# Patient Record
Sex: FEMALE | Race: WHITE | NOT HISPANIC OR LATINO | Employment: FULL TIME | URBAN - METROPOLITAN AREA
[De-identification: names, ages, dates, MRNs, and addresses within clinical notes are randomized per-mention and may not be internally consistent; named-entity substitution may affect disease eponyms.]

---

## 2023-09-03 PROBLEM — D22.39 MELANOCYTIC NEVI OF OTHER PARTS OF FACE: Status: ACTIVE | Noted: 2023-03-08

## 2023-09-03 PROBLEM — M25.511 CHRONIC RIGHT SHOULDER PAIN: Status: ACTIVE | Noted: 2023-09-03

## 2023-09-03 PROBLEM — S63.502A SPRAIN OF LEFT WRIST: Status: ACTIVE | Noted: 2023-09-03

## 2023-09-03 PROBLEM — D22.4 MELANOCYTIC NEVI OF SCALP AND NECK: Status: ACTIVE | Noted: 2023-03-08

## 2023-09-03 PROBLEM — R92.30 DENSE BREAST TISSUE ON MAMMOGRAM: Status: ACTIVE | Noted: 2023-09-03

## 2023-09-03 PROBLEM — M19.012 BILATERAL SHOULDER REGION ARTHRITIS: Status: ACTIVE | Noted: 2023-09-03

## 2023-09-03 PROBLEM — M18.12 LOCALIZED PRIMARY OSTEOARTHRITIS OF CARPOMETACARPAL JOINT OF LEFT THUMB: Status: ACTIVE | Noted: 2023-09-03

## 2023-09-03 PROBLEM — D22.5 MELANOCYTIC NEVI OF TRUNK: Status: ACTIVE | Noted: 2023-03-08

## 2023-09-03 PROBLEM — L81.4 OTHER MELANIN HYPERPIGMENTATION: Status: ACTIVE | Noted: 2023-03-08

## 2023-09-03 PROBLEM — M25.561 BILATERAL KNEE PAIN: Status: ACTIVE | Noted: 2023-09-03

## 2023-09-03 PROBLEM — L23.9 ALLERGIC CONTACT DERMATITIS, UNSPECIFIED CAUSE: Status: ACTIVE | Noted: 2023-03-08

## 2023-09-03 PROBLEM — L57.0 ACTINIC KERATOSIS: Status: ACTIVE | Noted: 2023-03-08

## 2023-09-03 PROBLEM — D22.70 MELANOCYTIC NEVI OF UNSPECIFIED LOWER LIMB, INCLUDING HIP: Status: ACTIVE | Noted: 2023-03-08

## 2023-09-03 PROBLEM — S60.512A ABRASION OF LEFT HAND: Status: ACTIVE | Noted: 2023-09-03

## 2023-09-03 PROBLEM — M75.100 ROTATOR CUFF TEAR: Status: ACTIVE | Noted: 2023-09-03

## 2023-09-03 PROBLEM — M17.10 PATELLOFEMORAL ARTHRITIS: Status: ACTIVE | Noted: 2023-09-03

## 2023-09-03 PROBLEM — M75.92 LEFT SUPRASPINATUS TENDINITIS: Status: ACTIVE | Noted: 2023-09-03

## 2023-09-03 PROBLEM — S50.02XA CONTUSION OF LEFT ELBOW: Status: ACTIVE | Noted: 2023-09-03

## 2023-09-03 PROBLEM — N83.202 CYST OF OVARY, LEFT: Status: ACTIVE | Noted: 2023-09-03

## 2023-09-03 PROBLEM — R92.8 ABNORMAL FINDING ON BREAST IMAGING: Status: ACTIVE | Noted: 2023-09-03

## 2023-09-03 PROBLEM — L30.9 DERMATITIS, UNSPECIFIED: Status: ACTIVE | Noted: 2023-03-08

## 2023-09-03 PROBLEM — N60.91 UNSPECIFIED BENIGN MAMMARY DYSPLASIA OF RIGHT BREAST: Status: ACTIVE | Noted: 2023-09-03

## 2023-09-03 PROBLEM — G89.29 CHRONIC RIGHT SHOULDER PAIN: Status: ACTIVE | Noted: 2023-09-03

## 2023-09-03 PROBLEM — L90.5 SCAR CONDITION AND FIBROSIS OF SKIN: Status: ACTIVE | Noted: 2023-03-08

## 2023-09-03 PROBLEM — D18.01 HEMANGIOMA OF SKIN AND SUBCUTANEOUS TISSUE: Status: ACTIVE | Noted: 2023-03-08

## 2023-09-03 PROBLEM — M25.562 BILATERAL KNEE PAIN: Status: ACTIVE | Noted: 2023-09-03

## 2023-09-03 PROBLEM — E78.01 FAMILIAL HYPERCHOLESTEROLEMIA: Status: ACTIVE | Noted: 2023-09-03

## 2023-09-03 PROBLEM — M75.22 BICIPITAL TENDINITIS, LEFT: Status: ACTIVE | Noted: 2023-09-03

## 2023-09-03 PROBLEM — M17.0 ARTHRITIS OF BOTH KNEES: Status: ACTIVE | Noted: 2023-09-03
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
## 2023-09-03 PROBLEM — M19.011 BILATERAL SHOULDER REGION ARTHRITIS: Status: ACTIVE | Noted: 2023-09-03

## 2023-09-03 PROBLEM — E03.9 HYPOTHYROIDISM: Status: ACTIVE | Noted: 2023-09-03

## 2023-09-03 PROBLEM — D48.5 NEOPLASM OF UNCERTAIN BEHAVIOR OF SKIN: Status: ACTIVE | Noted: 2023-03-08

## 2023-09-03 PROBLEM — S46.211A STRAIN OF RIGHT BICEPS: Status: ACTIVE | Noted: 2023-09-03

## 2023-09-03 PROBLEM — C44.629 SQUAMOUS CELL CARCINOMA OF SKIN OF LEFT UPPER LIMB, INCLUDING SHOULDER: Status: ACTIVE | Noted: 2023-03-08

## 2023-09-03 PROBLEM — N94.9 ADNEXAL FULLNESS: Status: ACTIVE | Noted: 2023-09-03

## 2023-09-03 PROBLEM — L30.1 DYSHIDROSIS (POMPHOLYX): Status: ACTIVE | Noted: 2023-03-08

## 2023-09-03 PROBLEM — N60.91 ATYPICAL LOBULAR HYPERPLASIA (ALH) OF RIGHT BREAST: Status: ACTIVE | Noted: 2023-09-03

## 2023-09-03 PROBLEM — K52.9 CHRONIC DIARRHEA: Status: ACTIVE | Noted: 2023-09-03

## 2023-09-03 PROBLEM — R29.898 WEAKNESS OF SHOULDER: Status: ACTIVE | Noted: 2023-09-03

## 2023-09-03 PROBLEM — E78.5 HYPERLIPIDEMIA: Status: ACTIVE | Noted: 2023-09-03

## 2023-09-03 PROBLEM — D22.60 MELANOCYTIC NEVI OF UNSPECIFIED UPPER LIMB, INCLUDING SHOULDER: Status: ACTIVE | Noted: 2023-03-08

## 2023-09-03 PROBLEM — Z85.828 PERSONAL HISTORY OF OTHER MALIGNANT NEOPLASM OF SKIN: Status: ACTIVE | Noted: 2023-03-08

## 2023-09-03 PROBLEM — M25.512 SHOULDER PAIN, LEFT: Status: ACTIVE | Noted: 2023-09-03

## 2023-09-03 PROBLEM — M53.3 COCCYDYNIA: Status: ACTIVE | Noted: 2023-09-03

## 2023-09-03 RX ORDER — PSEUDOEPHEDRINE HCL 120 MG/1
120 TABLET, FILM COATED, EXTENDED RELEASE ORAL EVERY 12 HOURS
COMMUNITY
End: 2023-09-12 | Stop reason: ALTCHOICE

## 2023-09-03 RX ORDER — DOXYCYCLINE 100 MG/1
100 CAPSULE ORAL
COMMUNITY
Start: 2021-02-17 | End: 2023-09-12 | Stop reason: ALTCHOICE

## 2023-09-03 RX ORDER — FLUOCINONIDE 0.5 MG/G
1 CREAM TOPICAL
COMMUNITY
Start: 2016-05-17 | End: 2024-04-11 | Stop reason: WASHOUT

## 2023-09-03 RX ORDER — MULTIVITAMIN
TABLET ORAL
COMMUNITY
Start: 2022-11-17

## 2023-09-03 RX ORDER — DICLOFENAC SODIUM 10 MG/G
1 GEL TOPICAL DAILY
COMMUNITY
Start: 2023-08-01 | End: 2023-10-25 | Stop reason: SDUPTHER

## 2023-09-03 RX ORDER — MUPIROCIN 20 MG/G
OINTMENT TOPICAL
COMMUNITY
Start: 2021-02-17 | End: 2023-09-12 | Stop reason: ALTCHOICE

## 2023-09-03 RX ORDER — CYCLOBENZAPRINE HCL 5 MG
1 TABLET ORAL NIGHTLY PRN
COMMUNITY
Start: 2022-09-09 | End: 2023-09-12

## 2023-09-03 RX ORDER — CLINDAMYCIN PHOSPHATE 10 UG/ML
1 LOTION TOPICAL
COMMUNITY
Start: 2019-03-06

## 2023-09-03 RX ORDER — ACETAMINOPHEN 500 MG
2 TABLET ORAL EVERY 6 HOURS PRN
COMMUNITY

## 2023-09-03 RX ORDER — MULTIVIT-MIN/IRON/FOLIC ACID/K 18-600-40
500 CAPSULE ORAL
COMMUNITY
Start: 2022-11-17

## 2023-09-03 RX ORDER — IBUPROFEN 200 MG
200 TABLET ORAL
COMMUNITY

## 2023-09-03 RX ORDER — LIDOCAINE 50 MG/G
PATCH TOPICAL
COMMUNITY
Start: 2022-02-03 | End: 2023-10-09

## 2023-09-03 RX ORDER — GUAIFENESIN 600 MG/1
600 TABLET, EXTENDED RELEASE ORAL EVERY 12 HOURS PRN
COMMUNITY
End: 2023-09-12 | Stop reason: ALTCHOICE

## 2023-09-03 RX ORDER — TRIAMCINOLONE ACETONIDE 1 MG/G
1 CREAM TOPICAL
COMMUNITY
Start: 2020-09-29 | End: 2024-04-11 | Stop reason: WASHOUT

## 2023-09-03 RX ORDER — CELECOXIB 200 MG/1
1 CAPSULE ORAL
COMMUNITY
Start: 2022-06-14 | End: 2023-09-12

## 2023-09-03 RX ORDER — LETROZOLE 2.5 MG/1
2.5 TABLET, FILM COATED ORAL DAILY
COMMUNITY
Start: 2023-04-13 | End: 2023-10-10 | Stop reason: ENTERED-IN-ERROR

## 2023-09-03 RX ORDER — ONDANSETRON 4 MG/1
1 TABLET, ORALLY DISINTEGRATING ORAL EVERY 6 HOURS PRN
COMMUNITY
Start: 2022-12-16

## 2023-09-12 ENCOUNTER — OFFICE VISIT (OUTPATIENT)
Dept: PRIMARY CARE | Facility: CLINIC | Age: 53
End: 2023-09-12
Payer: COMMERCIAL

## 2023-09-12 VITALS
BODY MASS INDEX: 21.79 KG/M2 | SYSTOLIC BLOOD PRESSURE: 126 MMHG | DIASTOLIC BLOOD PRESSURE: 80 MMHG | HEART RATE: 70 BPM | WEIGHT: 135 LBS

## 2023-09-12 DIAGNOSIS — C50.412 MALIGNANT NEOPLASM OF UPPER-OUTER QUADRANT OF LEFT BREAST IN FEMALE, ESTROGEN RECEPTOR POSITIVE (MULTI): ICD-10-CM

## 2023-09-12 DIAGNOSIS — M62.838 CERVICAL PARASPINOUS MUSCLE SPASM: ICD-10-CM

## 2023-09-12 DIAGNOSIS — S40.022A CONTUSION OF LEFT UPPER ARM, INITIAL ENCOUNTER: ICD-10-CM

## 2023-09-12 DIAGNOSIS — Z17.0 MALIGNANT NEOPLASM OF UPPER-OUTER QUADRANT OF LEFT BREAST IN FEMALE, ESTROGEN RECEPTOR POSITIVE (MULTI): ICD-10-CM

## 2023-09-12 DIAGNOSIS — F51.04 PSYCHOPHYSIOLOGICAL INSOMNIA: ICD-10-CM

## 2023-09-12 DIAGNOSIS — F43.10 PTSD (POST-TRAUMATIC STRESS DISORDER): Primary | ICD-10-CM

## 2023-09-12 DIAGNOSIS — S80.812A ABRASION OF ANTERIOR LEFT LOWER LEG, INITIAL ENCOUNTER: ICD-10-CM

## 2023-09-12 DIAGNOSIS — F51.5 NIGHTMARES: ICD-10-CM

## 2023-09-12 DIAGNOSIS — M54.81 BILATERAL OCCIPITAL NEURALGIA: ICD-10-CM

## 2023-09-12 PROBLEM — M77.8 RIGHT SHOULDER TENDONITIS: Status: ACTIVE | Noted: 2018-11-08

## 2023-09-12 PROBLEM — R92.30 DENSE BREAST TISSUE ON MAMMOGRAM: Status: RESOLVED | Noted: 2023-09-03 | Resolved: 2023-09-12

## 2023-09-12 PROBLEM — M75.111 NONTRAUMATIC PARTIAL BILATERAL ROTATOR CUFF TEAR: Status: ACTIVE | Noted: 2019-01-15

## 2023-09-12 PROBLEM — D48.5 NEOPLASM OF UNCERTAIN BEHAVIOR OF SKIN: Status: RESOLVED | Noted: 2023-03-08 | Resolved: 2023-09-12

## 2023-09-12 PROBLEM — R92.8 ABNORMAL FINDING ON BREAST IMAGING: Status: RESOLVED | Noted: 2023-09-03 | Resolved: 2023-09-12

## 2023-09-12 PROBLEM — K52.9 CHRONIC DIARRHEA: Status: RESOLVED | Noted: 2023-09-03 | Resolved: 2023-09-12

## 2023-09-12 PROBLEM — S43.431A LABRAL TEAR OF SHOULDER, RIGHT, INITIAL ENCOUNTER: Status: ACTIVE | Noted: 2019-01-15

## 2023-09-12 PROBLEM — M75.112 NONTRAUMATIC PARTIAL BILATERAL ROTATOR CUFF TEAR: Status: ACTIVE | Noted: 2019-01-15

## 2023-09-12 PROBLEM — N60.91 UNSPECIFIED BENIGN MAMMARY DYSPLASIA OF RIGHT BREAST: Status: RESOLVED | Noted: 2023-09-03 | Resolved: 2023-09-12

## 2023-09-12 PROBLEM — Z85.828 PERSONAL HISTORY OF OTHER MALIGNANT NEOPLASM OF SKIN: Status: RESOLVED | Noted: 2023-03-08 | Resolved: 2023-09-12

## 2023-09-12 PROBLEM — E78.01 FAMILIAL HYPERCHOLESTEROLEMIA: Status: RESOLVED | Noted: 2023-09-03 | Resolved: 2023-09-12

## 2023-09-12 PROBLEM — S50.02XA CONTUSION OF LEFT ELBOW: Status: RESOLVED | Noted: 2023-09-03 | Resolved: 2023-09-12

## 2023-09-12 PROCEDURE — 99214 OFFICE O/P EST MOD 30 MIN: CPT | Performed by: FAMILY MEDICINE

## 2023-09-12 PROCEDURE — 1036F TOBACCO NON-USER: CPT | Performed by: FAMILY MEDICINE

## 2023-09-12 RX ORDER — METHYLPREDNISOLONE 4 MG/1
TABLET ORAL
Qty: 21 TABLET | Refills: 0 | Status: SHIPPED | OUTPATIENT
Start: 2023-09-12 | End: 2023-10-31 | Stop reason: ALTCHOICE

## 2023-09-12 RX ORDER — LORAZEPAM 0.5 MG/1
0.5 TABLET ORAL EVERY 8 HOURS PRN
Qty: 21 TABLET | Refills: 0 | Status: SHIPPED | OUTPATIENT
Start: 2023-09-12 | End: 2024-04-11 | Stop reason: WASHOUT

## 2023-09-12 ASSESSMENT — PATIENT HEALTH QUESTIONNAIRE - PHQ9
2. FEELING DOWN, DEPRESSED OR HOPELESS: NOT AT ALL
SUM OF ALL RESPONSES TO PHQ9 QUESTIONS 1 AND 2: 0
1. LITTLE INTEREST OR PLEASURE IN DOING THINGS: NOT AT ALL

## 2023-09-12 NOTE — ASSESSMENT & PLAN NOTE
I suspect that the Medrol Dosepak should help with this area, but if it becomes resistant to therapy, cortisone injections can be provided in the area of concern

## 2023-09-12 NOTE — ASSESSMENT & PLAN NOTE
Since you have failed OTC remedies that were noted in the HPI, we discussed the use of Ativan on a short-term an interim basis to help with your sleep cycle, hopefully decrease your racing thoughts, and allow you to sleep for a few nights

## 2023-09-12 NOTE — ASSESSMENT & PLAN NOTE
I suggested a comprehensive team approach that includes physical therapy to further evaluate and treat  Rx Medrol Dosepak was provided to help with inflammation and subsequent pain  A muscle relaxant medication was provided via the ED, which may continue to be used if necessary  Appropriate office visits in follow-up as necessary  Return to the clinic either during or after physical therapy if the pain does not improve  We will have to consider further evaluation that may include MRI imaging    I would like to see you at the end of your physical therapy sessions if your pain has resolved  We can then ensure proper medical clearance and provide resolution to your case
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
## 2023-09-12 NOTE — ASSESSMENT & PLAN NOTE
Thoughts and prayers are with you as you continue to fight the odonnell of beating cancer  If there is anything that I can do, please do not hesitate to communicate with me  Please continue to follow with surgical oncology team as necessary

## 2023-09-22 DIAGNOSIS — M62.838 CERVICAL PARASPINOUS MUSCLE SPASM: Primary | ICD-10-CM

## 2023-09-22 RX ORDER — CYCLOBENZAPRINE HCL 10 MG
10 TABLET ORAL 2 TIMES DAILY PRN
Qty: 60 TABLET | Refills: 0 | Status: SHIPPED | OUTPATIENT
Start: 2023-09-22 | End: 2024-04-11 | Stop reason: WASHOUT

## 2023-09-26 ENCOUNTER — OFFICE VISIT (OUTPATIENT)
Dept: PRIMARY CARE | Facility: CLINIC | Age: 53
End: 2023-09-26
Payer: COMMERCIAL

## 2023-09-26 ENCOUNTER — LAB (OUTPATIENT)
Dept: LAB | Facility: LAB | Age: 53
End: 2023-09-26
Payer: COMMERCIAL

## 2023-09-26 VITALS
DIASTOLIC BLOOD PRESSURE: 72 MMHG | WEIGHT: 136 LBS | SYSTOLIC BLOOD PRESSURE: 120 MMHG | BODY MASS INDEX: 21.95 KG/M2 | HEART RATE: 70 BPM

## 2023-09-26 DIAGNOSIS — T45.1X5A HOT FLASHES RELATED TO AROMATASE INHIBITOR THERAPY: ICD-10-CM

## 2023-09-26 DIAGNOSIS — M62.838 MUSCLE SPASMS OF NECK: ICD-10-CM

## 2023-09-26 DIAGNOSIS — Z00.00 ANNUAL PHYSICAL EXAM: Primary | ICD-10-CM

## 2023-09-26 DIAGNOSIS — M54.81 BILATERAL OCCIPITAL NEURALGIA: ICD-10-CM

## 2023-09-26 DIAGNOSIS — Z00.00 ANNUAL PHYSICAL EXAM: ICD-10-CM

## 2023-09-26 DIAGNOSIS — M99.01 SOMATIC DYSFUNCTION OF SPINE, CERVICAL: ICD-10-CM

## 2023-09-26 DIAGNOSIS — R23.2 HOT FLASHES RELATED TO AROMATASE INHIBITOR THERAPY: ICD-10-CM

## 2023-09-26 PROBLEM — R51.9 HEADACHE: Status: ACTIVE | Noted: 2023-09-26

## 2023-09-26 LAB
ALANINE AMINOTRANSFERASE (SGPT) (U/L) IN SER/PLAS: 10 U/L (ref 7–45)
ALBUMIN (G/DL) IN SER/PLAS: 4.1 G/DL (ref 3.4–5)
ALKALINE PHOSPHATASE (U/L) IN SER/PLAS: 55 U/L (ref 33–110)
ANION GAP IN SER/PLAS: 11 MMOL/L (ref 10–20)
ASPARTATE AMINOTRANSFERASE (SGOT) (U/L) IN SER/PLAS: 19 U/L (ref 9–39)
BASOPHILS (10*3/UL) IN BLOOD BY AUTOMATED COUNT: 0.02 X10E9/L (ref 0–0.1)
BASOPHILS/100 LEUKOCYTES IN BLOOD BY AUTOMATED COUNT: 0.5 % (ref 0–2)
BILIRUBIN TOTAL (MG/DL) IN SER/PLAS: 0.5 MG/DL (ref 0–1.2)
CALCIUM (MG/DL) IN SER/PLAS: 9 MG/DL (ref 8.6–10.3)
CARBON DIOXIDE, TOTAL (MMOL/L) IN SER/PLAS: 26 MMOL/L (ref 21–32)
CHLORIDE (MMOL/L) IN SER/PLAS: 107 MMOL/L (ref 98–107)
CHOLESTEROL (MG/DL) IN SER/PLAS: 325 MG/DL (ref 0–199)
CHOLESTEROL IN HDL (MG/DL) IN SER/PLAS: 73.6 MG/DL
CHOLESTEROL/HDL RATIO: 4.4
CREATININE (MG/DL) IN SER/PLAS: 0.81 MG/DL (ref 0.5–1.05)
EOSINOPHILS (10*3/UL) IN BLOOD BY AUTOMATED COUNT: 0.03 X10E9/L (ref 0–0.7)
EOSINOPHILS/100 LEUKOCYTES IN BLOOD BY AUTOMATED COUNT: 0.8 % (ref 0–6)
ERYTHROCYTE DISTRIBUTION WIDTH (RATIO) BY AUTOMATED COUNT: 13.2 % (ref 11.5–14.5)
ERYTHROCYTE MEAN CORPUSCULAR HEMOGLOBIN CONCENTRATION (G/DL) BY AUTOMATED: 32.3 G/DL (ref 32–36)
ERYTHROCYTE MEAN CORPUSCULAR VOLUME (FL) BY AUTOMATED COUNT: 91 FL (ref 80–100)
ERYTHROCYTES (10*6/UL) IN BLOOD BY AUTOMATED COUNT: 4.18 X10E12/L (ref 4–5.2)
GFR FEMALE: 85 ML/MIN/1.73M2
GLUCOSE (MG/DL) IN SER/PLAS: 82 MG/DL (ref 74–99)
HEMATOCRIT (%) IN BLOOD BY AUTOMATED COUNT: 38.1 % (ref 36–46)
HEMOGLOBIN (G/DL) IN BLOOD: 12.3 G/DL (ref 12–16)
IMMATURE GRANULOCYTES/100 LEUKOCYTES IN BLOOD BY AUTOMATED COUNT: 0.5 % (ref 0–0.9)
LDL: 227 MG/DL (ref 0–99)
LEUKOCYTES (10*3/UL) IN BLOOD BY AUTOMATED COUNT: 3.9 X10E9/L (ref 4.4–11.3)
LYMPHOCYTES (10*3/UL) IN BLOOD BY AUTOMATED COUNT: 1.01 X10E9/L (ref 1.2–4.8)
LYMPHOCYTES/100 LEUKOCYTES IN BLOOD BY AUTOMATED COUNT: 25.8 % (ref 13–44)
MONOCYTES (10*3/UL) IN BLOOD BY AUTOMATED COUNT: 0.4 X10E9/L (ref 0.1–1)
MONOCYTES/100 LEUKOCYTES IN BLOOD BY AUTOMATED COUNT: 10.2 % (ref 2–10)
NEUTROPHILS (10*3/UL) IN BLOOD BY AUTOMATED COUNT: 2.44 X10E9/L (ref 1.2–7.7)
NEUTROPHILS/100 LEUKOCYTES IN BLOOD BY AUTOMATED COUNT: 62.2 % (ref 40–80)
PLATELETS (10*3/UL) IN BLOOD AUTOMATED COUNT: 204 X10E9/L (ref 150–450)
POC APPEARANCE, URINE: CLEAR
POC BILIRUBIN, URINE: NEGATIVE
POC BLOOD, URINE: NEGATIVE
POC COLOR, URINE: YELLOW
POC GLUCOSE, URINE: NEGATIVE MG/DL
POC KETONES, URINE: NEGATIVE MG/DL
POC LEUKOCYTES, URINE: NEGATIVE
POC NITRITE,URINE: NEGATIVE
POC PH, URINE: 6 PH
POC PROTEIN, URINE: NEGATIVE MG/DL
POC SPECIFIC GRAVITY, URINE: 1.01
POC UROBILINOGEN, URINE: 0.2 EU/DL
POTASSIUM (MMOL/L) IN SER/PLAS: 4.7 MMOL/L (ref 3.5–5.3)
PROTEIN TOTAL: 6.3 G/DL (ref 6.4–8.2)
SODIUM (MMOL/L) IN SER/PLAS: 139 MMOL/L (ref 136–145)
THYROTROPIN (MIU/L) IN SER/PLAS BY DETECTION LIMIT <= 0.05 MIU/L: 4.34 MIU/L (ref 0.44–3.98)
THYROXINE (T4) FREE (NG/DL) IN SER/PLAS: 0.76 NG/DL (ref 0.61–1.12)
TRIGLYCERIDE (MG/DL) IN SER/PLAS: 123 MG/DL (ref 0–149)
UREA NITROGEN (MG/DL) IN SER/PLAS: 18 MG/DL (ref 6–23)
VLDL: 25 MG/DL (ref 0–40)

## 2023-09-26 PROCEDURE — 84443 ASSAY THYROID STIM HORMONE: CPT

## 2023-09-26 PROCEDURE — 99213 OFFICE O/P EST LOW 20 MIN: CPT | Performed by: FAMILY MEDICINE

## 2023-09-26 PROCEDURE — 84439 ASSAY OF FREE THYROXINE: CPT

## 2023-09-26 PROCEDURE — 85025 COMPLETE CBC W/AUTO DIFF WBC: CPT

## 2023-09-26 PROCEDURE — 93000 ELECTROCARDIOGRAM COMPLETE: CPT | Performed by: FAMILY MEDICINE

## 2023-09-26 PROCEDURE — 98925 OSTEOPATH MANJ 1-2 REGIONS: CPT | Performed by: FAMILY MEDICINE

## 2023-09-26 PROCEDURE — 36415 COLL VENOUS BLD VENIPUNCTURE: CPT

## 2023-09-26 PROCEDURE — 99396 PREV VISIT EST AGE 40-64: CPT | Performed by: FAMILY MEDICINE

## 2023-09-26 PROCEDURE — 80053 COMPREHEN METABOLIC PANEL: CPT

## 2023-09-26 PROCEDURE — 80061 LIPID PANEL: CPT

## 2023-09-26 PROCEDURE — 81002 URINALYSIS NONAUTO W/O SCOPE: CPT | Performed by: FAMILY MEDICINE

## 2023-09-26 PROCEDURE — 1036F TOBACCO NON-USER: CPT | Performed by: FAMILY MEDICINE

## 2023-09-26 ASSESSMENT — PATIENT HEALTH QUESTIONNAIRE - PHQ9
1. LITTLE INTEREST OR PLEASURE IN DOING THINGS: NOT AT ALL
SUM OF ALL RESPONSES TO PHQ9 QUESTIONS 1 AND 2: 0
2. FEELING DOWN, DEPRESSED OR HOPELESS: NOT AT ALL

## 2023-09-26 NOTE — ASSESSMENT & PLAN NOTE
Follows with oncology and GYN, Next oncology appointment scheduled for October, Follow up mammogram scheduled for November 3rd, on oral letrozole

## 2023-09-26 NOTE — ASSESSMENT & PLAN NOTE
Osteopathic manipulation therapy was performed today using soft tissue and high velocity, low amplitude techniques to the affected region as noted on physical examination. The procedure was tolerated well, successful, and without complications.    Osteopathic manipulation therapy was performed today in order to improve blood flow, relax muscles, and free your joints that are not moving properly in an attempt to restore normal functioning.    This approach is not about getting rid of pain, but instead trying to reduce pain and discomfort while improving muscular endurance.     Osteopathic manipulation therapy helps us to identify where there are muscular joint and soft tissue imbalances and using osteopathic manipulative treatments can aid in helping to put those areas back into better movement or alignment. The consequence of these dysfunctional movements of the body results in some group of muscles being overworked leading to fatigue and discomfort and pain while other muscles are being underworked.     This will also impact joints and joint tissues that have nerve receptors which can also contribute to the pain and discomfort. Since the body wants to be moving at its most efficient manner, osteopathic manipulation, by restoring these dysfunctional movements of the musculoskeletal system into better alignment, can finally allow the body to actually utilize the underworked muscles more and decrease overworking of the overused muscles which results in less fatigue of the muscles and less stress at the joint level.

## 2023-09-26 NOTE — ASSESSMENT & PLAN NOTE
Bilateral shoulder pain, does wake from sleep due to this, scheduling bilateral shoulder injections soon; if not she will take Medrol dose pack prescribed

## 2023-09-26 NOTE — ASSESSMENT & PLAN NOTE
Unfortunately, this is a very common side effect of the medication  There may be some medication that we could be able to use for you if this becomes problematic such as an SSRI if you choose to go that route
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
## 2023-09-26 NOTE — ASSESSMENT & PLAN NOTE
Fasting bloodwork ordered for today, not currently on pharmacotherapy for this, normal physical exam

## 2023-09-26 NOTE — ASSESSMENT & PLAN NOTE
Currently stable, sleep is managed with Ativan at night, patient is no longer waking up with nightmares, continue to monitor

## 2023-09-26 NOTE — ASSESSMENT & PLAN NOTE
Osteopathic manipulation therapy was performed today using soft tissue and high velocity, low amplitude techniques to the affected region as noted on physical examination. The procedure was tolerated well, successful, and without complications.

## 2023-09-26 NOTE — ASSESSMENT & PLAN NOTE
Complete history and physical examination was performed  EKG reveals sinus rhythm without acute changes  We will notify of test results once available

## 2023-09-27 NOTE — RESULT ENCOUNTER NOTE
Your cholesterol levels are as follows: 325 total, 73 HDL, 227 LDL, and 123 triglycerides. Previously, they were 297 total, 76 HDL, 201 LDL, and 101 triglycerides. This represents one of the higher LDL levels I've encountered in my practice. However, it's worth noting that your CT cardiac score was 0 back in 2013. If you haven't already, it might be a good idea to have a conversation with a cardiologist. Generally, when LDL levels exceed 180, statin therapy is recommended, regardless of a CT cardiac score of 0.    Your sugar levels, kidney function, liver function, and electrolytes are all within the normal range.    Your TSH (Thyroid-Stimulating Hormone) is slightly elevated at 4.34, compared to 4.46 previously, but your free T4 level is normal. I don't believe this indicates true hypothyroidism at this point, but it could potentially progress to that stage, requiring lifelong medication. Since your T4 is normal, there's no need for treatment at this time.    Your white blood cell count (WBC) is a bit low at 3.9, compared to 3.1 previously, and it has been as low as 3.0 in the past. This appears to be consistent with your normal baseline.    If you have any further questions or concerns, please don't hesitate to reach out. [phone]

## 2023-09-28 DIAGNOSIS — E78.2 MIXED HYPERLIPIDEMIA: Primary | ICD-10-CM

## 2023-10-06 ENCOUNTER — CLINICAL SUPPORT (OUTPATIENT)
Dept: ORTHOPEDIC SURGERY | Facility: HOSPITAL | Age: 53
End: 2023-10-06
Payer: COMMERCIAL

## 2023-10-06 DIAGNOSIS — M77.8 RIGHT SHOULDER TENDONITIS: ICD-10-CM

## 2023-10-06 DIAGNOSIS — G89.29 CHRONIC LEFT SHOULDER PAIN: Primary | ICD-10-CM

## 2023-10-06 DIAGNOSIS — M25.512 LEFT SHOULDER PAIN, UNSPECIFIED CHRONICITY: Primary | ICD-10-CM

## 2023-10-06 DIAGNOSIS — M25.512 CHRONIC LEFT SHOULDER PAIN: Primary | ICD-10-CM

## 2023-10-06 PROCEDURE — 20610 DRAIN/INJ JOINT/BURSA W/O US: CPT | Mod: RT | Performed by: NURSE PRACTITIONER

## 2023-10-06 PROCEDURE — 99213 OFFICE O/P EST LOW 20 MIN: CPT | Mod: 25 | Performed by: NURSE PRACTITIONER

## 2023-10-06 PROCEDURE — 2500000004 HC RX 250 GENERAL PHARMACY W/ HCPCS (ALT 636 FOR OP/ED): Performed by: NURSE PRACTITIONER

## 2023-10-06 PROCEDURE — 2500000005 HC RX 250 GENERAL PHARMACY W/O HCPCS: Performed by: NURSE PRACTITIONER

## 2023-10-06 PROCEDURE — 99213 OFFICE O/P EST LOW 20 MIN: CPT | Performed by: NURSE PRACTITIONER

## 2023-10-06 RX ADMIN — TRIAMCINOLONE ACETONIDE 1 MG: 200 INJECTION, SUSPENSION INTRA-ARTICULAR; INTRAMUSCULAR at 23:31

## 2023-10-06 RX ADMIN — LIDOCAINE HYDROCHLORIDE 4 ML: 10 INJECTION, SOLUTION INFILTRATION; PERINEURAL at 23:31
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
## 2023-10-09 RX ORDER — LIDOCAINE 50 MG/G
PATCH TOPICAL
Qty: 30 PATCH | Refills: 0 | Status: SHIPPED | OUTPATIENT
Start: 2023-10-09 | End: 2023-11-08

## 2023-10-10 ENCOUNTER — TELEPHONE VISIT (OUTPATIENT)
Dept: HEMATOLOGY/ONCOLOGY | Facility: HOSPITAL | Age: 53
End: 2023-10-10
Payer: COMMERCIAL

## 2023-10-10 DIAGNOSIS — Z17.0 MALIGNANT NEOPLASM OF BREAST IN FEMALE, ESTROGEN RECEPTOR POSITIVE, UNSPECIFIED LATERALITY, UNSPECIFIED SITE OF BREAST (MULTI): Primary | ICD-10-CM

## 2023-10-10 DIAGNOSIS — Z17.0 MALIGNANT NEOPLASM OF UPPER-OUTER QUADRANT OF LEFT BREAST IN FEMALE, ESTROGEN RECEPTOR POSITIVE (MULTI): Primary | ICD-10-CM

## 2023-10-10 DIAGNOSIS — C50.919 MALIGNANT NEOPLASM OF BREAST IN FEMALE, ESTROGEN RECEPTOR POSITIVE, UNSPECIFIED LATERALITY, UNSPECIFIED SITE OF BREAST (MULTI): Primary | ICD-10-CM

## 2023-10-10 DIAGNOSIS — C50.412 MALIGNANT NEOPLASM OF UPPER-OUTER QUADRANT OF LEFT BREAST IN FEMALE, ESTROGEN RECEPTOR POSITIVE (MULTI): Primary | ICD-10-CM

## 2023-10-10 PROCEDURE — 99213 OFFICE O/P EST LOW 20 MIN: CPT | Performed by: STUDENT IN AN ORGANIZED HEALTH CARE EDUCATION/TRAINING PROGRAM

## 2023-10-10 RX ORDER — LETROZOLE 2.5 MG/1
2.5 TABLET, FILM COATED ORAL DAILY
Qty: 30 TABLET | Refills: 0 | OUTPATIENT
Start: 2023-10-10 | End: 2023-11-09

## 2023-10-10 NOTE — TELEPHONE ENCOUNTER
Patient requesting refill for Letrozole PO 2.5mg PO every day, according to the chart in the EMR RX sent already to Dr. Olivera as a request to refill.
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
## 2023-10-16 ENCOUNTER — LAB (OUTPATIENT)
Dept: LAB | Facility: LAB | Age: 53
End: 2023-10-16
Payer: COMMERCIAL

## 2023-10-16 DIAGNOSIS — Z17.0 MALIGNANT NEOPLASM OF UPPER-OUTER QUADRANT OF LEFT BREAST IN FEMALE, ESTROGEN RECEPTOR POSITIVE (MULTI): ICD-10-CM

## 2023-10-16 DIAGNOSIS — C50.412 MALIGNANT NEOPLASM OF UPPER-OUTER QUADRANT OF LEFT BREAST IN FEMALE, ESTROGEN RECEPTOR POSITIVE (MULTI): ICD-10-CM

## 2023-10-16 LAB
ESTRADIOL SERPL-MCNC: <19 PG/ML
FSH SERPL-ACNC: 52.8 IU/L
LH SERPL-ACNC: 29.5 IU/L

## 2023-10-16 PROCEDURE — 83002 ASSAY OF GONADOTROPIN (LH): CPT

## 2023-10-16 PROCEDURE — 83001 ASSAY OF GONADOTROPIN (FSH): CPT

## 2023-10-16 PROCEDURE — 82670 ASSAY OF TOTAL ESTRADIOL: CPT

## 2023-10-17 RX ORDER — TRIAMCINOLONE ACETONIDE 40 MG/ML
1 INJECTION, SUSPENSION INTRA-ARTICULAR; INTRAMUSCULAR
Status: COMPLETED | OUTPATIENT
Start: 2023-10-06 | End: 2023-10-06

## 2023-10-17 RX ORDER — LIDOCAINE HYDROCHLORIDE 10 MG/ML
4 INJECTION INFILTRATION; PERINEURAL
Status: COMPLETED | OUTPATIENT
Start: 2023-10-06 | End: 2023-10-06

## 2023-10-20 ENCOUNTER — HOSPITAL ENCOUNTER (OUTPATIENT)
Dept: RADIATION ONCOLOGY | Facility: CLINIC | Age: 53
Setting detail: RADIATION/ONCOLOGY SERIES
Discharge: STILL ADMITTED OR ADMINISTRATIVE | End: 2023-10-20
Payer: COMMERCIAL

## 2023-10-20 VITALS
WEIGHT: 137.5 LBS | HEART RATE: 58 BPM | TEMPERATURE: 97.3 F | DIASTOLIC BLOOD PRESSURE: 77 MMHG | OXYGEN SATURATION: 99 % | BODY MASS INDEX: 22.19 KG/M2 | SYSTOLIC BLOOD PRESSURE: 130 MMHG

## 2023-10-20 DIAGNOSIS — C50.412 MALIGNANT NEOPLASM OF UPPER-OUTER QUADRANT OF LEFT BREAST IN FEMALE, ESTROGEN RECEPTOR POSITIVE (MULTI): Primary | ICD-10-CM

## 2023-10-20 DIAGNOSIS — Z17.0 MALIGNANT NEOPLASM OF UPPER-OUTER QUADRANT OF LEFT BREAST IN FEMALE, ESTROGEN RECEPTOR POSITIVE (MULTI): Primary | ICD-10-CM

## 2023-10-20 PROCEDURE — 99214 OFFICE O/P EST MOD 30 MIN: CPT | Performed by: NURSE PRACTITIONER

## 2023-10-20 ASSESSMENT — PAIN SCALES - GENERAL: PAINLEVEL: 0-NO PAIN

## 2023-10-20 NOTE — PROGRESS NOTES
Radiation Oncology Follow-Up    Patient Name:  Ruba Woodard  MRN:  [id number]  :  1970    Referring Provider: No ref. provider found  Primary Care Provider: Gregg Gonzalez DO  Care Team: Patient Care Team:  Gregg Gonzalez DO as PCP - General (Family Medicine)  Gomez Olivera MD as Consulting Physician (Hematology and Oncology)    Date of Service: 10/20/2023          AJCC Edition: 8th (AJCC), Diagnosis Date: Dec 2022, IA, pT1b pN0 cM0 G1     Treatment Synopsis:    53 year female with left breast invasive carcinoma  IDC grade 2, ER 95%, [place] 70% her2 2+ but negative by dual RE  left partial mastectomy with SLNB  (0/2) c/w grade I IDC, with DCIS grade2 , low risk mammaprint low risk luminal A +0.505, stage as [id number].     Medical oncology: Dr. Gomez Olivera  Radiation oncology: Dr. Gaye Egale  Surgical oncology:  Dr. Nely Chavez     Treatment Summary:      On 10/13/2022: had a digital mammogram, screening that showed heterogenously dense breast tissue which may obscure small masses, left breat asymetry with right breast calcifications.  BIRADS category 0     On 10/28/2022: she had diagnostic mammogram that showed heterogenously dense breast, previously noted calcifications are grouped round, punctuate and indistinct calcifications in the inferolateral right breast  at anterior depth. within the left breast,  the previously noted asymmetry persists as an irregular focal asymmetry in the superolateral aspect of the posterior depth . On ultrasound at 1o'clock, 8 CFN  0.7x0.8x0.5cm mass irregular , left axilla US showed 3 unremarkable LN      On 2022: right breast calcification core needle biopsy  showed columnar cell change and hyperplasia with associated focal microcalcifications, left breast core needle biopsy at 1oclock, 10 CFN showed IDC grade 2, ER 95%, [place] 70% her2 2+ but negative  by dual RE .with associated microcalcifcation with DICS, low nuclear grade, cribriform pattern with necrosis  and  calcifications. biopsy again of her right breast calcifications that was negative     On 12/14/2022 underwent left partial mastectomy with SLNB  (0/2) c/w grade I IDC, with DCIS grade2 , low risk mammaprint low risk luminal A +0.505, stage as [id number]     2/8/23 - 2/28/23: Radiation to the left breast consisting of a dose of 40.05 Gy given in 15 fractions of 2.67 Gy per fraction. ABC was utilized for cardiac sparing.      Adjuvant endocrine therapy with aromatase inhibitor       SUBJECTIVE  History of Present Illness:   Ruba Woodard is here today for routine radiation follow up/surveillance visit.  She is doing very well and reports left breast well healed post treatment. She had minimal skin changes and skin is intact. No swelling of left arm or difficulty with ROM.  She continues letrozole and says the only adverse effect is elevation in her lipids. Denies headaches, fever, chills, cough, SOB, chest  pain, GI or  complaints, or bony pain. She does have arthritis in her knees and shoulders.      Review of Systems:    Review of Systems   All other systems reviewed and are negative.      Performance Status:   The Karnofsky performance scale today is 100, Fully active, able to carry on all pre-disease performed without restriction (ECOG equivalent 0).        OBJECTIVE  Vital Signs:  There were no vitals taken for this visit.     Current Outpatient Medications:     acetaminophen (Tylenol) 500 mg tablet, Take 2 tablets (1,000 mg) by mouth every 6 hours if needed., Disp: , Rfl:     ascorbic acid, vitamin C, 500 mg capsule, Take 500 mg by mouth., Disp: , Rfl:     clindamycin (Cleocin T) 1 % lotion, Apply 1 Application topically., Disp: , Rfl:     cyclobenzaprine (Flexeril) 10 mg tablet, Take 1 tablet (10 mg) by mouth 2 times a day as needed for muscle spasms., Disp: 60 tablet, Rfl: 0    diclofenac sodium (Voltaren) 1 % gel gel, Apply 1 Application topically once daily. Apply sparingly to affected areas once daily, Disp:  , Rfl:     fluocinonide 0.05 % cream, Apply 1 Application topically., Disp: , Rfl:     ibuprofen 200 mg tablet, Take 1 tablet (200 mg) by mouth., Disp: , Rfl:     letrozole (Femara) 2.5 mg tablet, Take 1 tablet (2.5 mg total) by mouth once daily.  Take with or without food., Disp: 360 tablet, Rfl: 0    lidocaine (Lidoderm) 5 % patch, USE AS DIRECTED ON  PACKAGE, Disp: 30 patch, Rfl: 0    LORazepam (Ativan) 0.5 mg tablet, Take 1 tablet (0.5 mg) by mouth every 8 hours if needed for anxiety for up to 7 days., Disp: 21 tablet, Rfl: 0    methylPREDNISolone (Medrol, Barry,) 4 mg tablets, Follow schedule on package instructions, Disp: 21 tablet, Rfl: 0    multivitamin (Multiple Vitamins) tablet, Take by mouth., Disp: , Rfl:     ondansetron ODT (Zofran-ODT) 4 mg disintegrating tablet, Take 1 tablet (4 mg) by mouth every 6 hours if needed for nausea or vomiting., Disp: , Rfl:     triamcinolone (Kenalog) 0.1 % cream, Apply 1 Application topically., Disp: , Rfl:    Physical Exam:  Physical Exam  Constitutional:       Appearance: Normal appearance.   HENT:      Head: Normocephalic and atraumatic.      Nose: Nose normal.      Mouth/Throat:      Pharynx: Oropharynx is clear.   Eyes:      Extraocular Movements: Extraocular movements intact.      Pupils: Pupils are equal, round, and reactive to light.   Cardiovascular:      Pulses: Normal pulses.      Heart sounds: Normal heart sounds. No murmur heard.     No gallop.   Pulmonary:      Effort: Pulmonary effort is normal.      Breath sounds: Normal breath sounds.   Chest:   Breasts:     Right: Normal. No swelling, inverted nipple, mass or nipple discharge.      Left: Normal. No swelling, inverted nipple, mass or nipple discharge.      Comments: Left breast with well healed surgical incision.   Abdominal:      Palpations: Abdomen is soft.   Musculoskeletal:         General: No swelling. Normal range of motion.      Cervical back: Normal range of motion and neck supple.    Lymphadenopathy:      Cervical: No cervical adenopathy.   Skin:     General: Skin is warm and dry.   Neurological:      General: No focal deficit present.      Mental Status: She is alert and oriented to person, place, and time.   Psychiatric:         Mood and Affect: Mood normal.         Behavior: Behavior normal.           RESULTS:  Narrative & Impression   Interpreted By:  PEDRO BALDWIN MD and COLT PHILLIPS MD  MRN: [id number]  Patient Name: MAGNO HI     STUDY:  DIGITAL DIAG MAMM LEFT W/CHINEDU UNILAT;  6/16/2023 8:54 am     ACCESSION NUMBER(S):  [id number]     ORDERING CLINICIAN:  ANABELLA TOSCANO     INDICATION:  Left breast cancer status post lumpectomy and axillary lymph node  dissection with radiation therapy in December 2022.     COMPARISON:  11/21/2022, 10/13/2022, 10/12/2021, and 10/05/2020.     FINDINGS:  2D and tomosynthesis images were reviewed at 1 mm slice thickness.     The breast tissue is heterogeneously dense, which may obscure small  masses.  Interval lumpectomy changes consisting of architectural  distortion, surgical clips and overlying skin retraction, of the  superolateral left breast at posterior depth. There are also  partially visualized postsurgical changes of the axilla. Mild skin  thickening and trabeculation is noted, consistent with post radiation  changes. No suspicious masses or calcifications are identified in the  left breast.     IMPRESSION:  No mammographic evidence of malignancy in the left breast. Post  treatment changes of the left breast and axilla. The patient is due  for bilateral annual screening mammography in November 2023.     BI-RADS CATEGORY:     Category: 2 - Benign.  Recommendation: Continued annual screening mammography, next due  November 2023.     ASSESSMENT/PLAN:  53 y.o. female with early stage left breast cancer s/p breast conserving surgery followed by radiation.  Doing well, and cosmesis is excellent.   She will follow up routinely with  med onc and will  schedule her mammogram with Dr. Chavez.  Radiation follow up in 6 mo.  Call  us with any questions or concerns.     Nandini Weinstein CNP  [phone]

## 2023-10-25 DIAGNOSIS — M25.511 CHRONIC PAIN OF BOTH SHOULDERS: Primary | ICD-10-CM

## 2023-10-25 DIAGNOSIS — M25.512 CHRONIC PAIN OF BOTH SHOULDERS: Primary | ICD-10-CM

## 2023-10-25 DIAGNOSIS — G89.29 CHRONIC PAIN OF BOTH SHOULDERS: Primary | ICD-10-CM

## 2023-10-25 RX ORDER — DICLOFENAC SODIUM 10 MG/G
GEL TOPICAL
Qty: 100 G | Refills: 2 | Status: SHIPPED | OUTPATIENT
Start: 2023-10-25 | End: 2023-12-05 | Stop reason: SDUPTHER

## 2023-10-25 NOTE — PROGRESS NOTES
BREAST SURGICAL ONCOLOGY FOLLOW UP     Subjective   Ruba Woodard is a 53 y.o. female presents today for follow up carcinoma of the left breast.   She denies breast pain, breast lumps or nipple discharge. She denies any change in the skin of the breast or the contour of the breast.  No complaints or concerns.    Treatment history:  Stage IA left breast cancer diagnosed in November 2022. Invasive ductal carcinoma, grade 2, ductal carcinoma in situ, ER + >95%, SD + 70%, HER2 equivocal, not amplified per dual shaq, mammaprint Low risk, luminal type A, +0.505.     She had a left lumpectomy and sentinel lymph node biopsy in December 2022.  Final pathology yielded invasive ductal carcinoma, ductal carcinoma in situ, 5 mm, 0/2 lymph nodes involved, negative margins, [id number], Stage IA.   She had radiation therapy in February 2023. (Dr. Eagle)  She began letrozole in March 2023 and noticed a lot of joint pain. She was switched to anastrozole but then began having weight gain, breast tenderness and joint pain. She is on letrozole. (Dr. Olivera)     Surgery: Left lumpectomy and sentinel lymph node biopsy December 2022  Radiation: February 2023  Systemic therapy: Aromatase inhibitor started in March 2023    Mammogram: She had a bilateral mammogram on November 1, 2023 which showed posttreatment changes in the left breast.  No evidence of malignancy.  Category 2.    Radiology review: All images and reports were personally reviewed and the findings discussed with the patient.     Review of Systems   Constitutional symptoms: Denies generalized fatigue.  Denies weight change, fevers/chills, difficulty sleeping   Eyes: Denies double vision, glaucoma, cataracts.  Ear/nose/throat/mouth: Denies hearing changes, sore throat, sinus problems.  Cardiovascular: No chest pain.  Denies irregular heartbeat.  Denies ankle swelling.  Respiratory: No wheezing, cough, or shortness of breath.  Gastrointestinal: No abdominal pain,  No  nausea/vomiting.  No indigestion/heartburn.  No change in bowel habits.  No constipation or diarrhea.   Genitourinary: No urinary incontinence.  No urinary frequency.  No painful urination.  Musculoskeletal: No bone pain, no muscle pain, no joint pain.   Integumentary: No rash. No masses.  No changes in moles.  No easy bruising.  Neurological: No headaches.  No tremors. No numbness/tingling.  Psychiatric: No anxiety. No depression.  Endocrine: No excessive thirst.  Not too hot or too cold.  Not tired or fatigued.    Hematological/lymphatic: No swollen glands or blood clotting problems.  No bruising.    PHYSICAL EXAM:    General: Alert and oriented x 3.  Mood and affect are appropriate.  Neck: supple, no masses, no cervical adenopathy.  Cardiovascular: no lower extremity edema.  Pulmonary: breathing non labored on room air.  GI: Abdomen soft, no masses. No hepatomegaly or splenomegaly.  Lymph nodes: No supraclavicular or axillary adenopathy bilaterally.  Musculoskeletal: Full range of motion in the upper extremities bilaterally.  Neuro: denies dizziness, tremors  Breast exam: Lymph node exam shows no cervical, supraclavicular, or axillary lymphadenopathy.  Breast exam shows symmetric breasts bilaterally with no skin changes, no dominant masses and no nipple discharge in either breast.      Assessment/Plan     Stage IA left breast cancer diagnosed in November 2022.   Invasive ductal carcinoma, grade 2, ductal carcinoma in situ, ER + >95%, IN + 70%, HER2 equivocal, not amplified per dual shaq,   mammaprint Low risk, luminal type A, +0.505.  -[id number]    Clinical breast exam and mammogram today are normal.  There is no evidence of cancer recurrence.    Continue follow up with medical oncology as scheduled.    Will follow up with me in 1 year at the time of mammogram or sooner if there are any breast concerns.     Nely Chavez MD

## 2023-10-26 ENCOUNTER — HOSPITAL ENCOUNTER (OUTPATIENT)
Dept: GASTROENTEROLOGY | Facility: HOSPITAL | Age: 53
Setting detail: OUTPATIENT SURGERY
Discharge: HOME | End: 2023-10-26
Payer: COMMERCIAL

## 2023-10-26 ENCOUNTER — ANESTHESIA (OUTPATIENT)
Dept: GASTROENTEROLOGY | Facility: HOSPITAL | Age: 53
End: 2023-10-26
Payer: COMMERCIAL

## 2023-10-26 ENCOUNTER — ANESTHESIA EVENT (OUTPATIENT)
Dept: GASTROENTEROLOGY | Facility: HOSPITAL | Age: 53
End: 2023-10-26
Payer: COMMERCIAL

## 2023-10-26 VITALS
HEART RATE: 72 BPM | RESPIRATION RATE: 17 BRPM | TEMPERATURE: 97.7 F | DIASTOLIC BLOOD PRESSURE: 77 MMHG | SYSTOLIC BLOOD PRESSURE: 135 MMHG | OXYGEN SATURATION: 99 %

## 2023-10-26 DIAGNOSIS — Z12.11 ENCOUNTER FOR SCREENING FOR MALIGNANT NEOPLASM OF COLON: ICD-10-CM

## 2023-10-26 PROCEDURE — 2500000004 HC RX 250 GENERAL PHARMACY W/ HCPCS (ALT 636 FOR OP/ED): Performed by: ANESTHESIOLOGIST ASSISTANT

## 2023-10-26 PROCEDURE — A45378 PR COLONOSCOPY,DIAGNOSTIC: Performed by: ANESTHESIOLOGIST ASSISTANT

## 2023-10-26 PROCEDURE — G0121 COLON CA SCRN NOT HI RSK IND: HCPCS | Performed by: COLON & RECTAL SURGERY

## 2023-10-26 PROCEDURE — 7100000010 HC PHASE TWO TIME - EACH INCREMENTAL 1 MINUTE

## 2023-10-26 PROCEDURE — 45378 DIAGNOSTIC COLONOSCOPY: CPT | Performed by: COLON & RECTAL SURGERY

## 2023-10-26 PROCEDURE — 7100000009 HC PHASE TWO TIME - INITIAL BASE CHARGE

## 2023-10-26 PROCEDURE — 3700000002 HC GENERAL ANESTHESIA TIME - EACH INCREMENTAL 1 MINUTE

## 2023-10-26 PROCEDURE — 3700000001 HC GENERAL ANESTHESIA TIME - INITIAL BASE CHARGE

## 2023-10-26 PROCEDURE — A45378 PR COLONOSCOPY,DIAGNOSTIC: Performed by: ANESTHESIOLOGY

## 2023-10-26 RX ORDER — PROPOFOL 10 MG/ML
INJECTION, EMULSION INTRAVENOUS CONTINUOUS PRN
Status: DISCONTINUED | OUTPATIENT
Start: 2023-10-26 | End: 2023-10-26

## 2023-10-26 RX ORDER — MIDAZOLAM HYDROCHLORIDE 1 MG/ML
INJECTION, SOLUTION INTRAMUSCULAR; INTRAVENOUS AS NEEDED
Status: DISCONTINUED | OUTPATIENT
Start: 2023-10-26 | End: 2023-10-26

## 2023-10-26 RX ORDER — PROPOFOL 10 MG/ML
INJECTION, EMULSION INTRAVENOUS AS NEEDED
Status: DISCONTINUED | OUTPATIENT
Start: 2023-10-26 | End: 2023-10-26

## 2023-10-26 RX ADMIN — SODIUM CHLORIDE, POTASSIUM CHLORIDE, SODIUM LACTATE AND CALCIUM CHLORIDE: 600; 310; 30; 20 INJECTION, SOLUTION INTRAVENOUS at 10:01

## 2023-10-26 RX ADMIN — MIDAZOLAM HYDROCHLORIDE 2 MG: 1 INJECTION INTRAMUSCULAR; INTRAVENOUS at 09:57

## 2023-10-26 RX ADMIN — PROPOFOL 50 MG: 10 INJECTION, EMULSION INTRAVENOUS at 10:10

## 2023-10-26 RX ADMIN — PROPOFOL 100 MCG/KG/MIN: 10 INJECTION, EMULSION INTRAVENOUS at 10:10

## 2023-10-26 RX ADMIN — PROPOFOL 20 MG: 10 INJECTION, EMULSION INTRAVENOUS at 10:16

## 2023-10-26 ASSESSMENT — COLUMBIA-SUICIDE SEVERITY RATING SCALE - C-SSRS
6. HAVE YOU EVER DONE ANYTHING, STARTED TO DO ANYTHING, OR PREPARED TO DO ANYTHING TO END YOUR LIFE?: NO
1. IN THE PAST MONTH, HAVE YOU WISHED YOU WERE DEAD OR WISHED YOU COULD GO TO SLEEP AND NOT WAKE UP?: NO
2. HAVE YOU ACTUALLY HAD ANY THOUGHTS OF KILLING YOURSELF?: NO

## 2023-10-26 ASSESSMENT — PAIN - FUNCTIONAL ASSESSMENT
PAIN_FUNCTIONAL_ASSESSMENT: 0-10

## 2023-10-26 ASSESSMENT — PAIN SCALES - GENERAL
PAINLEVEL_OUTOF10: 0 - NO PAIN

## 2023-10-26 NOTE — ANESTHESIA PREPROCEDURE EVALUATION
Patient: Ruba Woodard    Procedure Information       Date/Time: 10/26/23 1010    Scheduled providers: Catina Cat MD    Procedure: COLONOSCOPY    Location: [facility] [place]            Relevant Problems   Cardiovascular   (+) Hyperlipidemia      Endocrine   (+) Hypothyroidism      Neuro/Psych   (+) Bilateral occipital neuralgia   (+) Nightmares   (+) PTSD (post-traumatic stress disorder)      Musculoskeletal   (+) Localized primary osteoarthritis of carpometacarpal joint of left thumb      Other   (+) Arthritis of both knees   (+) Patellofemoral arthritis       Clinical information reviewed:   Tobacco  Allergies  Meds   Med Hx  Surg Hx   Fam Hx  Soc Hx        NPO/Void Status  Carbonhydrate Drink Given Prior to Surgery? : N  Date of Last Liquid: 10/26/23  Time of Last Liquid: 0700  Date of Last Solid: 10/25/23  Time of Last Solid: 0800  Last Intake Type: Clear fluids  Time of Last Void: 0700           Past Medical History:   Diagnosis Date    Contact with and (suspected) exposure to potentially hazardous body fluids 12/19/2019    Accidental hypodermic needlestick injury with exposure to body fluid    Parageusia 07/16/2020    Loss of taste    Personal history of malignant neoplasm of breast 12/16/2022    History of malignant neoplasm of left breast    Personal history of other endocrine, nutritional and metabolic disease 10/07/2020    History of thyrotoxicosis      Past Surgical History:   Procedure Laterality Date    ADENOIDECTOMY  11/12/2013    Adenoidectomy    ANKLE ARTHROSCOPY W/ OPEN REPAIR  11/12/2013    Ankle Repair    BI MAMMO GUIDED LOCALIZATION BREAST RIGHT Right 2/20/2020    BI MAMMO GUIDED LOCALIZATION BREAST RIGHT 2/20/2020 [facility]    OTHER SURGICAL HISTORY  04/21/2021    Tonsillectomy    OTHER SURGICAL HISTORY  04/21/2021    Knee surgery    OTHER SURGICAL HISTORY  04/21/2021    Bunionectomy    OTHER SURGICAL HISTORY  04/21/2021    Breast surgery    OTHER SURGICAL HISTORY   "04/21/2021    Endometrial ablation    TONSILLECTOMY  11/12/2013    Tonsillectomy     Social History     Tobacco Use    Smoking status: Never    Smokeless tobacco: Never      Current Outpatient Medications   Medication Instructions    acetaminophen (Tylenol) 500 mg tablet 2 tablets, oral, Every 6 hours PRN    ascorbic acid (vitamin C) 500 mg, oral    clindamycin (Cleocin T) 1 % lotion 1 Application, Topical    cyclobenzaprine (FLEXERIL) 10 mg, oral, 2 times daily PRN    diclofenac sodium (Voltaren) 1 % gel gel Apply sparingly to affected areas once daily    fluocinonide 0.05 % cream 1 Application, Topical    ibuprofen 200 mg, oral    letrozole (FEMARA) 2.5 mg, oral, Daily, Take with or without food.    lidocaine (Lidoderm) 5 % patch USE AS DIRECTED ON  PACKAGE    LORazepam (ATIVAN) 0.5 mg, oral, Every 8 hours PRN    methylPREDNISolone (Medrol, Barry,) 4 mg tablets Follow schedule on package instructions    multivitamin (Multiple Vitamins) tablet oral    ondansetron ODT (Zofran-ODT) 4 mg disintegrating tablet 1 tablet, oral, Every 6 hours PRN    triamcinolone (Kenalog) 0.1 % cream 1 Application, Topical      Allergies   Allergen Reactions    Hydrocodone Unknown    Scopolamine Other     Pain  stiffness    Morphine Other        Chemistry    Lab Results   Component Value Date/Time     09/26/2023 0924    K 4.7 09/26/2023 0924     09/26/2023 0924    CO2 26 09/26/2023 0924    BUN 18 09/26/2023 0924    CREATININE 0.81 09/26/2023 0924    Lab Results   Component Value Date/Time    CALCIUM 9.0 09/26/2023 0924    ALKPHOS 55 09/26/2023 0924    AST 19 09/26/2023 0924    ALT 10 09/26/2023 0924    BILITOT 0.5 09/26/2023 0924          Lab Results   Component Value Date/Time    WBC 3.9 (L) 09/26/2023 0924    HGB 12.3 09/26/2023 0924    HCT 38.1 09/26/2023 0924     09/26/2023 0924     No results found for: \"PROTIME\", \"PTT\", \"INR\"  Encounter Date: 09/26/23   ECG 12 lead (Clinic Performed)    Narrative    Sinus rhythm " without acute changes     No results found for this or any previous visit from the past 1095 days.       Visit Vitals  /75 (BP Location: Right arm, Patient Position: Sitting)   Pulse 77   Temp 36.4 °C (97.5 °F) (Temporal)   Resp 16   SpO2 100%   OB Status Postmenopausal   Smoking Status Never        Anesthesia Evaluation      No history of anesthetic complications   Airway   Mallampati: III  TM distance: >3 FB  Neck ROM: full  Dental - normal exam     Pulmonary     breath sounds clear to auscultation  Cardiovascular     Rhythm: regular  Rate: normal    Neuro/Psych      GI/Hepatic/Renal      Endo/Other    Abdominal  - normal exam                      Physical Exam    Airway  Mallampati: III  TM distance: >3 FB  Neck ROM: full     Cardiovascular   Rhythm: regular  Rate: normal     Dental - normal exam     Pulmonary   Breath sounds clear to auscultation     Abdominal - normal exam              Anesthesia Plan    ASA 2     MAC     intravenous induction   Anesthetic plan and risks discussed with patient.

## 2023-10-26 NOTE — H&P
History Of Present Illness  Ruba Woodard is a 53 y.o. female presenting with Hx of TA's.     Past Medical History  Past Medical History:   Diagnosis Date    Contact with and (suspected) exposure to potentially hazardous body fluids 12/19/2019    Accidental hypodermic needlestick injury with exposure to body fluid    Parageusia 07/16/2020    Loss of taste    Personal history of malignant neoplasm of breast 12/16/2022    History of malignant neoplasm of left breast    Personal history of other endocrine, nutritional and metabolic disease 10/07/2020    History of thyrotoxicosis       Surgical History  Past Surgical History:   Procedure Laterality Date    ADENOIDECTOMY  11/12/2013    Adenoidectomy    ANKLE ARTHROSCOPY W/ OPEN REPAIR  11/12/2013    Ankle Repair    BI MAMMO GUIDED LOCALIZATION BREAST RIGHT Right 2/20/2020    BI MAMMO GUIDED LOCALIZATION BREAST RIGHT 2/20/2020 [facility]    OTHER SURGICAL HISTORY  04/21/2021    Tonsillectomy    OTHER SURGICAL HISTORY  04/21/2021    Knee surgery    OTHER SURGICAL HISTORY  04/21/2021    Bunionectomy    OTHER SURGICAL HISTORY  04/21/2021    Breast surgery    OTHER SURGICAL HISTORY  04/21/2021    Endometrial ablation    TONSILLECTOMY  11/12/2013    Tonsillectomy        Social History  She reports that she has never smoked. She has never used smokeless tobacco. No history on file for alcohol use and drug use.    Family History  Family History   Problem Relation Name Age of Onset    Breast cancer Mother      Multiple sclerosis Mother      Prostate cancer Paternal Grandfather      Colon cancer Other Grandfather     Other (Colon cancer) Other Spouse     Hyperlipidemia Other Family hx         Allergies  Hydrocodone, Scopolamine, and Morphine    Review of Systems     Physical Exam  Gen - well appearing NAD  CV: RRR  Lungs: CTA B/L  Abd -soft      Last Recorded Vitals  Blood pressure 138/75, pulse 77, temperature 36.4 °C (97.5 °F), temperature source Temporal, resp. rate  16, SpO2 100 %.         Assessment/Plan   Active Problems:  There are no active Hospital Problems.    Hx of polyps    colonoscopy       I spent 5 minutes in the professional and overall care of this patient.      Catina Cat MD

## 2023-10-26 NOTE — POST-PROCEDURE NOTE
1033- Patient to Endoscopy bay at this time in stable condition. Bedside monitors applied to patient upon arrival. Report received from GI Team at bedside.    1035- Patient on RA at this time    1040- Patient tolerating sips of apple juice at this time. Denies nausea    1045- Patient tolerating altaf crackers at this time. Denies nausea    1047- Dr. Cat at bedside talking with patient at this time    1054-Discharge instructions went over with patient and patient's sister in law at this time. Patient and patient's sister in law verified understanding verbally    1103- Criteria met for patient to discharge from phase II at this time.     1104- Patient helped to get dressed at this time    1113- Patient transported to main [facility] at this time in stable condition via wheelchair.

## 2023-10-26 NOTE — DISCHARGE INSTRUCTIONS
Patient Instructions after a Colonoscopy      The anesthetics, sedatives or narcotics which were given to you today will be acting in your body for the next 24 hours, so you might feel a little sleepy or groggy.  This feeling should slowly wear off. Carefully read and follow the instructions.     You received sedation today:  - Do not drive or operate any machinery or power tools of any kind.   - No alcoholic beverages today, not even beer or wine.  - Do not make any important decisions or sign any legal documents.  - No over the counter medications that contain alcohol or that may cause drowsiness.  - Do not make any important decisions or sign any legal documents.    While it is common to experience mild to moderate abdominal distention, gas, or belching after your procedure, if any of these symptoms occur following discharge from the GI Lab or within one week of having your procedure, call the [facility] to be advised whether a visit to your nearest Urgent Care or Emergency Department is indicated.  Take this paper with you if you go.     - If you develop an allergic reaction to the medications that were given during your procedure such as difficulty breathing, rash, hives, severe nausea, vomiting or lightheadedness.  - If you experience chest pain, shortness of breath, severe abdominal pain, fevers and chills.  -If you develop signs and symptoms of bleeding such as blood in your spit, if your stools turn black, tarry, or bloody  - If you have not urinated within 8 hours following your procedure.  - If your IV site becomes painful, red, inflamed, or looks infected.    If you received a biopsy/polypectomy/sphincterotomy the following instructions apply below:    __ Do not use Aspirin containing products, non-steroidal medications or anti-coagulants for one week following your procedure. (Examples of these types of medications are: Advil, Arthrotec, Aleve, Coumadin, Ecotrin, Heparin, Ibuprofen,  Indocin, Motrin, Naprosyn, Nuprin, Plavix, Vioxx, and Voltarin, or their generic forms.  This list is not all-inclusive.  Check with your physician or pharmacist before resuming medications.)   __ Eat a soft diet today.  Avoid foods that are poorly digested for the next 24 hours.  These foods would include: nuts, beans, lettuce, red meats, and fried foods. Start with liquids and advance your diet as tolerated, gradually work up to eating solids.   __ Do not have a Barium Study or Enema for one week.    Your physician recommends the additional following instructions:    -You have a contact number available for emergencies. The signs and symptoms of potential delayed complications were discussed with you. You may return to normal activities tomorrow.  -Resume your previous diet.  -Continue your present medications.   -We are waiting for your pathology results.  -Your physician has recommended a repeat colonoscopy (date to be determined after pending pathology results are reviewed) for surveillance based on pathology results.  -The findings and recommendations have been discussed with you.  -The findings and recommendations were discussed with your family.  - Please see Medication Reconciliation Form for new medication/medications prescribed.       If you experience any problems or have any questions following discharge from the GI Lab, please call:        Nurse Signature                                                                        Date___________________                                                                            Patient/Responsible Party Signature                                        Date___________________

## 2023-10-26 NOTE — ANESTHESIA POSTPROCEDURE EVALUATION
Patient: Ruba Woodard    Procedure Summary       Date: 10/26/23 Room / Location: [facility] [place]    Anesthesia Start: 0951 Anesthesia Stop: 1039    Procedure: COLONOSCOPY Diagnosis: Encounter for screening for malignant neoplasm of colon    Scheduled Providers: Catina Cat MD Responsible Provider: Franki Oquendo MD    Anesthesia Type: MAC ASA Status: 2            Anesthesia Type: MAC    Vitals Value Taken Time   /77 10/26/23 1103   Temp 36.5 °C (97.7 °F) 10/26/23 1033   Pulse 72 10/26/23 1103   Resp 17 10/26/23 1103   SpO2 99 % 10/26/23 1103       Anesthesia Post Evaluation    Patient location during evaluation: bedside  Patient participation: complete - patient participated  Level of consciousness: awake and alert  Pain management: satisfactory to patient  Airway patency: patent  Cardiovascular status: acceptable  Respiratory status: acceptable  Hydration status: acceptable        No notable events documented.

## 2023-10-26 NOTE — LETTER
Catina Cat MD   [facility]   [address] [address]   [phone]      Dear Ms. Woodard,       It was my pleasure to see you at your recent colonoscopy.  At that time,  your examination was completely normal.  The current recommendation is to repeat the colonoscopy in 5 years.      Thank you very much for allowing me to take part in your care, please feel free to contact me with any questions or concerns at [phone].          Sincerely,       Catina Cat M.D. FACS, FASCRS    CC:  Primary Care:

## 2023-10-31 DIAGNOSIS — F51.04 PSYCHOPHYSIOLOGICAL INSOMNIA: ICD-10-CM

## 2023-10-31 DIAGNOSIS — F51.5 NIGHTMARES: ICD-10-CM

## 2023-10-31 RX ORDER — LORAZEPAM 0.5 MG/1
0.5 TABLET ORAL EVERY 8 HOURS PRN
Qty: 21 TABLET | Refills: 0 | OUTPATIENT
Start: 2023-10-31 | End: 2023-11-07

## 2023-10-31 NOTE — TELEPHONE ENCOUNTER
Pt called and states she needs refill of ativan   Was last sent for 21 tabs back in September     Please advise

## 2023-11-01 ENCOUNTER — ANCILLARY PROCEDURE (OUTPATIENT)
Dept: RADIOLOGY | Facility: CLINIC | Age: 53
End: 2023-11-01
Payer: COMMERCIAL

## 2023-11-01 DIAGNOSIS — C50.412 MALIGNANT NEOPLASM OF UPPER-OUTER QUADRANT OF LEFT FEMALE BREAST (MULTI): ICD-10-CM

## 2023-11-01 DIAGNOSIS — Z17.0 ESTROGEN RECEPTOR POSITIVE STATUS (ER+): ICD-10-CM

## 2023-11-01 PROCEDURE — 77066 DX MAMMO INCL CAD BI: CPT | Performed by: RADIOLOGY

## 2023-11-01 PROCEDURE — 77062 BREAST TOMOSYNTHESIS BI: CPT | Performed by: RADIOLOGY

## 2023-11-01 PROCEDURE — 77066 DX MAMMO INCL CAD BI: CPT

## 2023-11-03 ENCOUNTER — OFFICE VISIT (OUTPATIENT)
Dept: SURGICAL ONCOLOGY | Facility: CLINIC | Age: 53
End: 2023-11-03
Payer: COMMERCIAL

## 2023-11-03 VITALS
BODY MASS INDEX: 21.38 KG/M2 | DIASTOLIC BLOOD PRESSURE: 64 MMHG | SYSTOLIC BLOOD PRESSURE: 114 MMHG | WEIGHT: 133 LBS | HEIGHT: 66 IN | HEART RATE: 88 BPM

## 2023-11-03 DIAGNOSIS — Z17.0 MALIGNANT NEOPLASM OF UPPER-OUTER QUADRANT OF LEFT BREAST IN FEMALE, ESTROGEN RECEPTOR POSITIVE (MULTI): ICD-10-CM

## 2023-11-03 DIAGNOSIS — C50.412 MALIGNANT NEOPLASM OF UPPER-OUTER QUADRANT OF LEFT BREAST IN FEMALE, ESTROGEN RECEPTOR POSITIVE (MULTI): ICD-10-CM

## 2023-11-03 PROCEDURE — 99213 OFFICE O/P EST LOW 20 MIN: CPT | Performed by: SURGERY

## 2023-11-03 PROCEDURE — 1036F TOBACCO NON-USER: CPT | Performed by: SURGERY

## 2023-11-03 ASSESSMENT — PAIN SCALES - GENERAL: PAINLEVEL: 0-NO PAIN

## 2023-11-08 DIAGNOSIS — M25.512 CHRONIC LEFT SHOULDER PAIN: ICD-10-CM

## 2023-11-08 DIAGNOSIS — G89.29 CHRONIC LEFT SHOULDER PAIN: ICD-10-CM

## 2023-11-08 RX ORDER — LIDOCAINE 50 MG/G
PATCH TOPICAL
Qty: 30 PATCH | Refills: 0 | Status: SHIPPED | OUTPATIENT
Start: 2023-11-08 | End: 2023-12-06 | Stop reason: SDUPTHER

## 2023-11-14 ENCOUNTER — OFFICE VISIT (OUTPATIENT)
Dept: CARDIOLOGY | Facility: CLINIC | Age: 53
End: 2023-11-14
Payer: COMMERCIAL

## 2023-11-14 VITALS
HEIGHT: 66 IN | WEIGHT: 136.5 LBS | BODY MASS INDEX: 21.94 KG/M2 | HEART RATE: 88 BPM | SYSTOLIC BLOOD PRESSURE: 111 MMHG | DIASTOLIC BLOOD PRESSURE: 65 MMHG

## 2023-11-14 DIAGNOSIS — E78.2 MIXED HYPERLIPIDEMIA: ICD-10-CM

## 2023-11-14 DIAGNOSIS — E78.01 FAMILIAL HYPERCHOLESTEROLEMIA: Primary | ICD-10-CM

## 2023-11-14 PROCEDURE — 1036F TOBACCO NON-USER: CPT | Performed by: INTERNAL MEDICINE

## 2023-11-14 PROCEDURE — 99213 OFFICE O/P EST LOW 20 MIN: CPT | Performed by: INTERNAL MEDICINE

## 2023-11-14 PROCEDURE — 99203 OFFICE O/P NEW LOW 30 MIN: CPT | Performed by: INTERNAL MEDICINE

## 2023-11-14 NOTE — PROGRESS NOTES
"Chief Complaint:   Hyperlipidemia     History Of Present Illness:    Ruba Woodard is a 53 y.o. female presenting with FH.  She is not taking statins.  She has no Hx CV disease.  Prior CAC=0 and Carotid u/s negative. Exercises regularly.  No CP or LEE.  No Hx TIA/CVA/PAD. FH in father MI 60s.     Last Recorded Vitals:  Visit Vitals  /65 (BP Location: Right arm, Patient Position: Sitting, BP Cuff Size: Adult)   Pulse 88   Ht 1.676 m (5' 6\")   Wt 61.9 kg (136 lb 8 oz)   LMP  (LMP Unknown)   BMI 22.03 kg/m²   OB Status Postmenopausal   Smoking Status Never   BSA 1.7 m²        Past Medical History:  She has a past medical history of Contact with and (suspected) exposure to potentially hazardous body fluids (12/19/2019), Parageusia (07/16/2020), Personal history of malignant neoplasm of breast (12/16/2022), and Personal history of other endocrine, nutritional and metabolic disease (10/07/2020).    Past Surgical History:  She has a past surgical history that includes Tonsillectomy (11/12/2013); Adenoidectomy (11/12/2013); Ankle arthroscopy w/ open repair (11/12/2013); Other surgical history (04/21/2021); Other surgical history (04/21/2021); Other surgical history (04/21/2021); Other surgical history (04/21/2021); Other surgical history (04/21/2021); BI mammo guided breast right localization (Right, 02/20/2020); Breast lumpectomy (Left, 12/14/2022); and Breast biopsy (Right, 11/21/2022).      Social History:  She reports that she has never smoked. She has never used smokeless tobacco. No history on file for alcohol use and drug use.    Family History:  Family History   Problem Relation Name Age of Onset    Breast cancer Mother      Multiple sclerosis Mother      Prostate cancer Paternal Grandfather      Colon cancer Other Grandfather     Other (Colon cancer) Other Spouse     Hyperlipidemia Other Family hx         Allergies:  Hydrocodone, Scopolamine, and Morphine    Outpatient Medications:  Current Outpatient " "Medications   Medication Instructions    acetaminophen (Tylenol) 500 mg tablet 2 tablets, oral, Every 6 hours PRN    ascorbic acid (vitamin C) 500 mg, oral    clindamycin (Cleocin T) 1 % lotion 1 Application, Topical    cyclobenzaprine (FLEXERIL) 10 mg, oral, 2 times daily PRN    diclofenac sodium (Voltaren) 1 % gel gel Apply sparingly to affected areas once daily    fluocinonide 0.05 % cream 1 Application, Topical    ibuprofen 200 mg, oral    letrozole (FEMARA) 2.5 mg, oral, Daily, Take with or without food.    lidocaine (Lidoderm) 5 % patch USE AS DIRECTED ON PACKAGE    LORazepam (ATIVAN) 0.5 mg, oral, Every 8 hours PRN    multivitamin (Multiple Vitamins) tablet oral    ondansetron ODT (Zofran-ODT) 4 mg disintegrating tablet 1 tablet, oral, Every 6 hours PRN    triamcinolone (Kenalog) 0.1 % cream 1 Application, Topical       Physical Exam:       Last Labs:  CBC -  Lab Results   Component Value Date    WBC 3.9 (L) 09/26/2023    HGB 12.3 09/26/2023    HCT 38.1 09/26/2023    MCV 91 09/26/2023     09/26/2023       CMP -  Lab Results   Component Value Date    CALCIUM 9.0 09/26/2023    PROT 6.3 (L) 09/26/2023    ALBUMIN 4.1 09/26/2023    ALBUMIN 4.6 10/09/2020    AST 19 09/26/2023    ALT 10 09/26/2023    ALKPHOS 55 09/26/2023    BILITOT 0.5 09/26/2023       LIPID PANEL -   Lab Results   Component Value Date    CHOL 325 (H) 09/26/2023    HDL 73.6 09/26/2023    CHHDL 4.4 09/26/2023    VLDL 25 09/26/2023    TRIG 123 09/26/2023       RENAL FUNCTION PANEL -   Lab Results   Component Value Date    K 4.7 09/26/2023       No results found for: \"BNP\", \"HGBA1C\"    Last Cardiology Tests:  ECG:  ECG 12 lead (Clinic Performed) 9/26/2023    Echo:  No echocardiogram results found for the past 12 months           Assessment/Plan     Familial hypercholesterolemia  She has FH but no CV disease.  Mother has FH as well but no CV disease and is 80. Will check NMR.  - CT cardiac scoring wo IV contrast; Future       Wally Crowe, " MD    Exclusive of any other services or procedures performed, IWally MD , spent 30 minutes in duration for this visit today.  This time consisted of chart review, obtaining history, and/or performing the exam as documented above as well as documenting the clinical information for the encounter in the electronic record, discussing treatment options, plans, and/or goals with patient, family, and/or caregiver, refilling medications, updating the electronic record, ordering medicines, lab work, imaging, referrals, and/or procedures as documented above and communicating with other [facility] professionals. I have discussed the results of laboratory, radiology, and cardiology studies with the patient and their family/caregiver.

## 2023-11-15 ASSESSMENT — DERMATOLOGY QUALITY OF LIFE (QOL) ASSESSMENT
RATE HOW BOTHERED YOU ARE BY EFFECTS OF YOUR SKIN PROBLEMS ON YOUR ACTIVITIES (EG, GOING OUT, ACCOMPLISHING WHAT YOU WANT, WORK ACTIVITIES OR YOUR RELATIONSHIPS WITH OTHERS): 0 - NEVER BOTHERED
RATE HOW BOTHERED YOU ARE BY EFFECTS OF YOUR SKIN PROBLEMS ON YOUR ACTIVITIES (EG, GOING OUT, ACCOMPLISHING WHAT YOU WANT, WORK ACTIVITIES OR YOUR RELATIONSHIPS WITH OTHERS): 0 - NEVER BOTHERED
RATE HOW BOTHERED YOU ARE BY SYMPTOMS OF YOUR SKIN PROBLEM (EG, ITCHING, STINGING BURNING, HURTING OR SKIN IRRITATION): 4
RATE HOW BOTHERED YOU ARE BY EFFECTS OF YOUR SKIN PROBLEMS ON YOUR ACTIVITIES (EG, GOING OUT, ACCOMPLISHING WHAT YOU WANT, WORK ACTIVITIES OR YOUR RELATIONSHIPS WITH OTHERS): 0 - NEVER BOTHERED
RATE HOW EMOTIONALLY BOTHERED YOU ARE BY YOUR SKIN PROBLEM (FOR EXAMPLE, WORRY, EMBARRASSMENT, FRUSTRATION): 1
RATE HOW BOTHERED YOU ARE BY SYMPTOMS OF YOUR SKIN PROBLEM (EG, ITCHING, STINGING BURNING, HURTING OR SKIN IRRITATION): 4
RATE HOW BOTHERED YOU ARE BY SYMPTOMS OF YOUR SKIN PROBLEM (EG, ITCHING, STINGING BURNING, HURTING OR SKIN IRRITATION): 4
RATE HOW EMOTIONALLY BOTHERED YOU ARE BY YOUR SKIN PROBLEM (FOR EXAMPLE, WORRY, EMBARRASSMENT, FRUSTRATION): 1
RATE HOW EMOTIONALLY BOTHERED YOU ARE BY YOUR SKIN PROBLEM (FOR EXAMPLE, WORRY, EMBARRASSMENT, FRUSTRATION): 1

## 2023-11-16 DIAGNOSIS — C50.412 MALIGNANT NEOPLASM OF UPPER-OUTER QUADRANT OF LEFT BREAST IN FEMALE, ESTROGEN RECEPTOR POSITIVE (MULTI): Primary | ICD-10-CM

## 2023-11-16 DIAGNOSIS — Z17.0 MALIGNANT NEOPLASM OF UPPER-OUTER QUADRANT OF LEFT BREAST IN FEMALE, ESTROGEN RECEPTOR POSITIVE (MULTI): Primary | ICD-10-CM

## 2023-11-21 ENCOUNTER — LAB (OUTPATIENT)
Dept: LAB | Facility: LAB | Age: 53
End: 2023-11-21
Payer: COMMERCIAL

## 2023-11-21 ENCOUNTER — APPOINTMENT (OUTPATIENT)
Dept: DERMATOLOGY | Facility: CLINIC | Age: 53
End: 2023-11-21
Payer: COMMERCIAL

## 2023-11-21 ENCOUNTER — OFFICE VISIT (OUTPATIENT)
Dept: DERMATOLOGY | Facility: CLINIC | Age: 53
End: 2023-11-21
Payer: COMMERCIAL

## 2023-11-21 DIAGNOSIS — E78.01 FAMILIAL HYPERCHOLESTEROLEMIA: ICD-10-CM

## 2023-11-21 DIAGNOSIS — D22.5 MELANOCYTIC NEVUS OF TRUNK: ICD-10-CM

## 2023-11-21 DIAGNOSIS — D48.5 NEOPLASM OF UNCERTAIN BEHAVIOR OF SKIN: Primary | ICD-10-CM

## 2023-11-21 DIAGNOSIS — L30.1 DYSHIDROTIC ECZEMA: ICD-10-CM

## 2023-11-21 DIAGNOSIS — L57.0 ACTINIC KERATOSIS: ICD-10-CM

## 2023-11-21 DIAGNOSIS — L82.1 SEBORRHEIC KERATOSIS: ICD-10-CM

## 2023-11-21 DIAGNOSIS — E78.2 MIXED HYPERLIPIDEMIA: ICD-10-CM

## 2023-11-21 DIAGNOSIS — L57.8 DIFFUSE PHOTODAMAGE OF SKIN: ICD-10-CM

## 2023-11-21 DIAGNOSIS — L81.4 LENTIGO: ICD-10-CM

## 2023-11-21 DIAGNOSIS — Z85.828 HISTORY OF NONMELANOMA SKIN CANCER: ICD-10-CM

## 2023-11-21 PROCEDURE — 17003 DESTRUCT PREMALG LES 2-14: CPT | Performed by: DERMATOLOGY

## 2023-11-21 PROCEDURE — 83704 LIPOPROTEIN BLD QUAN PART: CPT

## 2023-11-21 PROCEDURE — 17000 DESTRUCT PREMALG LESION: CPT | Performed by: DERMATOLOGY

## 2023-11-21 PROCEDURE — 88305 TISSUE EXAM BY PATHOLOGIST: CPT | Mod: TC,DER | Performed by: DERMATOLOGY

## 2023-11-21 PROCEDURE — 36415 COLL VENOUS BLD VENIPUNCTURE: CPT

## 2023-11-21 PROCEDURE — 1036F TOBACCO NON-USER: CPT | Performed by: DERMATOLOGY

## 2023-11-21 PROCEDURE — 88305 TISSUE EXAM BY PATHOLOGIST: CPT | Performed by: DERMATOLOGY

## 2023-11-21 PROCEDURE — 99214 OFFICE O/P EST MOD 30 MIN: CPT | Performed by: DERMATOLOGY

## 2023-11-21 PROCEDURE — 11306 SHAVE SKIN LESION 0.6-1.0 CM: CPT | Performed by: DERMATOLOGY

## 2023-11-21 RX ORDER — TRIAMCINOLONE ACETONIDE 1 MG/G
CREAM TOPICAL
Qty: 80 G | Refills: 2 | Status: SHIPPED | OUTPATIENT
Start: 2023-11-21 | End: 2024-04-11 | Stop reason: WASHOUT

## 2023-11-21 ASSESSMENT — DERMATOLOGY QUALITY OF LIFE (QOL) ASSESSMENT
RATE HOW BOTHERED YOU ARE BY SYMPTOMS OF YOUR SKIN PROBLEM (EG, ITCHING, STINGING BURNING, HURTING OR SKIN IRRITATION): 4
RATE HOW BOTHERED YOU ARE BY EFFECTS OF YOUR SKIN PROBLEMS ON YOUR ACTIVITIES (EG, GOING OUT, ACCOMPLISHING WHAT YOU WANT, WORK ACTIVITIES OR YOUR RELATIONSHIPS WITH OTHERS): 0 - NEVER BOTHERED
RATE HOW EMOTIONALLY BOTHERED YOU ARE BY YOUR SKIN PROBLEM (FOR EXAMPLE, WORRY, EMBARRASSMENT, FRUSTRATION): 1

## 2023-11-21 NOTE — PROGRESS NOTES
Subjective     Ruba Woodard is a 53 y.o. female who presents for the following: Skin Exam.  She denies any new, changing, or concerning skin lesions since her last visit; no bleeding, itching, or burning lesions.  She notes intermittent red, itchy bumps and blisters on the sides of her fingers.      Review of Systems:  No other skin or systemic complaints other than what is documented elsewhere in the note.    The following portions of the chart were reviewed this encounter and updated as appropriate:       Skin Cancer History  No skin cancer on file.    Specialty Problems          Dermatology Problems    Actinic keratosis    Allergic contact dermatitis, unspecified cause    Dermatitis, unspecified    Dyshidrosis (pompholyx)    Hemangioma of skin and subcutaneous tissue    Melanocytic nevi of other parts of face    Melanocytic nevi of scalp and neck    Melanocytic nevi of trunk    Melanocytic nevi of unspecified lower limb, including hip    Melanocytic nevi of unspecified upper limb, including shoulder    Other melanin hyperpigmentation    Scar condition and fibrosis of skin    Squamous cell carcinoma of skin of left upper limb, including shoulder    Abrasion of left hand    Abrasion of anterior left lower leg    Contusion of left upper arm       Past Dermatologic / Past Relevant Medical History:    - history of BCC on mid-back diagnosed in May 2013 s/p ED&C  - SCC on left distal dorsal forearm diagnosed in January 2022 s/p wide local excision with 5-mm margins by Dr. 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  [place]    Allergies:  Hydrocodone, Scopolamine, and Morphine    Current Medications / CAM's:    Current Outpatient Medications:     acetaminophen (Tylenol) 500 mg tablet, Take 2 tablets (1,000 mg) by mouth every 6 hours if needed., Disp: , Rfl:     ascorbic acid, vitamin C, 500 mg capsule, Take 500 mg by mouth., Disp: , Rfl:     clindamycin (Cleocin T) 1 % lotion, Apply 1 Application topically., Disp: , Rfl:     cyclobenzaprine (Flexeril) 10 mg tablet, Take 1 tablet (10 mg) by mouth 2 times a day as needed for muscle spasms., Disp: 60 tablet, Rfl: 0    diclofenac sodium (Voltaren) 1 % gel gel, Apply sparingly to affected areas once daily, Disp: 100 g, Rfl: 2    fluocinonide 0.05 % cream, Apply 1 Application topically., Disp: , Rfl:     ibuprofen 200 mg tablet, Take 1 tablet (200 mg) by mouth., Disp: , Rfl:     letrozole (Femara) 2.5 mg tablet, Take 1 tablet (2.5 mg total) by mouth once daily.  Take with or without food., Disp: 360 tablet, Rfl: 0    lidocaine (Lidoderm) 5 % patch, USE AS DIRECTED ON PACKAGE, Disp: 30 patch, Rfl: 0    LORazepam (Ativan) 0.5 mg tablet, Take 1 tablet (0.5 mg) by mouth every 8 hours if needed for anxiety for up to 7 days., Disp: 21 tablet, Rfl: 0    multivitamin (Multiple Vitamins) tablet, Take by mouth., Disp: , Rfl:     ondansetron ODT (Zofran-ODT) 4 mg disintegrating tablet, Take 1 tablet (4 mg) by mouth every 6 hours if needed for nausea or vomiting., Disp: , Rfl:     triamcinolone (Kenalog) 0.1 % cream, Apply 1 Application topically., Disp: , Rfl:     triamcinolone (Kenalog) 0.1 % cream, Apply twice daily to affected areas of hands (avoid face, groin, body folds) for 2 weeks, then taper to twice daily on weekends only; repeat every 6-8 weeks as needed for flares, Disp: 80 g, Rfl: 2     Objective   Well appearing patient in no apparent distress; mood and affect are within normal limits.    A full examination was performed including scalp, face, eyes, ears, nose, lips, neck, chest,  axillae, abdomen, back, bilateral upper extremities, and bilateral lower extremities. All findings within normal limits unless otherwise noted below.    Assessment/Plan   1. Neoplasm of uncertain behavior of skin  Right Mid-Dorsal Hand  6 mm erythematous, scaly papule           Shave removal    Lesion length (cm):  0.6  Margin per side (cm):  0  Lesion diameter (cm):  0.6  Informed consent: discussed and consent obtained    Timeout: patient name, date of birth, surgical site, and procedure verified    Procedure prep:  Patient was prepped and draped  Anesthesia: the lesion was anesthetized in a standard fashion    Anesthetic:  1% lidocaine w/ epinephrine 1-100,000 local infiltration  Instrument used: flexible razor blade    Hemostasis achieved with: aluminum chloride    Outcome: patient tolerated procedure well    Post-procedure details: sterile dressing applied and wound care instructions given    Dressing type: bandage and petrolatum      Staff Communication: Dermatology Local Anesthesia: 1 % Lidocaine / Epinephrine - Amount:0.5ml    Specimen 1 - Dermatopathology- DERM LAB  Differential Diagnosis: HAK v SCCIS  Check Margins Yes/No?:    Comments:    Dermpath Lab: Routine Histopathology (formalin-fixed tissue)    2. Actinic keratosis (6)  Head - Anterior (Face) (6)  Scattered on the patient's nose and left lateral upper cheek, there are 6 erythematous, gritty, scaly macules     Actinic Keratoses -scattered on nose and left lateral upper cheek.  The pre-cancerous nature of these lesions and treatment options were discussed with the patient today.  At this time, I recommend treatment with liquid nitrogen cryotherapy.  The patient expressed understanding, is in agreement with this plan, and wishes to proceed with cryotherapy today.    Destr of lesion - Head - Anterior (Face)  Complexity: simple    Destruction method: cryotherapy    Informed consent: discussed and consent obtained    Lesion destroyed using liquid  nitrogen: Yes    Cryotherapy cycles:  1  Outcome: patient tolerated procedure well with no complications    Post-procedure details: wound care instructions given      3. Dyshidrotic eczema  Left Hand - Anterior, Right Hand - Anterior  On her bilateral hands, there are several erythematous papules and small clearish yellowish vesicles, mainly grouped along the medial and lateral aspects of several of her fingers    Dyshidrotic Eczema - bilateral hands.  The potentially chronic and intermittently flaring nature of this condition and treatment options were discussed extensively with the patient today.  At this time, I recommend topical steroid therapy with Triamcinolone 0.1% cream, which the patient was instructed to apply twice daily to the affected areas of her hands (avoid face, groin, body folds) for the next 2 weeks, followed by taper to twice daily on weekends only for persistent areas; the patient may repeat treatment in a 2-week burst-and-taper fashion every 6-8 weeks as needed for future flares.  I also emphasized the importance of dry, sensitive skin care and good hand hygiene, including minimizing the frequency and duration of hand-washing as much as is safely possible, given the current coronavirus pandemic; using a lukewarm, but not hot water and a mild soap, such as Dove; and frequent and aggressive moisturization, at least twice daily and immediately following hand-washing, with recommended over-the-counter moisturizing creams, such as Eucerin, Cetaphil, Cerave, or Aveeno; Vaseline or Aquaphor ointments; or Neutrogena Norwegian Formula Hand Cream.  The risks, benefits, and side effects of this medication, including possible skin atrophy with overuse of topical steroids, were discussed.  The patient expressed understanding and is in agreement with this plan.    Related Medications  triamcinolone (Kenalog) 0.1 % cream  Apply twice daily to affected areas of hands (avoid face, groin, body folds) for 2  weeks, then taper to twice daily on weekends only; repeat every 6-8 weeks as needed for flares    4. Melanocytic nevus of trunk  Scattered on the patient's face, neck, trunk, and extremities, there are multiple small, round- to oval-shaped, brown-pigmented and pink-colored, symmetric, uniform-appearing macules and dome-shaped papules    Clinically benign- to slightly atypical-appearing nevi - the clinically benign- to slightly atypical-appearing nature of the patient's nevi was discussed with the patient today.  None of the patient's nevi meet threshold for biopsy today.  I emphasized the importance of performing monthly self-skin exams using the ABCDs of monitoring moles, which were reviewed with the patient today and an informational hand-out provided.  I also emphasized the importance of sun avoidance and sun protection with daily sunscreen use.  The patient expressed understanding and is in agreement with this plan.    5. Seborrheic keratosis  Scattered on the patient's face, neck, trunk, and extremities, there are multiple tan- to light brown-colored, hyperkeratotic, stuck-on appearing papules of varying size and shape    Seborrheic Keratoses - the benign nature of these lesions was discussed with the patient today and reassurance provided.  No treatment is medically indicated for these lesions at this time.    6. Lentigo  Photodistributed  Multiple tan- to light brown-colored, round- to oval-shaped, symmetric and uniform-appearing macules and small patches consistent with lentigines scattered in sun-exposed areas.    Solar Lentigines and photodamage.  The clinically benign-appearing nature of these lesions and their relation to chronic sun exposure were discussed with the patient today and reassurance provided.  None of these lesions meet threshold for biopsy today, and thus no treatment is medically indicated for these lesions at this time.  The signs and symptoms of skin cancer were reviewed and the patient  was advised to practice sun protection and sun avoidance, use daily sunscreen, and perform regular self skin exams.  The patient was instructed to monitor these lesions for any changes, such as in size, shape, or color, or associated symptoms and to call our office to schedule a return visit for re-evaluation if any such changes or symptoms are noticed in the future.  The patient expressed understanding and is in agreement with this plan.    7. History of nonmelanoma skin cancer  On the patient's mid back and left distal dorsal forearm, there are well-healed scars with no evidence of recurrent growth on exam today.    History of nonmelanoma skin cancers and actinic keratoses and photodamage.  There is no evidence of recurrence at the sites of the patient's previously treated NMSCs on exam today.  The signs and symptoms of skin cancer were reviewed and the patient was advised to practice sun protection and sun avoidance, use daily sunscreen, and perform regular self skin exams.  I will have the patient return to our office in 6 to 12 months, pending the above biopsy result, for routine follow-up and skin exam, and the patient was instructed to call our office should the patient notice any new, changing, symptomatic, or otherwise concerning skin lesions before then.  The patient expressed understanding and is in agreement with this plan.    8. Diffuse photodamage of skin  Photodistributed  Diffuse photodamage with actinic changes with telangiectasia and mottled pigmentation in sun-exposed areas.    Photodamage.  The signs and symptoms of skin cancer were reviewed and the patient was advised to practice sun protection and sun avoidance, use daily sunscreen, and perform regular self skin exams.  Sun protection was discussed, including avoiding the mid-day sun, wearing a sunscreen with SPF at least 50, and stressing the need for reapplication of sunscreen and applying more than they think they need.

## 2023-11-24 LAB
CHOLEST SERPL-MCNC: 300 MG/DL (ref 100–199)
HDL SERPL-SCNC: 34.3 UMOL/L
HDLC SERPL-MCNC: 62 MG/DL
LDL SERPL QN: 21.4 NM
LDL SERPL-SCNC: 2393 NMOL/L
LDL SMALL SERPL-SCNC: 1054 NMOL/L
LDLC SERPL CALC-MCNC: 198 MG/DL (ref 0–99)
LP-IR SCORE SERPL: <25
TRIGL SERPL-MCNC: 212 MG/DL (ref 0–149)

## 2023-11-27 LAB
LABORATORY COMMENT REPORT: NORMAL
PATH REPORT.FINAL DX SPEC: NORMAL
PATH REPORT.GROSS SPEC: NORMAL
PATH REPORT.MICROSCOPIC SPEC OTHER STN: NORMAL
PATH REPORT.RELEVANT HX SPEC: NORMAL
PATH REPORT.TOTAL CANCER: NORMAL

## 2023-11-28 ENCOUNTER — TELEPHONE (OUTPATIENT)
Dept: CARDIOLOGY | Facility: CLINIC | Age: 53
End: 2023-11-28
Payer: COMMERCIAL

## 2023-11-28 DIAGNOSIS — E78.5 HYPERLIPIDEMIA, UNSPECIFIED HYPERLIPIDEMIA TYPE: Primary | ICD-10-CM

## 2023-11-28 RX ORDER — ROSUVASTATIN CALCIUM 20 MG/1
20 TABLET, COATED ORAL DAILY
Qty: 30 TABLET | Refills: 11 | Status: SHIPPED | OUTPATIENT
Start: 2023-11-28 | End: 2024-11-27

## 2023-12-05 ENCOUNTER — OFFICE VISIT (OUTPATIENT)
Dept: ORTHOPEDIC SURGERY | Facility: HOSPITAL | Age: 53
End: 2023-12-05
Payer: COMMERCIAL

## 2023-12-05 VITALS — BODY MASS INDEX: 21.53 KG/M2 | WEIGHT: 134 LBS | HEIGHT: 66 IN

## 2023-12-05 DIAGNOSIS — M25.512 CHRONIC PAIN OF BOTH SHOULDERS: ICD-10-CM

## 2023-12-05 DIAGNOSIS — M25.512 LEFT SHOULDER PAIN, UNSPECIFIED CHRONICITY: Primary | ICD-10-CM

## 2023-12-05 DIAGNOSIS — G89.29 CHRONIC PAIN OF BOTH SHOULDERS: ICD-10-CM

## 2023-12-05 DIAGNOSIS — M25.511 CHRONIC PAIN OF BOTH SHOULDERS: ICD-10-CM

## 2023-12-05 PROCEDURE — 99214 OFFICE O/P EST MOD 30 MIN: CPT | Performed by: NURSE PRACTITIONER

## 2023-12-05 PROCEDURE — 1036F TOBACCO NON-USER: CPT | Performed by: NURSE PRACTITIONER

## 2023-12-05 RX ORDER — DICLOFENAC SODIUM 10 MG/G
GEL TOPICAL
Qty: 350 G | Refills: 6 | Status: SHIPPED | OUTPATIENT
Start: 2023-12-05

## 2023-12-05 RX ORDER — LIDOCAINE 50 MG/G
1 PATCH TOPICAL DAILY
Qty: 90 PATCH | Refills: 0 | Status: SHIPPED | OUTPATIENT
Start: 2023-12-05 | End: 2024-03-04

## 2023-12-05 ASSESSMENT — PAIN - FUNCTIONAL ASSESSMENT: PAIN_FUNCTIONAL_ASSESSMENT: 0-10

## 2023-12-05 ASSESSMENT — PAIN DESCRIPTION - DESCRIPTORS: DESCRIPTORS: ACHING;SHARP

## 2023-12-05 ASSESSMENT — PAIN SCALES - GENERAL: PAINLEVEL_OUTOF10: 4
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
## 2023-12-06 ENCOUNTER — TELEPHONE (OUTPATIENT)
Dept: HEMATOLOGY/ONCOLOGY | Facility: CLINIC | Age: 53
End: 2023-12-06
Payer: COMMERCIAL

## 2023-12-06 DIAGNOSIS — G89.29 CHRONIC LEFT SHOULDER PAIN: ICD-10-CM

## 2023-12-06 DIAGNOSIS — M25.512 CHRONIC LEFT SHOULDER PAIN: ICD-10-CM

## 2023-12-06 RX ORDER — LIDOCAINE 50 MG/G
PATCH TOPICAL
Qty: 30 PATCH | Refills: 0 | Status: SHIPPED | OUTPATIENT
Start: 2023-12-06 | End: 2024-04-26

## 2023-12-06 NOTE — TELEPHONE ENCOUNTER
Left message for patient to call back regarding appt on 12/15. Dr. Olivera ok with patient having virtual visit if she would like or we can move appt to when she is back in town. Either way is fine. Whatever patient prefers.

## 2023-12-07 ENCOUNTER — TELEPHONE (OUTPATIENT)
Dept: PRIMARY CARE | Facility: CLINIC | Age: 53
End: 2023-12-07
Payer: COMMERCIAL

## 2023-12-07 NOTE — TELEPHONE ENCOUNTER
Pt called and asked for a refill of ativan  Is that something you are rx for her long term or was it just for the time being

## 2023-12-15 ENCOUNTER — TELEMEDICINE (OUTPATIENT)
Dept: HEMATOLOGY/ONCOLOGY | Facility: HOSPITAL | Age: 53
End: 2023-12-15
Payer: COMMERCIAL

## 2023-12-15 DIAGNOSIS — C50.412 MALIGNANT NEOPLASM OF UPPER-OUTER QUADRANT OF LEFT BREAST IN FEMALE, ESTROGEN RECEPTOR POSITIVE (MULTI): ICD-10-CM

## 2023-12-15 DIAGNOSIS — Z17.0 MALIGNANT NEOPLASM OF UPPER-OUTER QUADRANT OF LEFT BREAST IN FEMALE, ESTROGEN RECEPTOR POSITIVE (MULTI): ICD-10-CM

## 2023-12-15 PROCEDURE — 99214 OFFICE O/P EST MOD 30 MIN: CPT | Performed by: STUDENT IN AN ORGANIZED HEALTH CARE EDUCATION/TRAINING PROGRAM

## 2023-12-15 NOTE — PROGRESS NOTES
Patient ID: Ruba Woodard is a 53 y.o. female.    The patient presents to clinic today for her history of breast cancer.      Diagnostic/Therapeutic History:       Cancer History:          Breast         AJCC Edition: 8th (AJCC), Diagnosis Date: Dec 2022, IA, pT1b pN0 cM0 G1     Treatment Synopsis:    53 year female with newly diagnosed with recently diagnosed left breast invasive carcinoma  IDC grade 2, ER 95%, AR 70% her2 2+ but negative by dual RE left partial mastectomy with  SLNB  (0/2) c/w grade I IDC, with DCIS grade2 , low risk mammaprint low risk luminal A +0.505, stage as [id number]      On 10/13/2022: had a digital mammogram, screening that showed heterogenously dense breast tissue which may obscure small masses, left breat asymetry with right breast calcifications.  BIRADS category 0     On 10/28/2022: she had diagnostic mammogram that showed heterogenously dense breast, previously noted calcifications are grouped round, punctuate and indistinct calcifications in the inferolateral right breast  at anterior depth. within the left breast,  the previously noted asymmetry persists as an irregular focal asymmetry in the superolateral aspect of the posterior depth . On ultrasound at 1o'clock, 8 CFN  0.7x0.8x0.5cm mass irregular , left axilla US showed 3 unremarkable LN      On 11/08/2022: right breast calcification core needle biopsy  showed columnar cell change and hyperplasia with associated focal microcalcifications, left breast core needle biopsy at 1oclock, 10 CFN showed IDC grade 2, ER 95%, AR 70% her2 2+ but negative  by dual RE .with associated microcalcifcation with DICS, low nuclear grade, cribriform pattern with necrosis  and calcifications. biopsy again of her right breast calcifications that was negative     On 12/14/2022 underwent left partial mastectomy with SLNB  (0/2) c/w grade I IDC, with DCIS grade2 , low risk mammaprint low risk luminal A +0.505, stage as [id number]     S/P Radiation:       Location : Left breast.  Dates : 2023 through 2023.  Number of Treatments : 15  Dose : 40.05 Gray.  Modality : 6 and 10 MV photons.    History of Present Illness (HPI)/Interval History:    On letrozole; has elevated cholesterol level for which she is seeing her cardiologist. No joint or muscle pains, no fever, no chills. No abdominal pain, no nausea, no vomitting. Does have hot flushes     PMH: Hyperlipidemia, arthritis.      PSH: tonsilectomy  ankle surgery, knee surgery and an endometrial ablation.     Allergies: reviewed      SH:  Pharmacisit. accompanied by daughter      Reproductive hx:  Menarche age 15.  .   She is premenopausal.  OCP  for 10 years.  She denies any history of HRT     Review of Systems:  14-point ROS otherwise negative, as per HPI.    Past Medical History:   Diagnosis Date    Contact with and (suspected) exposure to potentially hazardous body fluids 2019    Accidental hypodermic needlestick injury with exposure to body fluid    Parageusia 2020    Loss of taste    Personal history of malignant neoplasm of breast 2022    History of malignant neoplasm of left breast    Personal history of other endocrine, nutritional and metabolic disease 10/07/2020    History of thyrotoxicosis       Past Surgical History:   Procedure Laterality Date    ADENOIDECTOMY  2013    Adenoidectomy    ANKLE ARTHROSCOPY W/ OPEN REPAIR  2013    Ankle Repair    BI MAMMO GUIDED LOCALIZATION BREAST RIGHT Right 2020    BI MAMMO GUIDED LOCALIZATION BREAST RIGHT 2020 [facility]    BREAST BIOPSY Right 2022    Benign right core biopsy    BREAST LUMPECTOMY Left 2022    left Lumpectomy with Radiation 22    OTHER SURGICAL HISTORY  2021    Tonsillectomy    OTHER SURGICAL HISTORY  2021    Knee surgery    OTHER SURGICAL HISTORY  2021    Bunionectomy    OTHER SURGICAL HISTORY  2021    Breast surgery    OTHER SURGICAL HISTORY   04/21/2021    Endometrial ablation    TONSILLECTOMY  11/12/2013    Tonsillectomy       Social History     Socioeconomic History    Marital status:      Spouse name: Not on file    Number of children: Not on file    Years of education: Not on file    Highest education level: Not on file   Occupational History    Not on file   Tobacco Use    Smoking status: Never    Smokeless tobacco: Never   Vaping Use    Vaping Use: Never used   Substance and Sexual Activity    Alcohol use: Yes    Drug use: Never    Sexual activity: Not on file   Other Topics Concern    Not on file   Social History Narrative    Not on file     Social Determinants of Health     Financial Resource Strain: Not on file   Food Insecurity: Not on file   Transportation Needs: Not on file   Physical Activity: Not on file   Stress: Not on file   Social Connections: Not on file   Intimate Partner Violence: Not on file   Housing Stability: Not on file       Allergies   Allergen Reactions    Hydrocodone Unknown    Scopolamine Other     Pain  stiffness    Morphine Other         Current Outpatient Medications:     acetaminophen (Tylenol) 500 mg tablet, Take 2 tablets (1,000 mg) by mouth every 6 hours if needed., Disp: , Rfl:     ascorbic acid, vitamin C, 500 mg capsule, Take 500 mg by mouth., Disp: , Rfl:     clindamycin (Cleocin T) 1 % lotion, Apply 1 Application topically., Disp: , Rfl:     cyclobenzaprine (Flexeril) 10 mg tablet, Take 1 tablet (10 mg) by mouth 2 times a day as needed for muscle spasms., Disp: 60 tablet, Rfl: 0    diclofenac sodium (Voltaren) 1 % gel gel, Apply sparingly to affected areas once daily, Disp: 350 g, Rfl: 6    fluocinonide 0.05 % cream, Apply 1 Application topically., Disp: , Rfl:     ibuprofen 200 mg tablet, Take 1 tablet (200 mg) by mouth., Disp: , Rfl:     letrozole (Femara) 2.5 mg tablet, Take 1 tablet (2.5 mg total) by mouth once daily.  Take with or without food., Disp: 360 tablet, Rfl: 0    lidocaine (Lidoderm) 5 %  patch, Place 1 patch over 12 hours on the skin once daily. Remove & discard patch within 12 hours or as directed by MD., Disp: 90 patch, Rfl: 0    lidocaine (Lidoderm) 5 % patch, USE AS DIRECTED ON PACKAGE, Disp: 30 patch, Rfl: 0    LORazepam (Ativan) 0.5 mg tablet, Take 1 tablet (0.5 mg) by mouth every 8 hours if needed for anxiety for up to 7 days., Disp: 21 tablet, Rfl: 0    multivitamin (Multiple Vitamins) tablet, Take by mouth., Disp: , Rfl:     ondansetron ODT (Zofran-ODT) 4 mg disintegrating tablet, Take 1 tablet (4 mg) by mouth every 6 hours if needed for nausea or vomiting., Disp: , Rfl:     rosuvastatin (Crestor) 20 mg tablet, Take 1 tablet (20 mg) by mouth once daily., Disp: 30 tablet, Rfl: 11    triamcinolone (Kenalog) 0.1 % cream, Apply 1 Application topically., Disp: , Rfl:     triamcinolone (Kenalog) 0.1 % cream, Apply twice daily to affected areas of hands (avoid face, groin, body folds) for 2 weeks, then taper to twice daily on weekends only; repeat every 6-8 weeks as needed for flares, Disp: 80 g, Rfl: 2     Objective    BSA: There is no height or weight on file to calculate BSA.  LMP  (LMP Unknown)     Physical Exam    Virtual visit so no PE was done    Laboratory Data:  Lab Results   Component Value Date    WBC 3.9 (L) 09/26/2023    HGB 12.3 09/26/2023    HCT 38.1 09/26/2023    MCV 91 09/26/2023     09/26/2023       Chemistry    Lab Results   Component Value Date/Time     09/26/2023 0924    K 4.7 09/26/2023 0924     09/26/2023 0924    CO2 26 09/26/2023 0924    BUN 18 09/26/2023 0924    CREATININE 0.81 09/26/2023 0924    Lab Results   Component Value Date/Time    CALCIUM 9.0 09/26/2023 0924    ALKPHOS 55 09/26/2023 0924    AST 19 09/26/2023 0924    ALT 10 09/26/2023 0924    BILITOT 0.5 09/26/2023 0941        Assessment/Plan:    53year female with newly diagnosed with recently diagnosed left breast invasive carcinoma  IDC grade 2, ER 95%, TN 70% her2 2+ but negative by dual RE  s/p  left partial mastectomy  with SLNB  (0/2) c/w grade I IDC, with DCIS grade2 , low risk mammaprint low risk luminal A +0.505, stage as [id number] presenting to discuss adjuvant treatment options.      I reviewed with her the events that led to her diagnosis of breast cancer. We reviewed all the procedures and diagnostic imaging she underwent thus far. I discussed the features of her breast cancer that include the size, grade, lymph node status and  hormone receptor/ her2-ashley status.     Ruba  was being considered for ANNELIESE trial NRG-. This study randomizes between radiation and physician chosen endocrine therapy or physician chosen endocrine therapy alone, she was eligible as oncotype was less 18 (15)      Ruba decided to proceed with radiation so she wont be enrolling in the ANNELIESE trial NRG-. Completed radiation. Started on adjuvant hormonal therapy. started Aromatase inhibitor with Ca/vit D. Does have elevated cholesterol, has follow up with her cardiologist.      - continue letrozole, Ca/vit D  - Referral to integrative Oncology    RTC in        At least 35 minutes of direct consultation was spent reviewing the patient's chart as well as discussing and  reviewing the  cancer care plan including educating and answering  questions and concerns, greater than 50 percent spent in counseling and coordination  of care.       No orders of the defined types were placed in this encounter.            Gomez Olivera MD  Hematology and Medical Oncology  [facility]

## 2023-12-21 ENCOUNTER — OFFICE VISIT (OUTPATIENT)
Dept: ORTHOPEDIC SURGERY | Facility: CLINIC | Age: 53
End: 2023-12-21
Payer: COMMERCIAL

## 2023-12-21 ENCOUNTER — OFFICE VISIT (OUTPATIENT)
Dept: PRIMARY CARE | Facility: CLINIC | Age: 53
End: 2023-12-21
Payer: COMMERCIAL

## 2023-12-21 VITALS
WEIGHT: 134 LBS | SYSTOLIC BLOOD PRESSURE: 120 MMHG | DIASTOLIC BLOOD PRESSURE: 70 MMHG | BODY MASS INDEX: 21.63 KG/M2 | HEART RATE: 72 BPM

## 2023-12-21 DIAGNOSIS — M17.0 PRIMARY OSTEOARTHRITIS OF BOTH KNEES: Primary | ICD-10-CM

## 2023-12-21 DIAGNOSIS — E78.01 FAMILIAL HYPERCHOLESTEROLEMIA: ICD-10-CM

## 2023-12-21 DIAGNOSIS — F43.10 PTSD (POST-TRAUMATIC STRESS DISORDER): Primary | ICD-10-CM

## 2023-12-21 DIAGNOSIS — H11.32 SUBCONJUNCTIVAL HEMORRHAGE, LEFT: ICD-10-CM

## 2023-12-21 PROCEDURE — 99214 OFFICE O/P EST MOD 30 MIN: CPT | Performed by: FAMILY MEDICINE

## 2023-12-21 PROCEDURE — 2500000004 HC RX 250 GENERAL PHARMACY W/ HCPCS (ALT 636 FOR OP/ED): Performed by: ORTHOPAEDIC SURGERY

## 2023-12-21 PROCEDURE — 2500000005 HC RX 250 GENERAL PHARMACY W/O HCPCS: Performed by: ORTHOPAEDIC SURGERY

## 2023-12-21 PROCEDURE — 20610 DRAIN/INJ JOINT/BURSA W/O US: CPT | Performed by: ORTHOPAEDIC SURGERY

## 2023-12-21 PROCEDURE — 20610 DRAIN/INJ JOINT/BURSA W/O US: CPT | Mod: 50 | Performed by: ORTHOPAEDIC SURGERY

## 2023-12-21 PROCEDURE — 1036F TOBACCO NON-USER: CPT | Performed by: FAMILY MEDICINE

## 2023-12-21 PROCEDURE — 1036F TOBACCO NON-USER: CPT | Performed by: ORTHOPAEDIC SURGERY

## 2023-12-21 RX ORDER — LIDOCAINE HYDROCHLORIDE 10 MG/ML
4 INJECTION INFILTRATION; PERINEURAL
Status: COMPLETED | OUTPATIENT
Start: 2023-12-21 | End: 2023-12-21

## 2023-12-21 RX ORDER — ESCITALOPRAM OXALATE 10 MG/1
TABLET ORAL
Qty: 25 TABLET | Refills: 0 | Status: SHIPPED | OUTPATIENT
Start: 2023-12-21 | End: 2024-01-11 | Stop reason: SDUPTHER

## 2023-12-21 RX ORDER — TRIAMCINOLONE ACETONIDE 40 MG/ML
1 INJECTION, SUSPENSION INTRA-ARTICULAR; INTRAMUSCULAR
Status: COMPLETED | OUTPATIENT
Start: 2023-12-21 | End: 2023-12-21

## 2023-12-21 RX ADMIN — TRIAMCINOLONE ACETONIDE 1 ML: 400 INJECTION, SUSPENSION INTRA-ARTICULAR; INTRAMUSCULAR at 10:02

## 2023-12-21 RX ADMIN — LIDOCAINE HYDROCHLORIDE 4 ML: 10 INJECTION, SOLUTION INFILTRATION; PERINEURAL at 10:02

## 2023-12-21 ASSESSMENT — PAIN - FUNCTIONAL ASSESSMENT: PAIN_FUNCTIONAL_ASSESSMENT: NO/DENIES PAIN
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
## 2023-12-21 NOTE — PROGRESS NOTES
L Inj/Asp: bilateral knee on 12/21/2023 10:02 AM  Indications: pain and joint swelling  Details: 22 G needle, anterolateral approach  Medications (Right): 1 mL triamcinolone acetonide 40 mg/mL; 4 mL lidocaine 10 mg/mL (1 %)  Medications (Left): 1 mL triamcinolone acetonide 40 mg/mL; 4 mL lidocaine 10 mg/mL (1 %)    Discussion:  I discussed the conservative treatment options for knee osteoarthritis including but not limited to physical therapy, oral NSAIDS, activity and lifestyle modification, and corticosteroid injections. Pt has elected to undergo a cortisone injection today. I have explained the risk and benefits of an injection including the possibility of joint infection, bleeding, damage to cartilage, allergic reaction. Patient verbalized understanding and gave verbal consent wishes to proceed with a intra-articular cortisone injection for their knee.    Procedure:  After discussing the risk and benefits of the procedure, we proceeded with an intra-articular bilateral knee injection.    With the patient's informed verbal consent, the bilateral knees were prepped in standard sterile fashion with Chlorhexidine. The skin was then anesthetized with ethyl chloride spray and cleaned again with Chlorhexidine. The bilateral knees were then apirated/injected with a prefilled 20-gauge syringe of 40 mg Kenalog + 4 ml Lidocaine using the lateral approach without complications.  The patient tolerated this well and felt immediate initial relief of symptoms. A bandaid was applied and the patient ambulated out of the clinic on ther own accord without difficulty. Patient was instructed to avoid physical activity for 24-48 hours to prevent the knees from swelling and may ice the knees as tolerated. Patient should contact the office if any signs of of infection appear: redness, fever, chills, drainage, swelling or warmth to the knees.  Pt understands that the injections can be repeated no sooner than 3 months.      Procedure,  treatment alternatives, risks and benefits explained, specific risks discussed. Consent was given by the patient. Immediately prior to procedure a time out was called to verify the correct patient, procedure, equipment, support staff and site/side marked as required. Patient was prepped and draped in the usual sterile fashion.

## 2023-12-21 NOTE — ASSESSMENT & PLAN NOTE
Reviewed CT cardiac score from 2013, with a score of 0  Reviewed NMR profile that was recently done with elevated small LDL particles  Suggested rosuvastatin appropriate therapy  May want to consider getting CT cardiac score again as recommended by Dr. Crowe

## 2023-12-21 NOTE — ASSESSMENT & PLAN NOTE
After discussion, we made a shared decision to start an SSRI, namely Lexapro 5 mg daily x 7 days and then 10 mg daily thereafter with the hope that you will communicate with me the efficacy and tolerability after about 3 to 4 weeks so that I can make adjustments accordingly    Risks, benefits, and options of treatment(s) were discussed after reviewing all current medication(s) and drug allergy(ies)  I opted for the treatment that we discussed with instructions on the medication use for your underlying medical ailment(s)

## 2023-12-28 ENCOUNTER — APPOINTMENT (OUTPATIENT)
Dept: RADIOLOGY | Facility: CLINIC | Age: 53
End: 2023-12-28
Payer: COMMERCIAL

## 2024-01-10 ENCOUNTER — ANCILLARY PROCEDURE (OUTPATIENT)
Dept: RADIOLOGY | Facility: CLINIC | Age: 54
End: 2024-01-10
Payer: COMMERCIAL

## 2024-01-10 DIAGNOSIS — E78.01 FAMILIAL HYPERCHOLESTEROLEMIA: ICD-10-CM

## 2024-01-10 DIAGNOSIS — E78.2 MIXED HYPERLIPIDEMIA: ICD-10-CM

## 2024-01-10 PROCEDURE — 75571 CT HRT W/O DYE W/CA TEST: CPT

## 2024-01-11 DIAGNOSIS — F43.10 PTSD (POST-TRAUMATIC STRESS DISORDER): ICD-10-CM

## 2024-01-11 RX ORDER — ESCITALOPRAM OXALATE 10 MG/1
10 TABLET ORAL DAILY
Qty: 90 TABLET | Refills: 1 | Status: SHIPPED | OUTPATIENT
Start: 2024-01-11 | End: 2024-07-09

## 2024-01-17 ENCOUNTER — LAB (OUTPATIENT)
Dept: LAB | Facility: LAB | Age: 54
End: 2024-01-17
Payer: COMMERCIAL

## 2024-01-17 DIAGNOSIS — E78.5 HYPERLIPIDEMIA, UNSPECIFIED HYPERLIPIDEMIA TYPE: ICD-10-CM

## 2024-01-17 LAB
CHOLEST SERPL-MCNC: 216 MG/DL (ref 0–199)
CHOLESTEROL/HDL RATIO: 2.4
HDLC SERPL-MCNC: 90.4 MG/DL
LDLC SERPL CALC-MCNC: 110 MG/DL
NON HDL CHOLESTEROL: 126 MG/DL (ref 0–149)
TRIGL SERPL-MCNC: 77 MG/DL (ref 0–149)
VLDL: 15 MG/DL (ref 0–40)

## 2024-01-17 PROCEDURE — 36415 COLL VENOUS BLD VENIPUNCTURE: CPT

## 2024-01-17 PROCEDURE — 80061 LIPID PANEL: CPT
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
## 2024-01-23 ENCOUNTER — OFFICE VISIT (OUTPATIENT)
Dept: ORTHOPEDIC SURGERY | Facility: HOSPITAL | Age: 54
End: 2024-01-23
Payer: COMMERCIAL

## 2024-01-23 VITALS — HEIGHT: 66 IN | BODY MASS INDEX: 21.53 KG/M2 | WEIGHT: 134 LBS

## 2024-01-23 DIAGNOSIS — M25.512 CHRONIC PAIN OF BOTH SHOULDERS: Primary | ICD-10-CM

## 2024-01-23 DIAGNOSIS — G89.29 CHRONIC PAIN OF BOTH SHOULDERS: Primary | ICD-10-CM

## 2024-01-23 DIAGNOSIS — M25.511 CHRONIC PAIN OF BOTH SHOULDERS: Primary | ICD-10-CM

## 2024-01-23 PROCEDURE — 99213 OFFICE O/P EST LOW 20 MIN: CPT | Performed by: ORTHOPAEDIC SURGERY

## 2024-01-23 PROCEDURE — 20610 DRAIN/INJ JOINT/BURSA W/O US: CPT | Performed by: ORTHOPAEDIC SURGERY

## 2024-01-23 PROCEDURE — 1036F TOBACCO NON-USER: CPT | Performed by: ORTHOPAEDIC SURGERY

## 2024-01-23 ASSESSMENT — PAIN DESCRIPTION - DESCRIPTORS: DESCRIPTORS: ACHING

## 2024-01-23 ASSESSMENT — PAIN SCALES - GENERAL: PAINLEVEL_OUTOF10: 4

## 2024-01-23 ASSESSMENT — PAIN - FUNCTIONAL ASSESSMENT: PAIN_FUNCTIONAL_ASSESSMENT: 0-10

## 2024-03-11 ENCOUNTER — TELEPHONE (OUTPATIENT)
Dept: HEMATOLOGY/ONCOLOGY | Facility: CLINIC | Age: 54
End: 2024-03-11

## 2024-03-15 ENCOUNTER — APPOINTMENT (OUTPATIENT)
Dept: HEMATOLOGY/ONCOLOGY | Facility: HOSPITAL | Age: 54
End: 2024-03-15
Payer: COMMERCIAL

## 2024-03-22 ENCOUNTER — OFFICE VISIT (OUTPATIENT)
Dept: ORTHOPEDIC SURGERY | Facility: CLINIC | Age: 54
End: 2024-03-22
Payer: COMMERCIAL

## 2024-03-22 DIAGNOSIS — M17.0 PRIMARY OSTEOARTHRITIS OF BOTH KNEES: ICD-10-CM

## 2024-03-22 PROCEDURE — 1036F TOBACCO NON-USER: CPT | Performed by: ORTHOPAEDIC SURGERY

## 2024-03-22 PROCEDURE — 2500000005 HC RX 250 GENERAL PHARMACY W/O HCPCS: Performed by: ORTHOPAEDIC SURGERY

## 2024-03-22 PROCEDURE — 20610 DRAIN/INJ JOINT/BURSA W/O US: CPT | Performed by: ORTHOPAEDIC SURGERY

## 2024-03-22 PROCEDURE — 2500000004 HC RX 250 GENERAL PHARMACY W/ HCPCS (ALT 636 FOR OP/ED): Performed by: ORTHOPAEDIC SURGERY

## 2024-03-22 RX ORDER — LIDOCAINE HYDROCHLORIDE 10 MG/ML
4 INJECTION INFILTRATION; PERINEURAL
Status: COMPLETED | OUTPATIENT
Start: 2024-03-22 | End: 2024-03-22

## 2024-03-22 RX ORDER — TRIAMCINOLONE ACETONIDE 40 MG/ML
1 INJECTION, SUSPENSION INTRA-ARTICULAR; INTRAMUSCULAR
Status: COMPLETED | OUTPATIENT
Start: 2024-03-22 | End: 2024-03-22

## 2024-03-22 RX ADMIN — TRIAMCINOLONE ACETONIDE 1 ML: 40 INJECTION, SUSPENSION INTRA-ARTICULAR; INTRAMUSCULAR at 13:23

## 2024-03-22 RX ADMIN — LIDOCAINE HYDROCHLORIDE 4 ML: 10 INJECTION, SOLUTION INFILTRATION; PERINEURAL at 13:23

## 2024-03-22 ASSESSMENT — PAIN - FUNCTIONAL ASSESSMENT: PAIN_FUNCTIONAL_ASSESSMENT: 0-10

## 2024-03-22 ASSESSMENT — PAIN DESCRIPTION - DESCRIPTORS: DESCRIPTORS: ACHING;SHARP

## 2024-03-22 ASSESSMENT — PAIN SCALES - GENERAL: PAINLEVEL_OUTOF10: 7

## 2024-03-22 NOTE — PROGRESS NOTES
L Inj/Asp: bilateral knee on 3/22/2024 1:23 PM  Indications: pain and joint swelling  Details: 22 G needle, anterolateral approach  Medications (Right): 1 mL triamcinolone acetonide 40 mg/mL; 4 mL lidocaine 10 mg/mL (1 %)  Medications (Left): 1 mL triamcinolone acetonide 40 mg/mL; 4 mL lidocaine 10 mg/mL (1 %)    Discussion:  I discussed the conservative treatment options for knee osteoarthritis including but not limited to physical therapy, oral NSAIDS, activity and lifestyle modification, and corticosteroid injections. Pt has elected to undergo a cortisone injection today. I have explained the risk and benefits of an injection including the possibility of joint infection, bleeding, damage to cartilage, allergic reaction. Patient verbalized understanding and gave verbal consent wishes to proceed with a intra-articular cortisone injection for their knee.    Procedure:  After discussing the risk and benefits of the procedure, we proceeded with an intra-articular bilateral knee injection.    With the patient's informed verbal consent, the bilateral knees were prepped in standard sterile fashion with Chlorhexidine. The skin was then anesthetized with ethyl chloride spray and cleaned again with Chlorhexidine. The bilateral knees were then apirated/injected with a prefilled 20-gauge syringe of 40 mg Kenalog + 4 ml Lidocaine using the lateral approach without complications.  The patient tolerated this well and felt immediate initial relief of symptoms. A bandaid was applied and the patient ambulated out of the clinic on ther own accord without difficulty. Patient was instructed to avoid physical activity for 24-48 hours to prevent the knees from swelling and may ice the knees as tolerated. Patient should contact the office if any signs of of infection appear: redness, fever, chills, drainage, swelling or warmth to the knees.  Pt understands that the injections can be repeated no sooner than 3 months.      Procedure,  treatment alternatives, risks and benefits explained, specific risks discussed. Consent was given by the patient. Immediately prior to procedure a time out was called to verify the correct patient, procedure, equipment, support staff and site/side marked as required. Patient was prepped and draped in the usual sterile fashion.

## 2024-04-02 ENCOUNTER — OFFICE VISIT (OUTPATIENT)
Dept: ORTHOPEDIC SURGERY | Facility: HOSPITAL | Age: 54
End: 2024-04-02
Payer: COMMERCIAL

## 2024-04-02 VITALS — BODY MASS INDEX: 20.89 KG/M2 | WEIGHT: 130 LBS | HEIGHT: 66 IN

## 2024-04-02 DIAGNOSIS — M75.122 COMPLETE TEAR OF LEFT ROTATOR CUFF, UNSPECIFIED WHETHER TRAUMATIC: Primary | ICD-10-CM

## 2024-04-02 DIAGNOSIS — M25.512 LEFT SHOULDER PAIN, UNSPECIFIED CHRONICITY: ICD-10-CM

## 2024-04-02 PROCEDURE — 2500000005 HC RX 250 GENERAL PHARMACY W/O HCPCS: Performed by: NURSE PRACTITIONER

## 2024-04-02 PROCEDURE — 99213 OFFICE O/P EST LOW 20 MIN: CPT | Performed by: NURSE PRACTITIONER

## 2024-04-02 PROCEDURE — 1036F TOBACCO NON-USER: CPT | Performed by: NURSE PRACTITIONER

## 2024-04-02 PROCEDURE — 20610 DRAIN/INJ JOINT/BURSA W/O US: CPT | Performed by: NURSE PRACTITIONER

## 2024-04-02 PROCEDURE — 2500000004 HC RX 250 GENERAL PHARMACY W/ HCPCS (ALT 636 FOR OP/ED): Performed by: NURSE PRACTITIONER

## 2024-04-02 ASSESSMENT — PAIN DESCRIPTION - DESCRIPTORS: DESCRIPTORS: ACHING

## 2024-04-02 ASSESSMENT — PAIN - FUNCTIONAL ASSESSMENT: PAIN_FUNCTIONAL_ASSESSMENT: 0-10

## 2024-04-02 ASSESSMENT — PAIN SCALES - GENERAL: PAINLEVEL_OUTOF10: 5 - MODERATE PAIN
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
## 2024-04-05 ENCOUNTER — APPOINTMENT (OUTPATIENT)
Dept: ORTHOPEDIC SURGERY | Facility: HOSPITAL | Age: 54
End: 2024-04-05
Payer: COMMERCIAL

## 2024-04-10 PROCEDURE — 2500000004 HC RX 250 GENERAL PHARMACY W/ HCPCS (ALT 636 FOR OP/ED): Performed by: NURSE PRACTITIONER

## 2024-04-10 PROCEDURE — 2500000005 HC RX 250 GENERAL PHARMACY W/O HCPCS: Performed by: NURSE PRACTITIONER

## 2024-04-10 RX ORDER — LIDOCAINE HYDROCHLORIDE 10 MG/ML
4 INJECTION INFILTRATION; PERINEURAL
Status: COMPLETED | OUTPATIENT
Start: 2024-04-10 | End: 2024-04-10

## 2024-04-10 RX ORDER — TRIAMCINOLONE ACETONIDE 40 MG/ML
40 INJECTION, SUSPENSION INTRA-ARTICULAR; INTRAMUSCULAR
Status: COMPLETED | OUTPATIENT
Start: 2024-04-10 | End: 2024-04-10

## 2024-04-10 RX ADMIN — TRIAMCINOLONE ACETONIDE 40 MG: 400 INJECTION, SUSPENSION INTRA-ARTICULAR; INTRAMUSCULAR at 23:24

## 2024-04-10 RX ADMIN — LIDOCAINE HYDROCHLORIDE 4 ML: 10 INJECTION, SOLUTION INFILTRATION; PERINEURAL at 23:24

## 2024-04-11 ENCOUNTER — OFFICE VISIT (OUTPATIENT)
Dept: OBSTETRICS AND GYNECOLOGY | Facility: CLINIC | Age: 54
End: 2024-04-11
Payer: COMMERCIAL

## 2024-04-11 VITALS
DIASTOLIC BLOOD PRESSURE: 74 MMHG | WEIGHT: 135 LBS | HEIGHT: 66 IN | SYSTOLIC BLOOD PRESSURE: 122 MMHG | BODY MASS INDEX: 21.69 KG/M2

## 2024-04-11 DIAGNOSIS — Z01.419 WELL WOMAN EXAM WITH ROUTINE GYNECOLOGICAL EXAM: ICD-10-CM

## 2024-04-11 PROCEDURE — 88175 CYTOPATH C/V AUTO FLUID REDO: CPT

## 2024-04-11 PROCEDURE — 1036F TOBACCO NON-USER: CPT | Performed by: OBSTETRICS & GYNECOLOGY

## 2024-04-11 PROCEDURE — 99396 PREV VISIT EST AGE 40-64: CPT | Performed by: OBSTETRICS & GYNECOLOGY

## 2024-04-11 NOTE — PROGRESS NOTES
Subjective   Patient ID: Ruba Woodard is a 53 y.o. female who presents for Well Women Visit (Annual exam with pap smear/- mammograms with Dr Chavez//No complaints//Chaperone declined).  HPI  As contained in the chief complaint  Review of Systems  10 systems have been reviewed and are negative and noncontributory to the patient current ailments.    Objective   Physical Exam  Vital signs reviewed    CONSTITUTIONAL- well nourished, well developed, looks like stated age.  She is sitting comfortably on the exam table in no apparent distress  SKIN- normal skin color and pigmentation no visible lesions  EYES- normal external exam  THYROID- symmetrical ,normal size and normal consistency  HEART- RRR without murmur, S3 or S4.  LUNGS- breathing comfortably, no dyspnea  EXTREMITIES- no deformities  NEUROLOGICAL- oriented and no focal signs.  Cranial nerves II through XII intact  PSYCHIATRIC- alert, pleasant and cordial, age-appropriate, not anxious, or depressed appearing  BREASTS-normal appearance, no skin changes or nipple discharge.  Palpation of the breast and axillae: No palpable mass and no axillary lymphadenopathy  ABDOMEN- soft, non distended, bowel sound normal pitch and intensity,no palpable abnormal masses  GENITOURINARY- External genitalia: Normal.                                 - Uterus: AV/AF NSSC, mobile and nontender                                -The adnexal areas are free of tenderness or mass                                -There are no cervical lesions; there is no cervical motion tenderness                                -Vagina without lesions, mucosa pink and well-hydrated, discharge is physiologic.                                   Inspection of Perianal Area: Normal    Assessment/Plan   Diagnoses and all orders for this visit:  Well woman exam with routine gynecological exam  -     THINPREP PAP TEST           Black Briseno DO 04/11/24 10:23 AM

## 2024-04-19 LAB
CYTOLOGY CMNT CVX/VAG CYTO-IMP: NORMAL
LAB AP HPV GENOTYPE QUESTION: YES
LAB AP HPV HR: NORMAL
LABORATORY COMMENT REPORT: NORMAL
MENSTRUAL HX REPORTED: NORMAL
PATH REPORT.TOTAL CANCER: NORMAL

## 2024-04-26 DIAGNOSIS — G89.29 CHRONIC LEFT SHOULDER PAIN: ICD-10-CM

## 2024-04-26 DIAGNOSIS — M25.512 CHRONIC LEFT SHOULDER PAIN: ICD-10-CM

## 2024-04-26 RX ORDER — LIDOCAINE 50 MG/G
PATCH TOPICAL
Qty: 90 PATCH | Refills: 0 | Status: SHIPPED | OUTPATIENT
Start: 2024-04-26

## 2024-04-29 ENCOUNTER — TELEPHONE (OUTPATIENT)
Dept: RADIATION ONCOLOGY | Facility: HOSPITAL | Age: 54
End: 2024-04-29
Payer: COMMERCIAL

## 2024-04-29 NOTE — TELEPHONE ENCOUNTER
Called pt to remind of appointment on 4/30/24 at 10:00 Pt confirmed appointment.     Nehemiah Griffin MA

## 2024-04-30 ENCOUNTER — APPOINTMENT (OUTPATIENT)
Dept: RADIATION ONCOLOGY | Facility: HOSPITAL | Age: 54
End: 2024-04-30
Payer: COMMERCIAL

## 2024-04-30 ENCOUNTER — HOSPITAL ENCOUNTER (OUTPATIENT)
Dept: RADIATION ONCOLOGY | Facility: HOSPITAL | Age: 54
Setting detail: RADIATION/ONCOLOGY SERIES
Discharge: HOME | End: 2024-04-30
Payer: COMMERCIAL

## 2024-04-30 VITALS
TEMPERATURE: 97.5 F | WEIGHT: 137.5 LBS | HEIGHT: 66 IN | SYSTOLIC BLOOD PRESSURE: 122 MMHG | BODY MASS INDEX: 22.1 KG/M2 | RESPIRATION RATE: 18 BRPM | OXYGEN SATURATION: 100 % | DIASTOLIC BLOOD PRESSURE: 77 MMHG | HEART RATE: 63 BPM

## 2024-04-30 DIAGNOSIS — C50.412 MALIGNANT NEOPLASM OF UPPER-OUTER QUADRANT OF LEFT BREAST IN FEMALE, ESTROGEN RECEPTOR POSITIVE (MULTI): ICD-10-CM

## 2024-04-30 DIAGNOSIS — Z79.811 AROMATASE INHIBITOR USE: Primary | ICD-10-CM

## 2024-04-30 DIAGNOSIS — Z17.0 MALIGNANT NEOPLASM OF UPPER-OUTER QUADRANT OF LEFT BREAST IN FEMALE, ESTROGEN RECEPTOR POSITIVE (MULTI): ICD-10-CM

## 2024-04-30 PROCEDURE — 99213 OFFICE O/P EST LOW 20 MIN: CPT | Performed by: NURSE PRACTITIONER

## 2024-04-30 ASSESSMENT — ENCOUNTER SYMPTOMS
OCCASIONAL FEELINGS OF UNSTEADINESS: 0
DEPRESSION: 0
LOSS OF SENSATION IN FEET: 0

## 2024-04-30 ASSESSMENT — COLUMBIA-SUICIDE SEVERITY RATING SCALE - C-SSRS
2. HAVE YOU ACTUALLY HAD ANY THOUGHTS OF KILLING YOURSELF?: NO
6. HAVE YOU EVER DONE ANYTHING, STARTED TO DO ANYTHING, OR PREPARED TO DO ANYTHING TO END YOUR LIFE?: NO
1. IN THE PAST MONTH, HAVE YOU WISHED YOU WERE DEAD OR WISHED YOU COULD GO TO SLEEP AND NOT WAKE UP?: NO

## 2024-04-30 ASSESSMENT — PATIENT HEALTH QUESTIONNAIRE - PHQ9
SUM OF ALL RESPONSES TO PHQ9 QUESTIONS 1 AND 2: 0
2. FEELING DOWN, DEPRESSED OR HOPELESS: NOT AT ALL
1. LITTLE INTEREST OR PLEASURE IN DOING THINGS: NOT AT ALL

## 2024-04-30 ASSESSMENT — PAIN SCALES - GENERAL: PAINLEVEL: 0-NO PAIN

## 2024-04-30 NOTE — PROGRESS NOTES
Radiation Oncology Follow-Up    Patient Name:  Ruba Woodard  MRN:  [id number]  :  1970    Referring Provider: Blaire Weinstein, APR*  Primary Care Provider: Gregg Gonzalez DO  Care Team: Patient Care Team:  Gregg Gonzalez DO as PCP - General (Family Medicine)  Gomez Olivera MD as Consulting Physician (Hematology and Oncology)    Date of Service: 2024          AJCC Edition: 8th (AJCC), Diagnosis Date: Dec 2022, IA, pT1b pN0 cM0 G1     Treatment Synopsis:    54 year female with left breast invasive carcinoma  IDC grade 2, ER 95%, DE 70% her2 2+ but negative by dual RE  left partial mastectomy with SLNB  (0/2) c/w grade I IDC, with DCIS grade2 , low risk mammaprint low risk luminal A +0.505, stage as [id number].     Medical oncology: Dr. Gomez Olivera  Radiation oncology: Dr. Gaye Eagle  Surgical oncology:  Dr. Nely Chavez     Treatment Summary:      On 10/13/2022: had a digital mammogram, screening that showed heterogenously dense breast tissue which may obscure small masses, left breat asymetry with right breast calcifications.  BIRADS category 0     On 10/28/2022: she had diagnostic mammogram that showed heterogenously dense breast, previously noted calcifications are grouped round, punctuate and indistinct calcifications in the inferolateral right breast  at anterior depth. within the left breast,  the previously noted asymmetry persists as an irregular focal asymmetry in the superolateral aspect of the posterior depth . On ultrasound at 1o'clock, 8 CFN  0.7x0.8x0.5cm mass irregular , left axilla US showed 3 unremarkable LN      On 2022: right breast calcification core needle biopsy  showed columnar cell change and hyperplasia with associated focal microcalcifications, left breast core needle biopsy at 1oclock, 10 CFN showed IDC grade 2, ER 95%, DE 70% her2 2+ but negative  by dual RE .with associated microcalcifcation with DICS, low nuclear grade, cribriform pattern with necrosis  and  calcifications. biopsy again of her right breast calcifications that was negative     On 12/14/2022 underwent left partial mastectomy with SLNB  (0/2) c/w grade I IDC, with DCIS grade2 , low risk mammaprint low risk luminal A +0.505, stage as [id number]     2/8/23 - 2/28/23: Radiation to the left breast consisting of a dose of 40.05 Gy given in 15 fractions of 2.67 Gy per fraction. ABC was utilized for cardiac sparing.      Adjuvant endocrine therapy with aromatase inhibitor     SUBJECTIVE  History of Present Illness:   Ruba Woodard is here today for routine radiation follow up/surveillance visit.  She is doing very well and reports left breast well healed post treatment. She had minimal skin changes and skin is intact. No swelling of left arm or difficulty with ROM.  She continues letrozole and says the only adverse effect is elevation in her lipids. She is now on Crestor. Bone density is normal.   Denies headaches, fever, chills, cough, SOB, chest  pain, GI or  complaints, or bony pain. She does have arthritis in her knees and shoulders. She is active and works as a pharmacist.      Review of Systems:    Review of Systems   All other systems reviewed and are negative.    Performance Status:   The Karnofsky performance scale today is 100, Fully active, able to carry on all pre-disease performed without restriction (ECOG equivalent 0).      OBJECTIVE    Current Outpatient Medications:     acetaminophen (Tylenol) 500 mg tablet, Take 2 tablets (1,000 mg) by mouth every 6 hours if needed., Disp: , Rfl:     ascorbic acid, vitamin C, 500 mg capsule, Take 500 mg by mouth., Disp: , Rfl:     clindamycin (Cleocin T) 1 % lotion, Apply 1 Application topically., Disp: , Rfl:     diclofenac sodium (Voltaren) 1 % gel gel, Apply sparingly to affected areas once daily, Disp: 350 g, Rfl: 6    escitalopram (Lexapro) 10 mg tablet, Take 1 tablet (10 mg) by mouth once daily., Disp: 90 tablet, Rfl: 1    ibuprofen 200 mg tablet, Take 1  tablet (200 mg) by mouth., Disp: , Rfl:     letrozole (Femara) 2.5 mg tablet, Take 1 tablet (2.5 mg total) by mouth once daily.  Take with or without food., Disp: 360 tablet, Rfl: 0    lidocaine (Lidoderm) 5 % patch, PLACE 1 PATCH OVER EVERY 12 HOURS ON THE SKIN ONCE  DAILY. REMOVE AND DISCARD PATCH WITHIN 12 HOURS OR AS DIRECTED BY MD, Disp: 90 patch, Rfl: 0    multivitamin (Multiple Vitamins) tablet, Take by mouth., Disp: , Rfl:     ondansetron ODT (Zofran-ODT) 4 mg disintegrating tablet, Take 1 tablet (4 mg) by mouth every 6 hours if needed for nausea or vomiting., Disp: , Rfl:     rosuvastatin (Crestor) 20 mg tablet, Take 1 tablet (20 mg) by mouth once daily., Disp: 30 tablet, Rfl: 11     Physical Exam  Constitutional:       Appearance: Normal appearance.   HENT:      Head: Normocephalic and atraumatic.      Nose: Nose normal.      Mouth/Throat:      Pharynx: Oropharynx is clear.   Eyes:      Extraocular Movements: Extraocular movements intact.      Pupils: Pupils are equal, round, and reactive to light.   Cardiovascular:      Pulses: Normal pulses.      Heart sounds: Normal heart sounds. No murmur heard.     No gallop.   Pulmonary:      Effort: Pulmonary effort is normal.      Breath sounds: Normal breath sounds.   Chest:   Breasts:     Right: Normal. No swelling, inverted nipple, mass or nipple discharge.      Left: Normal. No swelling, inverted nipple, mass or nipple discharge.      Comments: Left breast with well healed surgical incision.   Abdominal:      Palpations: Abdomen is soft.   Musculoskeletal:         General: No swelling. Normal range of motion.      Cervical back: Normal range of motion and neck supple.   Lymphadenopathy:      Cervical: No cervical adenopathy.   Skin:     General: Skin is warm and dry.   Neurological:      General: No focal deficit present.      Mental Status: She is alert and oriented to person, place, and time.   Psychiatric:         Mood and Affect: Mood normal.          Behavior: Behavior normal.         RESULTS:  Narrative & Impression   Interpreted By:  PEDRO BALDWIN MD and COLT PHILLIPS MD  MRN: [id number]  Patient Name: MAGNO HI     STUDY:  DIGITAL DIAG MAMM LEFT W/CHINEDU UNILAT;  6/16/2023 8:54 am     ACCESSION NUMBER(S):  [id number]     ORDERING CLINICIAN:  ANABELLA TOSCANO     INDICATION:  Left breast cancer status post lumpectomy and axillary lymph node  dissection with radiation therapy in December 2022.     COMPARISON:  11/21/2022, 10/13/2022, 10/12/2021, and 10/05/2020.     FINDINGS:  2D and tomosynthesis images were reviewed at 1 mm slice thickness.     The breast tissue is heterogeneously dense, which may obscure small  masses.  Interval lumpectomy changes consisting of architectural  distortion, surgical clips and overlying skin retraction, of the  superolateral left breast at posterior depth. There are also  partially visualized postsurgical changes of the axilla. Mild skin  thickening and trabeculation is noted, consistent with post radiation  changes. No suspicious masses or calcifications are identified in the  left breast.     IMPRESSION:  No mammographic evidence of malignancy in the left breast. Post  treatment changes of the left breast and axilla. The patient is due  for bilateral annual screening mammography in November 2023.     BI-RADS CATEGORY:     Category: 2 - Benign.  Recommendation: Continued annual screening mammography, next due  November 2023.     Narrative   Interpreted By:  MATT PATINO MD  MRN: [id number]  Patient Name: MAGNO HI     STUDY:  BONE DENSITY, DEXA 1 OR MORE SITES: AXIAL SKELETN9/18/2023 10:43 am     INDICATION:  Breast cancer, on aromatast inhibitor.  C50.412: Carcinoma of  upper-outer quadrant of left female breast. The patient is a 54 y/o  year old F.     COMPARISON:  None.     ACCESSION NUMBER(S):  [id number]     ORDERING CLINICIAN:  TARUN BARROS     TECHNIQUE:  BONE DENSITY, DEXA 1 OR MORE SITES: AXIAL SKELETN     FINDINGS:  SPINE  L1-L4  Bone Mineral Density: 1.084  T-Score -0.8  Z-Score 0.2  Classification:  Not reported  Bone Mineral Density change vs baseline:  Not reported  Bone Mineral Density change vs previous: Not reported     LEFT FEMUR -TOTAL  Bone Mineral Density: 0.974  T-Score -0.3   Z-Score  0.5  Classification:  Not reported  Bone Mineral Density change vs baseline: Not reported  Bone Mineral Density change vs previous: Not reported     LEFT FEMUR -NECK  Bone Mineral Density: 0.941  T-Score -0.7  Z-Score 0.4  Classification:  Not reported        World Health Organization (WHO) criteria for post-menopausal,   Women:  Normal:         T-score at or above -1 SD  Osteopenia:   T-score between -1 and -2.5 SD  Osteoporosis: T-score at or below -2.5 SD        10-year Fracture Risk:  Major Osteoporotic Fracture  9.2  Hip Fracture                        0.4     Note:  If no FRAX score is reported, it is because:  Some T-score for Spine Total or Hip Total or Femoral Neck at or below  -2.5         Impression   DEXA:  According to World Health Organization criteria,  classification is  normal.     Followup recommended in 2 years or sooner as clinically warranted.     ASSESSMENT/PLAN:  54 y.o. female with early stage left breast cancer s/p breast conserving surgery followed by radiation.  Doing well, and cosmesis is excellent.   She will continue letrozole and follow up routinely with  med onc and will schedule her mammogram with Dr. Chavez.  Radiation follow up in 12 mo.  Call  us with any questions or concerns.     Nandini Weinstein CNP  [phone]

## 2024-05-07 ENCOUNTER — APPOINTMENT (OUTPATIENT)
Dept: ORTHOPEDIC SURGERY | Facility: HOSPITAL | Age: 54
End: 2024-05-07
Payer: COMMERCIAL

## 2024-05-14 ENCOUNTER — TELEPHONE (OUTPATIENT)
Dept: ORTHOPEDIC SURGERY | Facility: HOSPITAL | Age: 54
End: 2024-05-14
Payer: COMMERCIAL

## 2024-05-14 NOTE — TELEPHONE ENCOUNTER
Spoke with Ruba, discussed updating March 22 visit charges which would lead to insurance interpreting visit correctly. Emailed Dr. Mcdermott for review.

## 2024-06-20 ENCOUNTER — APPOINTMENT (OUTPATIENT)
Dept: ORTHOPEDIC SURGERY | Facility: CLINIC | Age: 54
End: 2024-06-20
Payer: COMMERCIAL

## 2024-06-25 ENCOUNTER — APPOINTMENT (OUTPATIENT)
Dept: ORTHOPEDIC SURGERY | Facility: HOSPITAL | Age: 54
End: 2024-06-25
Payer: COMMERCIAL

## 2024-06-28 ENCOUNTER — APPOINTMENT (OUTPATIENT)
Dept: ORTHOPEDIC SURGERY | Facility: CLINIC | Age: 54
End: 2024-06-28
Payer: COMMERCIAL

## 2024-06-28 ENCOUNTER — HOSPITAL ENCOUNTER (OUTPATIENT)
Dept: RADIOLOGY | Facility: CLINIC | Age: 54
Discharge: HOME | End: 2024-06-28
Payer: COMMERCIAL

## 2024-06-28 DIAGNOSIS — M25.511 RIGHT SHOULDER PAIN, UNSPECIFIED CHRONICITY: ICD-10-CM

## 2024-06-28 DIAGNOSIS — M75.122 COMPLETE TEAR OF LEFT ROTATOR CUFF, UNSPECIFIED WHETHER TRAUMATIC: Primary | ICD-10-CM

## 2024-06-28 PROCEDURE — 1036F TOBACCO NON-USER: CPT | Performed by: NURSE PRACTITIONER

## 2024-06-28 PROCEDURE — 73030 X-RAY EXAM OF SHOULDER: CPT | Mod: RT

## 2024-06-28 PROCEDURE — 99214 OFFICE O/P EST MOD 30 MIN: CPT | Performed by: NURSE PRACTITIONER
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
## 2024-07-10 PROCEDURE — 20610 DRAIN/INJ JOINT/BURSA W/O US: CPT | Performed by: NURSE PRACTITIONER

## 2024-07-10 RX ORDER — TRIAMCINOLONE ACETONIDE 40 MG/ML
40 INJECTION, SUSPENSION INTRA-ARTICULAR; INTRAMUSCULAR
Status: COMPLETED | OUTPATIENT
Start: 2024-07-10 | End: 2024-07-10

## 2024-07-10 RX ORDER — LIDOCAINE HYDROCHLORIDE 10 MG/ML
4 INJECTION INFILTRATION; PERINEURAL
Status: COMPLETED | OUTPATIENT
Start: 2024-07-10 | End: 2024-07-10

## 2024-07-17 ENCOUNTER — APPOINTMENT (OUTPATIENT)
Dept: ORTHOPEDIC SURGERY | Facility: CLINIC | Age: 54
End: 2024-07-17
Payer: COMMERCIAL

## 2024-07-17 ENCOUNTER — TELEPHONE (OUTPATIENT)
Dept: CARDIOLOGY | Facility: CLINIC | Age: 54
End: 2024-07-17

## 2024-07-17 VITALS — BODY MASS INDEX: 22.02 KG/M2 | WEIGHT: 137 LBS | HEIGHT: 66 IN

## 2024-07-17 DIAGNOSIS — Z00.00 ANNUAL PHYSICAL EXAM: ICD-10-CM

## 2024-07-17 DIAGNOSIS — E78.2 MIXED HYPERLIPIDEMIA: Primary | ICD-10-CM

## 2024-07-17 DIAGNOSIS — Z11.4 ENCOUNTER FOR SCREENING FOR HIV: ICD-10-CM

## 2024-07-17 DIAGNOSIS — Z11.59 NEED FOR HEPATITIS C SCREENING TEST: ICD-10-CM

## 2024-07-17 DIAGNOSIS — M17.0 PRIMARY OSTEOARTHRITIS OF BOTH KNEES: Primary | ICD-10-CM

## 2024-07-17 PROCEDURE — 20610 DRAIN/INJ JOINT/BURSA W/O US: CPT | Performed by: ORTHOPAEDIC SURGERY

## 2024-07-17 PROCEDURE — 3008F BODY MASS INDEX DOCD: CPT | Performed by: ORTHOPAEDIC SURGERY

## 2024-07-17 PROCEDURE — 1036F TOBACCO NON-USER: CPT | Performed by: ORTHOPAEDIC SURGERY

## 2024-07-17 RX ORDER — ATORVASTATIN CALCIUM 40 MG/1
40 TABLET, FILM COATED ORAL DAILY
Qty: 30 TABLET | Refills: 1 | Status: SHIPPED | OUTPATIENT
Start: 2024-07-17

## 2024-07-17 RX ORDER — LIDOCAINE HYDROCHLORIDE 10 MG/ML
4 INJECTION INFILTRATION; PERINEURAL
Status: COMPLETED | OUTPATIENT
Start: 2024-07-17 | End: 2024-07-17

## 2024-07-17 RX ORDER — TRIAMCINOLONE ACETONIDE 40 MG/ML
1 INJECTION, SUSPENSION INTRA-ARTICULAR; INTRAMUSCULAR
Status: COMPLETED | OUTPATIENT
Start: 2024-07-17 | End: 2024-07-17

## 2024-07-17 ASSESSMENT — PAIN SCALES - GENERAL: PAINLEVEL_OUTOF10: 5 - MODERATE PAIN

## 2024-07-17 ASSESSMENT — PAIN - FUNCTIONAL ASSESSMENT: PAIN_FUNCTIONAL_ASSESSMENT: 0-10

## 2024-07-17 NOTE — PROGRESS NOTES
L Inj/Asp: bilateral knee on 7/17/2024 1:21 PM  Indications: pain and joint swelling  Details: 22 G needle, anterolateral approach  Medications (Right): 1 mL triamcinolone acetonide 40 mg/mL; 4 mL lidocaine 10 mg/mL (1 %)  Medications (Left): 1 mL triamcinolone acetonide 40 mg/mL; 4 mL lidocaine 10 mg/mL (1 %)  Outcome: tolerated well, no immediate complications    Discussion:  I discussed the conservative treatment options for knee osteoarthritis including but not limited to physical therapy, oral NSAIDS, activity and lifestyle modification, and corticosteroid injections. Pt has elected to undergo a cortisone injection today. I have explained the risk and benefits of an injection including the possibility of joint infection, bleeding, damage to cartilage, allergic reaction. Patient verbalized understanding and gave verbal consent wishes to proceed with a intra-articular cortisone injection for their knee.    Procedure:  After discussing the risk and benefits of the procedure, we proceeded with an intra-articular bilateral knee injection.    With the patient's informed verbal consent, the bilateral knees were prepped in standard sterile fashion with Chlorhexidine. The skin was then anesthetized with ethyl chloride spray and cleaned again with Chlorhexidine. The bilateral knees were then apirated/injected with a prefilled 20-gauge syringe of 40 mg Kenalog + 4 ml Lidocaine using the lateral approach without complications.  The patient tolerated this well and felt immediate initial relief of symptoms. A bandaid was applied and the patient ambulated out of the clinic on ther own accord without difficulty. Patient was instructed to avoid physical activity for 24-48 hours to prevent the knees from swelling and may ice the knees as tolerated. Patient should contact the office if any signs of of infection appear: redness, fever, chills, drainage, swelling or warmth to the knees.  Pt understands that the injections can be  repeated no sooner than 3 months.      Procedure, treatment alternatives, risks and benefits explained, specific risks discussed. Consent was given by the patient. Immediately prior to procedure a time out was called to verify the correct patient, procedure, equipment, support staff and site/side marked as required. Patient was prepped and draped in the usual sterile fashion.

## 2024-07-22 ENCOUNTER — APPOINTMENT (OUTPATIENT)
Dept: PRIMARY CARE | Facility: CLINIC | Age: 54
End: 2024-07-22
Payer: COMMERCIAL

## 2024-07-22 VITALS
HEIGHT: 66 IN | DIASTOLIC BLOOD PRESSURE: 78 MMHG | WEIGHT: 138 LBS | BODY MASS INDEX: 22.18 KG/M2 | HEART RATE: 76 BPM | SYSTOLIC BLOOD PRESSURE: 120 MMHG

## 2024-07-22 DIAGNOSIS — E78.01 FAMILIAL HYPERCHOLESTEROLEMIA: ICD-10-CM

## 2024-07-22 DIAGNOSIS — F43.10 PTSD (POST-TRAUMATIC STRESS DISORDER): ICD-10-CM

## 2024-07-22 DIAGNOSIS — Z17.0 MALIGNANT NEOPLASM OF UPPER-OUTER QUADRANT OF LEFT BREAST IN FEMALE, ESTROGEN RECEPTOR POSITIVE (MULTI): ICD-10-CM

## 2024-07-22 DIAGNOSIS — C50.412 MALIGNANT NEOPLASM OF UPPER-OUTER QUADRANT OF LEFT BREAST IN FEMALE, ESTROGEN RECEPTOR POSITIVE (MULTI): ICD-10-CM

## 2024-07-22 DIAGNOSIS — T45.1X5A HOT FLASHES RELATED TO AROMATASE INHIBITOR THERAPY: ICD-10-CM

## 2024-07-22 DIAGNOSIS — R23.2 HOT FLASHES RELATED TO AROMATASE INHIBITOR THERAPY: ICD-10-CM

## 2024-07-22 DIAGNOSIS — E03.9 ACQUIRED HYPOTHYROIDISM: ICD-10-CM

## 2024-07-22 DIAGNOSIS — L81.9 HYPERPIGMENTATION OF SKIN: ICD-10-CM

## 2024-07-22 DIAGNOSIS — Z00.00 PHYSICAL EXAM, ANNUAL: Primary | ICD-10-CM

## 2024-07-22 PROBLEM — M25.512 SHOULDER PAIN, LEFT: Status: RESOLVED | Noted: 2023-09-03 | Resolved: 2024-07-22

## 2024-07-22 PROBLEM — S40.022A CONTUSION OF LEFT UPPER ARM: Status: RESOLVED | Noted: 2023-09-12 | Resolved: 2024-07-22

## 2024-07-22 PROBLEM — H11.32 SUBCONJUNCTIVAL HEMORRHAGE, LEFT: Status: RESOLVED | Noted: 2023-12-21 | Resolved: 2024-07-22

## 2024-07-22 PROBLEM — M25.561 BILATERAL KNEE PAIN: Status: RESOLVED | Noted: 2023-09-03 | Resolved: 2024-07-22

## 2024-07-22 PROBLEM — M25.562 BILATERAL KNEE PAIN: Status: RESOLVED | Noted: 2023-09-03 | Resolved: 2024-07-22

## 2024-07-22 PROBLEM — R51.9 HEADACHE: Status: RESOLVED | Noted: 2023-09-26 | Resolved: 2024-07-22

## 2024-07-22 PROBLEM — S80.812A ABRASION OF ANTERIOR LEFT LOWER LEG: Status: RESOLVED | Noted: 2023-09-12 | Resolved: 2024-07-22

## 2024-07-22 PROBLEM — S60.512A ABRASION OF LEFT HAND: Status: RESOLVED | Noted: 2023-09-03 | Resolved: 2024-07-22

## 2024-07-22 PROBLEM — S63.502A SPRAIN OF LEFT WRIST: Status: RESOLVED | Noted: 2023-09-03 | Resolved: 2024-07-22

## 2024-07-22 PROBLEM — S46.211A STRAIN OF RIGHT BICEPS: Status: RESOLVED | Noted: 2023-09-03 | Resolved: 2024-07-22
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
## 2024-07-22 LAB
POC APPEARANCE, URINE: CLEAR
POC BILIRUBIN, URINE: NEGATIVE
POC BLOOD, URINE: NEGATIVE
POC COLOR, URINE: YELLOW
POC GLUCOSE, URINE: NEGATIVE MG/DL
POC KETONES, URINE: NEGATIVE MG/DL
POC LEUKOCYTES, URINE: NEGATIVE
POC NITRITE,URINE: NEGATIVE
POC PH, URINE: 6 PH
POC PROTEIN, URINE: NEGATIVE MG/DL
POC SPECIFIC GRAVITY, URINE: 1.02
POC UROBILINOGEN, URINE: 0.2 EU/DL

## 2024-07-22 PROCEDURE — 93000 ELECTROCARDIOGRAM COMPLETE: CPT | Performed by: FAMILY MEDICINE

## 2024-07-22 PROCEDURE — 81002 URINALYSIS NONAUTO W/O SCOPE: CPT | Performed by: FAMILY MEDICINE

## 2024-07-22 PROCEDURE — 1036F TOBACCO NON-USER: CPT | Performed by: FAMILY MEDICINE

## 2024-07-22 PROCEDURE — 3008F BODY MASS INDEX DOCD: CPT | Performed by: FAMILY MEDICINE

## 2024-07-22 PROCEDURE — 99396 PREV VISIT EST AGE 40-64: CPT | Performed by: FAMILY MEDICINE

## 2024-07-22 RX ORDER — ESCITALOPRAM OXALATE 10 MG/1
10 TABLET ORAL DAILY
Qty: 90 TABLET | Refills: 1 | Status: SHIPPED | OUTPATIENT
Start: 2024-07-22 | End: 2025-01-18

## 2024-07-22 NOTE — ASSESSMENT & PLAN NOTE
We will repeat your thyroid panel at this time. If your TSH is still elevated, we can consider initiating therapy.

## 2024-07-22 NOTE — ASSESSMENT & PLAN NOTE
A referral to dermatologist has been placed. They office will be calling you to schedule an appointment.

## 2024-07-22 NOTE — ASSESSMENT & PLAN NOTE
Please continue to follow-up with the specialist per protocol. Your mammogram is scheduled for November of this year.
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
## 2024-07-22 NOTE — ASSESSMENT & PLAN NOTE
Complete history and physical examination was performed  EKG reveals sinus rhythm without acute changes  We will notify of test results once available and make treatment recommendations accordingly

## 2024-09-10 ENCOUNTER — TELEMEDICINE (OUTPATIENT)
Dept: HEMATOLOGY/ONCOLOGY | Facility: CLINIC | Age: 54
End: 2024-09-10
Payer: COMMERCIAL

## 2024-09-10 ENCOUNTER — OFFICE VISIT (OUTPATIENT)
Dept: PRIMARY CARE | Facility: CLINIC | Age: 54
End: 2024-09-10
Payer: COMMERCIAL

## 2024-09-10 VITALS
HEART RATE: 66 BPM | DIASTOLIC BLOOD PRESSURE: 80 MMHG | BODY MASS INDEX: 22.34 KG/M2 | WEIGHT: 139 LBS | HEIGHT: 66 IN | SYSTOLIC BLOOD PRESSURE: 130 MMHG

## 2024-09-10 DIAGNOSIS — G89.29 CHRONIC LEFT SHOULDER PAIN: ICD-10-CM

## 2024-09-10 DIAGNOSIS — M75.92 LEFT SUPRASPINATUS TENDINITIS: ICD-10-CM

## 2024-09-10 DIAGNOSIS — M25.512 CHRONIC LEFT SHOULDER PAIN: ICD-10-CM

## 2024-09-10 DIAGNOSIS — M75.22 BICIPITAL TENDINITIS, LEFT: Primary | ICD-10-CM

## 2024-09-10 DIAGNOSIS — Z17.0 MALIGNANT NEOPLASM OF UPPER-OUTER QUADRANT OF LEFT BREAST IN FEMALE, ESTROGEN RECEPTOR POSITIVE (MULTI): ICD-10-CM

## 2024-09-10 DIAGNOSIS — F43.10 PTSD (POST-TRAUMATIC STRESS DISORDER): ICD-10-CM

## 2024-09-10 DIAGNOSIS — S46.012A TRAUMATIC COMPLETE TEAR OF LEFT ROTATOR CUFF, INITIAL ENCOUNTER: ICD-10-CM

## 2024-09-10 DIAGNOSIS — Z79.899 MEDICATION MANAGEMENT: ICD-10-CM

## 2024-09-10 DIAGNOSIS — C50.919 MALIGNANT NEOPLASM OF FEMALE BREAST, UNSPECIFIED ESTROGEN RECEPTOR STATUS, UNSPECIFIED LATERALITY, UNSPECIFIED SITE OF BREAST (MULTI): ICD-10-CM

## 2024-09-10 DIAGNOSIS — C50.412 MALIGNANT NEOPLASM OF UPPER-OUTER QUADRANT OF LEFT BREAST IN FEMALE, ESTROGEN RECEPTOR POSITIVE (MULTI): ICD-10-CM

## 2024-09-10 PROCEDURE — 80307 DRUG TEST PRSMV CHEM ANLYZR: CPT

## 2024-09-10 PROCEDURE — 3008F BODY MASS INDEX DOCD: CPT | Performed by: FAMILY MEDICINE

## 2024-09-10 PROCEDURE — 1036F TOBACCO NON-USER: CPT | Performed by: STUDENT IN AN ORGANIZED HEALTH CARE EDUCATION/TRAINING PROGRAM

## 2024-09-10 PROCEDURE — 80365 DRUG SCREENING OXYCODONE: CPT

## 2024-09-10 PROCEDURE — 80361 OPIATES 1 OR MORE: CPT

## 2024-09-10 PROCEDURE — 99214 OFFICE O/P EST MOD 30 MIN: CPT | Performed by: STUDENT IN AN ORGANIZED HEALTH CARE EDUCATION/TRAINING PROGRAM

## 2024-09-10 PROCEDURE — 1036F TOBACCO NON-USER: CPT | Performed by: FAMILY MEDICINE

## 2024-09-10 PROCEDURE — 99214 OFFICE O/P EST MOD 30 MIN: CPT | Performed by: FAMILY MEDICINE

## 2024-09-10 RX ORDER — OXYCODONE AND ACETAMINOPHEN 5; 325 MG/1; MG/1
1 TABLET ORAL EVERY 8 HOURS PRN
Qty: 30 TABLET | Refills: 0 | Status: SHIPPED | OUTPATIENT
Start: 2024-09-10 | End: 2024-09-20

## 2024-09-10 RX ORDER — LETROZOLE 2.5 MG/1
2.5 TABLET, FILM COATED ORAL DAILY
Qty: 90 TABLET | Refills: 2 | Status: SHIPPED | OUTPATIENT
Start: 2024-09-10 | End: 2025-09-10

## 2024-09-10 ASSESSMENT — PATIENT HEALTH QUESTIONNAIRE - PHQ9
2. FEELING DOWN, DEPRESSED OR HOPELESS: NOT AT ALL
1. LITTLE INTEREST OR PLEASURE IN DOING THINGS: NOT AT ALL
SUM OF ALL RESPONSES TO PHQ9 QUESTIONS 1 AND 2: 0

## 2024-09-10 NOTE — PROGRESS NOTES
Patient ID: Ruba Woodard is a 54 y.o. female.    The patient presents to clinic today for her history of breast cancer.      Diagnostic/Therapeutic History:       Cancer History:          Breast         AJCC Edition: 8th (AJCC), Diagnosis Date: Dec 2022, IA, pT1b pN0 cM0 G1     Treatment Synopsis:    53 year female with newly diagnosed with recently diagnosed left breast invasive carcinoma  IDC grade 2, ER 95%, IA 70% her2 2+ but negative by dual RE left partial mastectomy with  SLNB  (0/2) c/w grade I IDC, with DCIS grade2 , low risk mammaprint low risk luminal A +0.505, stage as [id number]      On 10/13/2022: had a digital mammogram, screening that showed heterogenously dense breast tissue which may obscure small masses, left breat asymetry with right breast calcifications.  BIRADS category 0     On 10/28/2022: she had diagnostic mammogram that showed heterogenously dense breast, previously noted calcifications are grouped round, punctuate and indistinct calcifications in the inferolateral right breast  at anterior depth. within the left breast,  the previously noted asymmetry persists as an irregular focal asymmetry in the superolateral aspect of the posterior depth . On ultrasound at 1o'clock, 8 CFN  0.7x0.8x0.5cm mass irregular , left axilla US showed 3 unremarkable LN      On 11/08/2022: right breast calcification core needle biopsy  showed columnar cell change and hyperplasia with associated focal microcalcifications, left breast core needle biopsy at 1oclock, 10 CFN showed IDC grade 2, ER 95%, IA 70% her2 2+ but negative  by dual RE .with associated microcalcifcation with DICS, low nuclear grade, cribriform pattern with necrosis  and calcifications. biopsy again of her right breast calcifications that was negative     On 12/14/2022 underwent left partial mastectomy with SLNB  (0/2) c/w grade I IDC, with DCIS grade2 , low risk mammaprint low risk luminal A +0.505, stage as [id number]     S/P Radiation:       Location : Left breast.  Dates : 2023 through 2023.  Number of Treatments : 15  Dose : 40.05 Gray.  Modality : 6 and 10 MV photons.    History of Present Illness (HPI)/Interval History:    Doing okay- has upcoming shoulder surgery     On letrozole; has elevated cholesterol level for which she is seeing her cardiologist. No joint or muscle pains, no fever, no chills. No abdominal pain, no nausea, no vomitting.     PMH: Hyperlipidemia, arthritis.      PSH: tonsilectomy  ankle surgery, knee surgery and an endometrial ablation.     Allergies: reviewed      SH:  Pharmacisit. accompanied by daughter      Reproductive hx:  Menarche age 15.  .  OCP  for 10 years.  She denies any history of HRT     Review of Systems:  14-point ROS otherwise negative, as per HPI.    Past Medical History:   Diagnosis Date    Contact with and (suspected) exposure to potentially hazardous body fluids 2019    Accidental hypodermic needlestick injury with exposure to body fluid    Parageusia 2020    Loss of taste    Personal history of malignant neoplasm of breast 2022    History of malignant neoplasm of left breast    Personal history of other endocrine, nutritional and metabolic disease 10/07/2020    History of thyrotoxicosis       Past Surgical History:   Procedure Laterality Date    ADENOIDECTOMY  2013    Adenoidectomy    ANKLE ARTHROSCOPY W/ OPEN REPAIR  2013    Ankle Repair    BI MAMMO GUIDED BREAST RIGHT LOCALIZATION Right 2020    BI MAMMO GUIDED LOCALIZATION BREAST RIGHT 2020 [facility]    BREAST BIOPSY Right 2022    Benign right core biopsy    BREAST LUMPECTOMY Left 2022    left Lumpectomy with Radiation 22    OTHER SURGICAL HISTORY  2021    Tonsillectomy    OTHER SURGICAL HISTORY  2021    Knee surgery    OTHER SURGICAL HISTORY  2021    Bunionectomy    OTHER SURGICAL HISTORY  2021    Breast surgery    OTHER SURGICAL HISTORY   04/21/2021    Endometrial ablation    TONSILLECTOMY  11/12/2013    Tonsillectomy       Social History     Socioeconomic History    Marital status:    Tobacco Use    Smoking status: Never     Passive exposure: Never    Smokeless tobacco: Never   Vaping Use    Vaping status: Never Used   Substance and Sexual Activity    Alcohol use: Yes    Drug use: Never    Sexual activity: Yes     Partners: Male     Birth control/protection: Post-menopausal       Allergies   Allergen Reactions    Hydrocodone Unknown    Scopolamine Other     Pain  stiffness    Morphine Other         Current Outpatient Medications:     acetaminophen (Tylenol) 500 mg tablet, Take 2 tablets (1,000 mg) by mouth every 6 hours if needed., Disp: , Rfl:     ascorbic acid, vitamin C, 500 mg capsule, Take 500 mg by mouth., Disp: , Rfl:     atorvastatin (Lipitor) 40 mg tablet, Take 1 tablet (40 mg) by mouth once daily., Disp: 30 tablet, Rfl: 1    clindamycin (Cleocin T) 1 % lotion, Apply 1 Application topically., Disp: , Rfl:     diclofenac sodium (Voltaren) 1 % gel gel, Apply sparingly to affected areas once daily, Disp: 350 g, Rfl: 6    escitalopram (Lexapro) 10 mg tablet, Take 1 tablet (10 mg) by mouth once daily., Disp: 90 tablet, Rfl: 1    ibuprofen 200 mg tablet, Take 1 tablet (200 mg) by mouth., Disp: , Rfl:     letrozole (Femara) 2.5 mg tablet, Take 1 tablet (2.5 mg total) by mouth once daily.  Take with or without food., Disp: 360 tablet, Rfl: 0    lidocaine (Lidoderm) 5 % patch, PLACE 1 PATCH OVER EVERY 12 HOURS ON THE SKIN ONCE  DAILY. REMOVE AND DISCARD PATCH WITHIN 12 HOURS OR AS DIRECTED BY MD, Disp: 90 patch, Rfl: 0    multivitamin (Multiple Vitamins) tablet, Take by mouth., Disp: , Rfl:     ondansetron ODT (Zofran-ODT) 4 mg disintegrating tablet, Take 1 tablet (4 mg) by mouth every 6 hours if needed for nausea or vomiting., Disp: , Rfl:     oxyCODONE-acetaminophen (Percocet) 5-325 mg tablet, Take 1 tablet by mouth every 8 hours if needed  for severe pain (7 - 10) for up to 10 days., Disp: 30 tablet, Rfl: 0    rosuvastatin (Crestor) 20 mg tablet, Take 1 tablet (20 mg) by mouth once daily., Disp: 30 tablet, Rfl: 11     Objective    BSA: There is no height or weight on file to calculate BSA.  There were no vitals taken for this visit.    Physical Exam    Virtual visit so no PE was done    Laboratory Data:  Lab Results   Component Value Date    WBC 3.9 (L) 09/26/2023    HGB 12.3 09/26/2023    HCT 38.1 09/26/2023    MCV 91 09/26/2023     09/26/2023       Chemistry    Lab Results   Component Value Date/Time     09/26/2023 0924    K 4.7 09/26/2023 0924     09/26/2023 0924    CO2 26 09/26/2023 0924    BUN 18 09/26/2023 0924    CREATININE 0.81 09/26/2023 0924    Lab Results   Component Value Date/Time    CALCIUM 9.0 09/26/2023 0924    ALKPHOS 55 09/26/2023 0924    AST 19 09/26/2023 0924    ALT 10 09/26/2023 0924    BILITOT 0.5 09/26/2023 0924        Assessment/Plan:    53year female with newly diagnosed with recently diagnosed left breast invasive carcinoma  IDC grade 2, ER 95%, [place] 70% her2 2+ but negative by dual RE s/p  left partial mastectomy  with SLNB  (0/2) c/w grade I IDC, with DCIS grade2 , low risk mammaprint low risk luminal A +0.505, stage as [id number] presenting to discuss adjuvant treatment options.      I reviewed with her the events that led to her diagnosis of breast cancer. We reviewed all the procedures and diagnostic imaging she underwent thus far. I discussed the features of her breast cancer that include the size, grade, lymph node status and  hormone receptor/ her2-ashley status.     Ruba  was being considered for ANNELIESE trial NRG-. This study randomizes between radiation and physician chosen endocrine therapy or physician chosen endocrine therapy alone, she was eligible as oncotype was less 18 (15)      Ruba decided to proceed with radiation so she wont be enrolling in the ANNELIESE trial NRG-. Completed radiation.  Started on adjuvant hormonal therapy. started Aromatase inhibitor with Ca/vit D. Does have elevated cholesterol, has follow up with her cardiologist.      Endmetrial ablation with LMP > 1 year ago  FSH 52, estradiol <19    - continue letrozole, Ca/vit D  -11/1/2024: Diagnostic mammogram and fup with Dr Rock MENA in      At least 35 minutes of direct consultation was spent reviewing the patient's chart as well as discussing and  reviewing the  cancer care plan including educating and answering  questions and concerns, greater than 50 percent spent in counseling and coordination  of care.       No orders of the defined types were placed in this encounter.            Gomez Olivera MD  Hematology and Medical Oncology  [facility]

## 2024-09-10 NOTE — ASSESSMENT & PLAN NOTE
We will obtain a controlled substance agreement along with urine drug screen today  We will send over Percocet, 30 tablets, hoping to get you some pain relief until your surgery  Risks, benefits, and options of treatment(s) were discussed after reviewing all current medication(s) and drug allergy(ies)  I opted for the treatment that we discussed with instructions on the medication use for your underlying medical ailment(s)

## 2024-09-10 NOTE — PROGRESS NOTES
Subjective   Patient ID: Ruba Woodard is a 54 y.o. female who presents for Shoulder Pain (Left shoulder since her accident in 2021).    Past Medical, Surgical, and Family History reviewed and updated in chart.    Reviewed all medications by prescribing practitioner or clinical pharmacist (such as prescriptions, OTCs, herbal therapies and supplements) and documented in the medical record.    HPI  1.  Left shoulder pain.  Ruba presents with severe and chronic/recurrent left shoulder pain. The plan is for left shoulder arthroscopic decompression, debridement, rotator cuff repair, with possible biceps tenodesis.    She has been attempting to schedule this procedure with Dr. White for some time, but approval from [facility] had been delayed. It was recently approved, and now a surgery date needs to be set.    Ruba is in tears today due to the pain and is requesting medication. On June 28, she was prescribed Percocet, 30 tablets, which lasted until now. She has contacted her surgeon, who is evidently not willing to provide pain medication, so she is here seeking relief as her activities of daily living are significantly affected. She has tried NSAIDs, muscle relaxants, and extra strength Tylenol without any benefit.    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 She uses lidocaine patches on her neck, shoulder, and wrist throughout the day for some pain relief.    2.  PTSD.  Lexapro has been significantly beneficial for her. Although her daily stressors persist, she feels better equipped to manage them. She reports a decrease in libido with the medication but wishes to continue the current dose of Lexapro at 10 mg daily, as it greatly helps with her overall emotional stability.    Review of Systems  All pertinent positive symptoms are included in the history of present illness.    All other systems have  been reviewed and are negative and noncontributory to this patient's current ailments.    Past Medical History:   Diagnosis Date    Contact with and (suspected) exposure to potentially hazardous body fluids 12/19/2019    Accidental hypodermic needlestick injury with exposure to body fluid    Parageusia 07/16/2020    Loss of taste    Personal history of malignant neoplasm of breast 12/16/2022    History of malignant neoplasm of left breast    Personal history of other endocrine, nutritional and metabolic disease 10/07/2020    History of thyrotoxicosis     Past Surgical History:   Procedure Laterality Date    ADENOIDECTOMY  11/12/2013    Adenoidectomy    ANKLE ARTHROSCOPY W/ OPEN REPAIR  11/12/2013    Ankle Repair    BI MAMMO GUIDED BREAST RIGHT LOCALIZATION Right 02/20/2020    BI MAMMO GUIDED LOCALIZATION BREAST RIGHT 2/20/2020 [facility]    BREAST BIOPSY Right 11/21/2022    Benign right core biopsy    BREAST LUMPECTOMY Left 12/14/2022    left Lumpectomy with Radiation 12/14/22    OTHER SURGICAL HISTORY  04/21/2021    Tonsillectomy    OTHER SURGICAL HISTORY  04/21/2021    Knee surgery    OTHER SURGICAL HISTORY  04/21/2021    Bunionectomy    OTHER SURGICAL HISTORY  04/21/2021    Breast surgery    OTHER SURGICAL HISTORY  04/21/2021    Endometrial ablation    TONSILLECTOMY  11/12/2013    Tonsillectomy     Social History     Tobacco Use    Smoking status: Never     Passive exposure: Never    Smokeless tobacco: Never   Vaping Use    Vaping status: Never Used   Substance Use Topics    Alcohol use: Yes    Drug use: Never     Family History   Problem Relation Name Age of Onset    Breast cancer Mother      Multiple sclerosis Mother      Prostate cancer Paternal Grandfather      Colon cancer Other Grandfather     Other (Colon cancer) Other Spouse     Hyperlipidemia Other Family hx      Immunization History   Administered Date(s) Administered    Flu vaccine (IIV4), preservative free *Check age/dose* 09/19/2016,  "12/20/2017, 09/19/2018, 09/29/2021    Flu vaccine, quadrivalent, recombinant, preservative free, adult (FLUBLOK) 11/03/2019, 09/28/2020    Influenza, seasonal, injectable 11/12/2013    Pfizer Purple Cap SARS-CoV-2 12/23/2021    Pneumococcal Conjugate PCV 7 1968    Tdap vaccine, age 7 year and older (BOOSTRIX, ADACEL) 01/22/2020     Current Outpatient Medications   Medication Instructions    acetaminophen (Tylenol) 500 mg tablet 2 tablets, oral, Every 6 hours PRN    ascorbic acid (vitamin C) 500 mg, oral    atorvastatin (LIPITOR) 40 mg, oral, Daily    clindamycin (Cleocin T) 1 % lotion 1 Application, Topical    diclofenac sodium (Voltaren) 1 % gel gel Apply sparingly to affected areas once daily    escitalopram (LEXAPRO) 10 mg, oral, Daily    ibuprofen 200 mg, oral    letrozole (FEMARA) 2.5 mg, oral, Daily, Take with or without food.    lidocaine (Lidoderm) 5 % patch PLACE 1 PATCH OVER EVERY 12 HOURS ON THE SKIN ONCE  DAILY. REMOVE AND DISCARD PATCH WITHIN 12 HOURS OR AS DIRECTED BY MD    multivitamin (Multiple Vitamins) tablet oral    ondansetron ODT (Zofran-ODT) 4 mg disintegrating tablet 1 tablet, oral, Every 6 hours PRN    oxyCODONE-acetaminophen (Percocet) 5-325 mg tablet 1 tablet, oral, Every 8 hours PRN    rosuvastatin (CRESTOR) 20 mg, oral, Daily     Allergies   Allergen Reactions    Hydrocodone Unknown    Scopolamine Other     Pain  stiffness    Morphine Other     Objective   Vitals:    09/10/24 0904   BP: 130/80   Pulse: 66   Weight: 63 kg (139 lb)   Height: 1.676 m (5' 6\")     Body mass index is 22.44 kg/m².    BP Readings from Last 3 Encounters:   09/10/24 130/80   07/22/24 120/78   04/30/24 122/77      Wt Readings from Last 3 Encounters:   09/10/24 63 kg (139 lb)   07/22/24 62.6 kg (138 lb)   07/17/24 62.1 kg (137 lb)        No visits with results within 1 Month(s) from this visit.   Latest known visit with results is:   Office Visit on 07/22/2024   Component Date Value    POC Color, Urine " 07/22/2024 Yellow     POC Appearance, Urine 07/22/2024 Clear     POC Glucose, Urine 07/22/2024 NEGATIVE     POC Bilirubin, Urine 07/22/2024 NEGATIVE     POC Ketones, Urine 07/22/2024 NEGATIVE     POC Specific Gravity, Ur* 07/22/2024 1.020     POC Blood, Urine 07/22/2024 NEGATIVE     POC PH, Urine 07/22/2024 6.0     POC Protein, Urine 07/22/2024 NEGATIVE     POC Urobilinogen, Urine 07/22/2024 0.2     Poc Nitrite, Urine 07/22/2024 NEGATIVE     POC Leukocytes, Urine 07/22/2024 NEGATIVE      Physical Exam  CONSTITUTIONAL - well nourished, well developed, looks like stated age, in no acute distress, not ill-appearing, and not tired appearing  SKIN - normal skin color and pigmentation  EYES - normal external exam  LUNGS - breathing comfortably, no dyspnea  EXTREMITIES - no deformities noticeable; left shoulder with decreased range of motion, significant reproducible tenderness around the joint capsule  NEUROLOGICAL - oriented and no focal signs  PSYCHIATRIC - alert, pleasant and cordial, age-appropriate, tearful in office at times discussing her pain level    Assessment/Plan   Problem List Items Addressed This Visit       Bicipital tendinitis, left - Primary     Continue to follow with orthopedic surgery per protocol         Relevant Medications    oxyCODONE-acetaminophen (Percocet) 5-325 mg tablet    Left supraspinatus tendinitis     Continue to follow with orthopedic surgery per protocol         Relevant Medications    oxyCODONE-acetaminophen (Percocet) 5-325 mg tablet    Rotator cuff tear     Please schedule your left shoulder surgery at your earliest mutual convenience so that this concern of years can be addressed and finally taken care of         Relevant Medications    oxyCODONE-acetaminophen (Percocet) 5-325 mg tablet    PTSD (post-traumatic stress disorder)     I happened here that the use of Lexapro has provided significant benefit in regard to your emotional lability  Please continue medication at current dose,  and if you feel the dose needs to be increased, please let us know so we can take care of that         Chronic left shoulder pain     We will obtain a controlled substance agreement along with urine drug screen today  We will send over Percocet, 30 tablets, hoping to get you some pain relief until your surgery  Risks, benefits, and options of treatment(s) were discussed after reviewing all current medication(s) and drug allergy(ies)  I opted for the treatment that we discussed with instructions on the medication use for your underlying medical ailment(s)         Relevant Medications    oxyCODONE-acetaminophen (Percocet) 5-325 mg tablet     Other Visit Diagnoses       Medication management        Relevant Orders    Drug Screen, Urine With Reflex to Confirmation

## 2024-09-10 NOTE — ASSESSMENT & PLAN NOTE
I happened here that the use of Lexapro has provided significant benefit in regard to your emotional lability  Please continue medication at current dose, and if you feel the dose needs to be increased, please let us know so we can take care of that

## 2024-09-10 NOTE — ASSESSMENT & PLAN NOTE
Please schedule your left shoulder surgery at your earliest mutual convenience so that this concern of years can be addressed and finally taken care of

## 2024-09-11 ENCOUNTER — APPOINTMENT (OUTPATIENT)
Dept: HEMATOLOGY/ONCOLOGY | Facility: CLINIC | Age: 54
End: 2024-09-11
Payer: COMMERCIAL

## 2024-09-11 LAB
AMPHETAMINES UR QL SCN: ABNORMAL
BARBITURATES UR QL SCN: ABNORMAL
BENZODIAZ UR QL SCN: ABNORMAL
BZE UR QL SCN: ABNORMAL
CANNABINOIDS UR QL SCN: ABNORMAL
FENTANYL+NORFENTANYL UR QL SCN: ABNORMAL
METHADONE UR QL SCN: ABNORMAL
OPIATES UR QL SCN: ABNORMAL
OXYCODONE+OXYMORPHONE UR QL SCN: ABNORMAL
PCP UR QL SCN: ABNORMAL

## 2024-09-16 LAB
6MAM UR CFM-MCNC: <25 NG/ML
CODEINE UR CFM-MCNC: <50 NG/ML
HYDROCODONE CTO UR CFM-MCNC: <25 NG/ML
HYDROMORPHONE UR CFM-MCNC: <25 NG/ML
MORPHINE UR CFM-MCNC: <50 NG/ML
NORHYDROCODONE UR CFM-MCNC: <25 NG/ML
NOROXYCODONE UR CFM-MCNC: 93 NG/ML
OXYCODONE UR CFM-MCNC: <25 NG/ML
OXYMORPHONE UR CFM-MCNC: 172 NG/ML

## 2024-09-24 ENCOUNTER — APPOINTMENT (OUTPATIENT)
Dept: DERMATOLOGY | Facility: CLINIC | Age: 54
End: 2024-09-24
Payer: COMMERCIAL

## 2024-09-30 ENCOUNTER — TELEPHONE (OUTPATIENT)
Dept: PRIMARY CARE | Facility: CLINIC | Age: 54
End: 2024-09-30
Payer: COMMERCIAL

## 2024-10-03 DIAGNOSIS — M62.838 MUSCLE SPASMS OF NECK: ICD-10-CM

## 2024-10-03 DIAGNOSIS — M25.512 CHRONIC LEFT SHOULDER PAIN: ICD-10-CM

## 2024-10-03 DIAGNOSIS — M62.838 CERVICAL PARASPINOUS MUSCLE SPASM: ICD-10-CM

## 2024-10-03 DIAGNOSIS — G89.29 CHRONIC LEFT SHOULDER PAIN: ICD-10-CM

## 2024-10-07 ENCOUNTER — LAB (OUTPATIENT)
Dept: LAB | Facility: LAB | Age: 54
End: 2024-10-07
Payer: COMMERCIAL

## 2024-10-07 DIAGNOSIS — Z11.4 ENCOUNTER FOR SCREENING FOR HIV: ICD-10-CM

## 2024-10-07 DIAGNOSIS — E78.2 MIXED HYPERLIPIDEMIA: ICD-10-CM

## 2024-10-07 DIAGNOSIS — Z00.00 ANNUAL PHYSICAL EXAM: ICD-10-CM

## 2024-10-07 DIAGNOSIS — Z11.59 NEED FOR HEPATITIS C SCREENING TEST: ICD-10-CM

## 2024-10-07 LAB
ALBUMIN SERPL BCP-MCNC: 4.2 G/DL (ref 3.4–5)
ALP SERPL-CCNC: 66 U/L (ref 33–110)
ALT SERPL W P-5'-P-CCNC: 14 U/L (ref 7–45)
ANION GAP SERPL CALC-SCNC: 13 MMOL/L (ref 10–20)
AST SERPL W P-5'-P-CCNC: 23 U/L (ref 9–39)
BASOPHILS # BLD AUTO: 0.01 X10*3/UL (ref 0–0.1)
BASOPHILS NFR BLD AUTO: 0.3 %
BILIRUB DIRECT SERPL-MCNC: 0.1 MG/DL (ref 0–0.3)
BILIRUB SERPL-MCNC: 0.6 MG/DL (ref 0–1.2)
BUN SERPL-MCNC: 15 MG/DL (ref 6–23)
CALCIUM SERPL-MCNC: 9.4 MG/DL (ref 8.6–10.6)
CHLORIDE SERPL-SCNC: 105 MMOL/L (ref 98–107)
CHOLEST SERPL-MCNC: 293 MG/DL (ref 0–199)
CHOLESTEROL/HDL RATIO: 5
CO2 SERPL-SCNC: 28 MMOL/L (ref 21–32)
CREAT SERPL-MCNC: 0.74 MG/DL (ref 0.5–1.05)
EGFRCR SERPLBLD CKD-EPI 2021: >90 ML/MIN/1.73M*2
EOSINOPHIL # BLD AUTO: 0.05 X10*3/UL (ref 0–0.7)
EOSINOPHIL NFR BLD AUTO: 1.5 %
ERYTHROCYTE [DISTWIDTH] IN BLOOD BY AUTOMATED COUNT: 12.1 % (ref 11.5–14.5)
GLUCOSE SERPL-MCNC: 90 MG/DL (ref 74–99)
HCT VFR BLD AUTO: 35.8 % (ref 36–46)
HCV AB SER QL: NONREACTIVE
HDLC SERPL-MCNC: 58.7 MG/DL
HGB BLD-MCNC: 11.5 G/DL (ref 12–16)
HIV 1+2 AB+HIV1 P24 AG SERPL QL IA: NONREACTIVE
IMM GRANULOCYTES # BLD AUTO: 0.01 X10*3/UL (ref 0–0.7)
IMM GRANULOCYTES NFR BLD AUTO: 0.3 % (ref 0–0.9)
LDLC SERPL CALC-MCNC: 197 MG/DL
LYMPHOCYTES # BLD AUTO: 0.98 X10*3/UL (ref 1.2–4.8)
LYMPHOCYTES NFR BLD AUTO: 28.9 %
MCH RBC QN AUTO: 28.6 PG (ref 26–34)
MCHC RBC AUTO-ENTMCNC: 32.1 G/DL (ref 32–36)
MCV RBC AUTO: 89 FL (ref 80–100)
MONOCYTES # BLD AUTO: 0.41 X10*3/UL (ref 0.1–1)
MONOCYTES NFR BLD AUTO: 12.1 %
NEUTROPHILS # BLD AUTO: 1.93 X10*3/UL (ref 1.2–7.7)
NEUTROPHILS NFR BLD AUTO: 56.9 %
NON HDL CHOLESTEROL: 234 MG/DL (ref 0–149)
NRBC BLD-RTO: 0 /100 WBCS (ref 0–0)
PLATELET # BLD AUTO: 222 X10*3/UL (ref 150–450)
POTASSIUM SERPL-SCNC: 4.2 MMOL/L (ref 3.5–5.3)
PROT SERPL-MCNC: 6.6 G/DL (ref 6.4–8.2)
RBC # BLD AUTO: 4.02 X10*6/UL (ref 4–5.2)
SODIUM SERPL-SCNC: 142 MMOL/L (ref 136–145)
TRIGL SERPL-MCNC: 189 MG/DL (ref 0–149)
TSH SERPL-ACNC: 3.84 MIU/L (ref 0.44–3.98)
VLDL: 38 MG/DL (ref 0–40)
WBC # BLD AUTO: 3.4 X10*3/UL (ref 4.4–11.3)

## 2024-10-07 PROCEDURE — 84443 ASSAY THYROID STIM HORMONE: CPT

## 2024-10-07 PROCEDURE — 82248 BILIRUBIN DIRECT: CPT

## 2024-10-07 PROCEDURE — 87389 HIV-1 AG W/HIV-1&-2 AB AG IA: CPT

## 2024-10-07 PROCEDURE — 80061 LIPID PANEL: CPT

## 2024-10-07 PROCEDURE — 36415 COLL VENOUS BLD VENIPUNCTURE: CPT

## 2024-10-07 PROCEDURE — 86803 HEPATITIS C AB TEST: CPT

## 2024-10-07 PROCEDURE — 85025 COMPLETE CBC W/AUTO DIFF WBC: CPT

## 2024-10-07 PROCEDURE — 80053 COMPREHEN METABOLIC PANEL: CPT

## 2024-10-08 ENCOUNTER — TELEPHONE (OUTPATIENT)
Dept: CARDIOLOGY | Facility: CLINIC | Age: 54
End: 2024-10-08
Payer: COMMERCIAL

## 2024-10-08 DIAGNOSIS — G89.29 CHRONIC LEFT SHOULDER PAIN: ICD-10-CM

## 2024-10-08 DIAGNOSIS — E78.2 MIXED HYPERLIPIDEMIA: Primary | ICD-10-CM

## 2024-10-08 DIAGNOSIS — M25.512 CHRONIC LEFT SHOULDER PAIN: ICD-10-CM

## 2024-10-08 RX ORDER — LIDOCAINE 50 MG/G
PATCH TOPICAL
Qty: 90 PATCH | Refills: 0 | Status: SHIPPED | OUTPATIENT
Start: 2024-10-08

## 2024-10-08 NOTE — RESULT ENCOUNTER NOTE
Your cholesterol panel indicates a significant elevation, with a total cholesterol level of 293 and an LDL of 197. It is important to continue your follow-up with Dr. Crowe and his team to ensure that you are on the appropriate medication to manage these levels effectively.    On a positive note, your tests for Hepatitis C and HIV returned negative results. Additionally, your blood sugar, kidney, liver function, and bilirubin levels are all within normal ranges, which are encouraging signs.    However, your complete blood cell count shows a consistently low white blood cell (WBC) count at 3.4, down from previous readings of 3.9 and 3.1. There is also a slight anemia noted, with your hemoglobin at 11.5 and hematocrit at 35.8. While I am not certain, these changes could potentially be related to your current breast cancer medication. I recommend discussing these findings with your hematologist to explore any possible connections and to determine if any adjustments to your treatment plan are necessary.    Your thyroid function appears stable, with a TSH level of 3.84, which is within an acceptable range.    Please continue to reach out if you have any questions or need further assistance. I am here to support you in managing your health effectively.

## 2024-10-09 ENCOUNTER — APPOINTMENT (OUTPATIENT)
Dept: DERMATOLOGY | Facility: CLINIC | Age: 54
End: 2024-10-09
Payer: COMMERCIAL

## 2024-10-09 DIAGNOSIS — L90.5 SCAR CONDITIONS AND FIBROSIS OF SKIN: ICD-10-CM

## 2024-10-09 DIAGNOSIS — B07.9 VIRAL WARTS, UNSPECIFIED TYPE: ICD-10-CM

## 2024-10-09 DIAGNOSIS — L57.8 DIFFUSE PHOTODAMAGE OF SKIN: ICD-10-CM

## 2024-10-09 DIAGNOSIS — D18.00 HEMANGIOMA, UNSPECIFIED SITE: ICD-10-CM

## 2024-10-09 DIAGNOSIS — L81.0 POST-INFLAMMATORY HYPERPIGMENTATION: ICD-10-CM

## 2024-10-09 DIAGNOSIS — L82.1 SEBORRHEIC KERATOSIS: Primary | ICD-10-CM

## 2024-10-09 DIAGNOSIS — Z85.828 HISTORY OF NONMELANOMA SKIN CANCER: ICD-10-CM

## 2024-10-09 DIAGNOSIS — L81.4 LENTIGO: ICD-10-CM

## 2024-10-09 PROCEDURE — 17110 DESTRUCTION B9 LES UP TO 14: CPT

## 2024-10-09 PROCEDURE — 99213 OFFICE O/P EST LOW 20 MIN: CPT | Performed by: DERMATOLOGY

## 2024-10-09 RX ORDER — TRETINOIN 0.25 MG/G
CREAM TOPICAL
Qty: 60 G | Refills: 3 | Status: SHIPPED | OUTPATIENT
Start: 2024-10-09

## 2024-10-09 ASSESSMENT — DERMATOLOGY QUALITY OF LIFE (QOL) ASSESSMENT
RATE HOW BOTHERED YOU ARE BY EFFECTS OF YOUR SKIN PROBLEMS ON YOUR ACTIVITIES (EG, GOING OUT, ACCOMPLISHING WHAT YOU WANT, WORK ACTIVITIES OR YOUR RELATIONSHIPS WITH OTHERS): 0 - NEVER BOTHERED
RATE HOW EMOTIONALLY BOTHERED YOU ARE BY YOUR SKIN PROBLEM (FOR EXAMPLE, WORRY, EMBARRASSMENT, FRUSTRATION): 2
RATE HOW BOTHERED YOU ARE BY SYMPTOMS OF YOUR SKIN PROBLEM (EG, ITCHING, STINGING BURNING, HURTING OR SKIN IRRITATION): 5
RATE HOW BOTHERED YOU ARE BY SYMPTOMS OF YOUR SKIN PROBLEM (EG, ITCHING, STINGING BURNING, HURTING OR SKIN IRRITATION): 5
WHAT SINGLE SKIN CONDITION LISTED BELOW IS THE PATIENT ANSWERING THE QUALITY-OF-LIFE ASSESSMENT QUESTIONS ABOUT: NONE OF THE ABOVE
WHAT SINGLE SKIN CONDITION LISTED BELOW IS THE PATIENT ANSWERING THE QUALITY-OF-LIFE ASSESSMENT QUESTIONS ABOUT: NONE OF THE ABOVE
RATE HOW EMOTIONALLY BOTHERED YOU ARE BY YOUR SKIN PROBLEM (FOR EXAMPLE, WORRY, EMBARRASSMENT, FRUSTRATION): 2
RATE HOW BOTHERED YOU ARE BY EFFECTS OF YOUR SKIN PROBLEMS ON YOUR ACTIVITIES (EG, GOING OUT, ACCOMPLISHING WHAT YOU WANT, WORK ACTIVITIES OR YOUR RELATIONSHIPS WITH OTHERS): 0 - NEVER BOTHERED

## 2024-10-09 ASSESSMENT — PATIENT GLOBAL ASSESSMENT (PGA): WHAT IS THE PGA: PATIENT GLOBAL ASSESSMENT:  1 - CLEAR

## 2024-10-09 NOTE — PROGRESS NOTES
Subjective     Ruba Woodard is a 54 y.o. female who presents for the following: Skin Check (FBSE , spot on right hand and right ear and inside left ear ) and Wart (Left pinky - no prior treatment ).     Presents today for FBSE. Notes she is concerned about the pigmentary changes on her bilateral shins from prior biopsies. Otherwise no complaints.     Review of Systems:  No other skin or systemic complaints other than what is documented elsewhere in the note.    The following portions of the chart were reviewed this encounter and updated as appropriate:         Skin Cancer History  No skin cancer on file.      Specialty Problems          Dermatology Problems    Actinic keratosis    Allergic contact dermatitis, unspecified cause    Dermatitis, unspecified    Dyshidrosis (pompholyx)    Hemangioma of skin and subcutaneous tissue    Melanocytic nevi of other parts of face    Melanocytic nevi of scalp and neck    Melanocytic nevi of trunk    Melanocytic nevi of unspecified lower limb, including hip    Melanocytic nevi of unspecified upper limb, including shoulder    Other melanin hyperpigmentation    Scar condition and fibrosis of skin    Squamous cell carcinoma of skin of left upper limb, including shoulder    Hyperpigmentation of skin        Objective   Well appearing patient in no apparent distress; mood and affect are within normal limits.    A full examination was performed including scalp, head, eyes, ears, nose, lips, neck, chest, axillae, abdomen, back, buttocks, bilateral upper extremities, bilateral lower extremities, hands, feet, fingers, toes, fingernails, and toenails. All findings within normal limits unless otherwise noted below.    Assessment/Plan   1. Seborrheic keratosis  Scattered on the patient's face, neck, trunk, and extremities, there are multiple tan- to light brown-colored, hyperkeratotic, stuck-on appearing papules of varying size and shape    Seborrheic Keratoses - the benign nature of these  lesions was discussed with the patient today and reassurance provided.  No treatment is medically indicated for these lesions at this time.    2. Scar conditions and fibrosis of skin    3. Lentigo  Multiple tan- to light brown-colored, round- to oval-shaped, symmetric and uniform-appearing macules and small patches consistent with lentigines scattered in sun-exposed areas.    Solar Lentigines and photodamage.  The clinically benign-appearing nature of these lesions and their relation to chronic sun exposure were discussed with the patient today and reassurance provided.  None of these lesions meet threshold for biopsy today, and thus no treatment is medically indicated for these lesions at this time.  The signs and symptoms of skin cancer were reviewed and the patient was advised to practice sun protection and sun avoidance, use daily sunscreen, and perform regular self skin exams.  The patient was instructed to monitor these lesions for any changes, such as in size, shape, or color, or associated symptoms and to call our office to schedule a return visit for re-evaluation if any such changes or symptoms are noticed in the future.  The patient expressed understanding and is in agreement with this plan.    4. Hemangioma, unspecified site  Scattered on the patient's face, neck, trunk, and extremities, there are multiple small, round, cherry red- to purplish-colored, symmetric, uniform, vascular-appearing macules and papules    Cherry Angiomas - the benign nature of these vascular lesions was discussed with the patient today and reassurance provided.  No treatment is medically indicated for these lesions at this time.    5. Diffuse photodamage of skin  Diffuse photodamage with actinic changes with telangiectasia and mottled pigmentation in sun-exposed areas.    Photodamage.  The signs and symptoms of skin cancer were reviewed and the patient was advised to practice sun protection and sun avoidance, use daily sunscreen,  and perform regular self skin exams.  Sun protection was discussed, including avoiding the mid-day sun, wearing a sunscreen with SPF at least 50, and stressing the need for reapplication of sunscreen and applying more than they think they need.    6. Post-inflammatory hyperpigmentation  Scattered hyperpigmented patches on bilateral shins    Post-inflammatory hyperpigmentation  - Discussed etiology with patient as it relates to pigmentary changes with prior biopsy sites   - Discussed limited treatment options including topical steroids, topical retinoids v hydroquinone  - START tretinoin 0.025% cream nightly to bilateral shins    Related Medications  tretinoin (Retin-A) 0.025 % cream  Apply nightly to bilateral shins    7. History of nonmelanoma skin cancer  On the patient's mid back and left distal dorsal forearm, there are well-healed scars with no evidence of recurrent growth on exam today.    History of nonmelanoma skin cancers and actinic keratoses and photodamage.  There is no evidence of recurrence at the sites of the patient's previously treated NMSCs on exam today.  The signs and symptoms of skin cancer were reviewed and the patient was advised to practice sun protection and sun avoidance, use daily sunscreen, and perform regular self skin exams.  I will have the patient return to our office in 6 to 12 months, pending the above biopsy result, for routine follow-up and skin exam, and the patient was instructed to call our office should the patient notice any new, changing, symptomatic, or otherwise concerning skin lesions before then.  The patient expressed understanding and is in agreement with this plan.    Related Procedures  Follow Up In Dermatology - Established Patient    8. Viral warts, unspecified type  Left Dorsal Mid 5th Finger  Small verrucous flesh colored papule on left dorsal 5th digit    Viral wart  -Discussed etiology with patient as it relates viral infection  - Reviewed treatment options  including debridement and LN2  - Will treat with LN2 today    Destr of lesion - Left Dorsal Mid 5th Finger  Complexity: simple    Destruction method: cryotherapy    Informed consent: discussed and consent obtained    Lesion destroyed using liquid nitrogen: Yes    Outcome: patient tolerated procedure well with no complications    Post-procedure details: wound care instructions given        Primo Love MD  PGY4 Dermatology    I saw and evaluated the patient, participating in the key elements of the service.  I discussed the findings, assessment and plan with the resident and agree with resident’s findings and plan as documented in the resident's note.  I was immediately available for the entirety of the procedure(s) and present for the key and critical portions.     Zachary Breen MD PhD

## 2024-10-14 ENCOUNTER — NURSE TRIAGE (OUTPATIENT)
Dept: ADMISSION | Facility: HOSPITAL | Age: 54
End: 2024-10-14
Payer: COMMERCIAL

## 2024-10-14 NOTE — TELEPHONE ENCOUNTER
The patient is aware of the recommendations below, is already in physical therapy and doing all the exercising below. Already doing tylenol and ibuprofen as needed. Will take a 10 day break and call us back at that point to report symptoms. She was appreciative of all the information/suggestions. Denied further questions or concerns at this time.

## 2024-10-14 NOTE — TELEPHONE ENCOUNTER
The patient called in and left a , states she is currently taking letrozole however is having 'horrible' arthritic pain in her hands, fingers and wrists. She thought it was her cholesterol medication however when they removed that from her medication list, the pain persisted. States that she is trying OTC ibuprofen and tylenol with no relief. Does not want to come off letrozole but also doesn't know how to manage this pain, Please advise any advice regarding this symptom/side effect  Additional Information   Commented on: Where is the pain? Is there more than one place where you're having pain?     Hands/wrists/fingers   Commented on: What does the pain feel like? What words would describe your pain (sharp, burning, stabbing, etc)?     Arthritis pain    Protocols used: Pain

## 2024-10-15 NOTE — PROGRESS NOTES
History of Present Complaint:  The patient was referred to us by Referring Provider: Gregg Gonzalez, [facility] for left shoulder pain. this is 54 y.o.  female {Accompanied by:[id number]}with a past history of hyperlipidemia, hot flashes, breast cancer treated with partial mastectomy followed by radiation and on letrozole, hypothyroidism, work-related injury with right arthroscopic rotator cuff repair with balloon placement with recent fall resulted in increased pain.  New MRI did not show but tendinopathy and degenerative changes no rupture of the tendon.  Patient received shoulder injection by orthopedic APRN presenting with {kt mrdica symptoms:60177}    Pain started {pain onset:40303}  Pain is {Better or worse:01156}   Patient history significant for the following red flags: {Red flags:91639}  The pain is described as {Pain description:92367} and is relieved by {pain relief:13808}      Prior Pain Therapies: {Prior pain therapy:75353}    Past surgical history:  {Past surgical history:85608}           Procedures:   *** the patient has had a ***% improvement in pain.    Portions of record reviewed for pertinent issues: active problem list, medication list, allergies, family history, social history, notes from last encounter, encounters, lab results, imaging and other available records.    I have personally reviewed the [facility] report for this patient. This report is scanned into the electronic medical record. I have considered the risks of abuse, dependence, addiction and diversion. It showed: Percocet 5 mg from orthopedics  OPIOID RISK ASSESSMENT SCORE ***/26  Opioid agreement: ***  Activities of daily living: ***  Adverse effects: ***  Analgesia: W/O ***/10, W ***/10  {***}  Toxicology screen: ***  Aberrant behavior: ***      Diagnostic studies:  6/20/2024 CT cervical spine showed degenerative changes  9/18/2023 MRI of the right shoulder showed tendinopathy of the distal rotator cuff without tear with moderate  degenerative changes in the acromioclavicular and glenohumeral humeral joints    Employment/disability/litigation: {ktlitigation:37142}    Social History:  {KT social history:38936}       Review of Systems       Physical Exam       Assessment  ***           Plan  At least 50% of the visit was involved in the discussion of the options for treatment. We discussed exercises, medication, interventional therapies and surgery. Healthy life style is essential with patient hard work to achieve the wellness. In addition; discussion with the patient and/or family about any of the diagnostic results, impressions and/or recommended diagnostic studies, prognosis, risks and benefits of treatment options, instructions for treatment and/or follow-up, importance of compliance with chosen treatment options, risk-factor reduction, and patient/family education.         Recommended Pool therapy, walking in the pool, at least 3x per week for 30 minutes  Recommend self-directed physical therapy with at least daily exercises for minimum of 20-minute, brochure was handed to the patient  *** Smoking cessation  Healthy lifestyle and anti-inflammatory diet in addition to weight control discussed with the patient  Alternative chronic pain therapies was discussed, encouraged and information was handed  Return to Clinic 3 months       *Please note this report has been produced using speech recognition software and may contain errors related to that system including grammar, punctuation and spelling as well as words and phrases that may be inappropriate. If there are questions or concerns, please feel free to contact me to clarify.    Jaki Saenz MD

## 2024-10-16 ENCOUNTER — APPOINTMENT (OUTPATIENT)
Dept: ORTHOPEDIC SURGERY | Facility: CLINIC | Age: 54
End: 2024-10-16
Payer: COMMERCIAL

## 2024-10-18 ENCOUNTER — APPOINTMENT (OUTPATIENT)
Dept: ORTHOPEDIC SURGERY | Facility: CLINIC | Age: 54
End: 2024-10-18
Payer: COMMERCIAL

## 2024-10-21 ENCOUNTER — APPOINTMENT (OUTPATIENT)
Dept: ORTHOPEDIC SURGERY | Facility: CLINIC | Age: 54
End: 2024-10-21
Payer: COMMERCIAL

## 2024-10-21 DIAGNOSIS — M75.122 COMPLETE TEAR OF LEFT ROTATOR CUFF, UNSPECIFIED WHETHER TRAUMATIC: Primary | ICD-10-CM

## 2024-10-21 DIAGNOSIS — M25.511 RIGHT SHOULDER PAIN, UNSPECIFIED CHRONICITY: ICD-10-CM

## 2024-10-21 DIAGNOSIS — M19.90 ARTHRITIS: ICD-10-CM

## 2024-10-21 PROCEDURE — 99214 OFFICE O/P EST MOD 30 MIN: CPT | Performed by: NURSE PRACTITIONER

## 2024-10-21 PROCEDURE — 20610 DRAIN/INJ JOINT/BURSA W/O US: CPT | Mod: RT | Performed by: NURSE PRACTITIONER

## 2024-10-21 PROCEDURE — 2500000004 HC RX 250 GENERAL PHARMACY W/ HCPCS (ALT 636 FOR OP/ED): Performed by: NURSE PRACTITIONER

## 2024-10-21 PROCEDURE — 1036F TOBACCO NON-USER: CPT | Performed by: NURSE PRACTITIONER

## 2024-10-21 RX ORDER — METHOCARBAMOL 500 MG/1
500 TABLET, FILM COATED ORAL 3 TIMES DAILY
Qty: 45 TABLET | Refills: 0 | Status: SHIPPED | OUTPATIENT
Start: 2024-10-21 | End: 2024-11-05
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
## 2024-10-24 ENCOUNTER — APPOINTMENT (OUTPATIENT)
Dept: PAIN MEDICINE | Facility: CLINIC | Age: 54
End: 2024-10-24
Payer: COMMERCIAL

## 2024-10-25 RX ORDER — LIDOCAINE HYDROCHLORIDE 10 MG/ML
4 INJECTION, SOLUTION INFILTRATION; PERINEURAL
Status: COMPLETED | OUTPATIENT
Start: 2024-10-21 | End: 2024-10-21

## 2024-10-25 RX ORDER — TRIAMCINOLONE ACETONIDE 40 MG/ML
40 INJECTION, SUSPENSION INTRA-ARTICULAR; INTRAMUSCULAR
Status: COMPLETED | OUTPATIENT
Start: 2024-10-21 | End: 2024-10-21

## 2024-10-29 ENCOUNTER — APPOINTMENT (OUTPATIENT)
Dept: SURGICAL ONCOLOGY | Facility: CLINIC | Age: 54
End: 2024-10-29
Payer: COMMERCIAL

## 2024-10-31 ENCOUNTER — APPOINTMENT (OUTPATIENT)
Dept: ORTHOPEDIC SURGERY | Facility: CLINIC | Age: 54
End: 2024-10-31
Payer: COMMERCIAL

## 2024-11-01 ENCOUNTER — OFFICE VISIT (OUTPATIENT)
Dept: SURGICAL ONCOLOGY | Facility: CLINIC | Age: 54
End: 2024-11-01
Payer: COMMERCIAL

## 2024-11-01 ENCOUNTER — HOSPITAL ENCOUNTER (OUTPATIENT)
Dept: RADIOLOGY | Facility: CLINIC | Age: 54
Discharge: HOME | End: 2024-11-01
Payer: COMMERCIAL

## 2024-11-01 ENCOUNTER — APPOINTMENT (OUTPATIENT)
Dept: ORTHOPEDIC SURGERY | Facility: CLINIC | Age: 54
End: 2024-11-01
Payer: COMMERCIAL

## 2024-11-01 VITALS
HEART RATE: 68 BPM | TEMPERATURE: 97.6 F | WEIGHT: 143.2 LBS | BODY MASS INDEX: 23.11 KG/M2 | SYSTOLIC BLOOD PRESSURE: 132 MMHG | DIASTOLIC BLOOD PRESSURE: 76 MMHG

## 2024-11-01 DIAGNOSIS — C50.412 MALIGNANT NEOPLASM OF UPPER-OUTER QUADRANT OF LEFT BREAST IN FEMALE, ESTROGEN RECEPTOR POSITIVE: ICD-10-CM

## 2024-11-01 DIAGNOSIS — M17.0 PRIMARY OSTEOARTHRITIS OF BOTH KNEES: ICD-10-CM

## 2024-11-01 DIAGNOSIS — Z17.0 MALIGNANT NEOPLASM OF UPPER-OUTER QUADRANT OF LEFT BREAST IN FEMALE, ESTROGEN RECEPTOR POSITIVE: Primary | ICD-10-CM

## 2024-11-01 DIAGNOSIS — Z17.0 MALIGNANT NEOPLASM OF UPPER-OUTER QUADRANT OF LEFT BREAST IN FEMALE, ESTROGEN RECEPTOR POSITIVE: ICD-10-CM

## 2024-11-01 DIAGNOSIS — C50.412 MALIGNANT NEOPLASM OF UPPER-OUTER QUADRANT OF LEFT BREAST IN FEMALE, ESTROGEN RECEPTOR POSITIVE: Primary | ICD-10-CM

## 2024-11-01 PROCEDURE — 73564 X-RAY EXAM KNEE 4 OR MORE: CPT | Mod: 50

## 2024-11-01 PROCEDURE — 77066 DX MAMMO INCL CAD BI: CPT

## 2024-11-01 PROCEDURE — 99214 OFFICE O/P EST MOD 30 MIN: CPT | Mod: 25 | Performed by: STUDENT IN AN ORGANIZED HEALTH CARE EDUCATION/TRAINING PROGRAM

## 2024-11-01 PROCEDURE — 1036F TOBACCO NON-USER: CPT | Performed by: SURGERY

## 2024-11-01 PROCEDURE — 99213 OFFICE O/P EST LOW 20 MIN: CPT | Performed by: SURGERY

## 2024-11-01 ASSESSMENT — PAIN SCALES - GENERAL
PAINLEVEL_OUTOF10: 0-NO PAIN
PAINLEVEL_OUTOF10: 5 - MODERATE PAIN

## 2024-11-01 ASSESSMENT — PATIENT HEALTH QUESTIONNAIRE - PHQ9
1. LITTLE INTEREST OR PLEASURE IN DOING THINGS: NOT AT ALL
SUM OF ALL RESPONSES TO PHQ9 QUESTIONS 1 & 2: 0
2. FEELING DOWN, DEPRESSED OR HOPELESS: NOT AT ALL
1. LITTLE INTEREST OR PLEASURE IN DOING THINGS: NOT AT ALL
SUM OF ALL RESPONSES TO PHQ9 QUESTIONS 1 & 2: 0
2. FEELING DOWN, DEPRESSED OR HOPELESS: NOT AT ALL

## 2024-11-01 ASSESSMENT — PAIN - FUNCTIONAL ASSESSMENT: PAIN_FUNCTIONAL_ASSESSMENT: 0-10

## 2024-11-01 ASSESSMENT — PAIN DESCRIPTION - DESCRIPTORS: DESCRIPTORS: SHARP;ACHING
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
## 2024-11-07 ENCOUNTER — APPOINTMENT (OUTPATIENT)
Dept: RADIOLOGY | Facility: CLINIC | Age: 54
End: 2024-11-07
Payer: COMMERCIAL

## 2024-11-08 ENCOUNTER — APPOINTMENT (OUTPATIENT)
Dept: SURGICAL ONCOLOGY | Facility: CLINIC | Age: 54
End: 2024-11-08
Payer: COMMERCIAL

## 2024-11-20 ENCOUNTER — PRE-ADMISSION TESTING (OUTPATIENT)
Dept: PREADMISSION TESTING | Facility: HOSPITAL | Age: 54
End: 2024-11-20
Payer: COMMERCIAL

## 2024-11-20 VITALS
TEMPERATURE: 97.1 F | OXYGEN SATURATION: 100 % | BODY MASS INDEX: 22.46 KG/M2 | DIASTOLIC BLOOD PRESSURE: 82 MMHG | HEART RATE: 66 BPM | SYSTOLIC BLOOD PRESSURE: 123 MMHG | RESPIRATION RATE: 12 BRPM | HEIGHT: 66 IN | WEIGHT: 139.77 LBS

## 2024-11-20 DIAGNOSIS — M75.122 COMPLETE TEAR OF LEFT ROTATOR CUFF, UNSPECIFIED WHETHER TRAUMATIC: ICD-10-CM

## 2024-11-20 DIAGNOSIS — E78.2 MIXED HYPERLIPIDEMIA: ICD-10-CM

## 2024-11-20 LAB
ALBUMIN SERPL BCP-MCNC: 4.4 G/DL (ref 3.4–5)
ALP SERPL-CCNC: 67 U/L (ref 33–110)
ALT SERPL W P-5'-P-CCNC: 29 U/L (ref 7–45)
AST SERPL W P-5'-P-CCNC: 28 U/L (ref 9–39)
BILIRUB DIRECT SERPL-MCNC: 0.2 MG/DL (ref 0–0.3)
BILIRUB SERPL-MCNC: 0.7 MG/DL (ref 0–1.2)
PROT SERPL-MCNC: 7.1 G/DL (ref 6.4–8.2)

## 2024-11-20 PROCEDURE — 36415 COLL VENOUS BLD VENIPUNCTURE: CPT

## 2024-11-20 PROCEDURE — 80076 HEPATIC FUNCTION PANEL: CPT

## 2024-11-20 PROCEDURE — 83718 ASSAY OF LIPOPROTEIN: CPT | Mod: BEALAB,WESLAB

## 2024-11-20 RX ORDER — FERROUS SULFATE, DRIED 160(50) MG
1 TABLET, EXTENDED RELEASE ORAL DAILY
COMMUNITY

## 2024-11-20 ASSESSMENT — DUKE ACTIVITY SCORE INDEX (DASI)
CAN YOU DO HEAVY WORK AROUND THE HOUSE LIKE SCRUBBING FLOORS OR LIFTING AND MOVING HEAVY FURNITURE: YES
CAN YOU DO LIGHT WORK AROUND THE HOUSE LIKE DUSTING OR WASHING DISHES: YES
CAN YOU DO MODERATE WORK AROUND THE HOUSE LIKE VACUUMING, SWEEPING FLOORS OR CARRYING GROCERIES: YES
TOTAL_SCORE: 50.2
CAN YOU PARTICIPATE IN STRENOUS SPORTS LIKE SWIMMING, SINGLES TENNIS, FOOTBALL, BASKETBALL, OR SKIING: YES
CAN YOU WALK INDOORS, SUCH AS AROUND YOUR HOUSE: YES
CAN YOU DO YARD WORK LIKE RAKING LEAVES, WEEDING OR PUSHING A MOWER: YES
CAN YOU WALK A BLOCK OR TWO ON LEVEL GROUND: YES
CAN YOU PARTICIPATE IN MODERATE RECREATIONAL ACTIVITIES LIKE GOLF, BOWLING, DANCING, DOUBLES TENNIS OR THROWING A BASEBALL OR FOOTBALL: YES
CAN YOU RUN A SHORT DISTANCE: NO
DASI METS SCORE: 8.9
CAN YOU TAKE CARE OF YOURSELF (EAT, DRESS, BATHE, OR USE TOILET): YES
CAN YOU CLIMB A FLIGHT OF STAIRS OR WALK UP A HILL: YES
CAN YOU HAVE SEXUAL RELATIONS: YES

## 2024-11-20 ASSESSMENT — PAIN SCALES - GENERAL: PAINLEVEL_OUTOF10: 2

## 2024-11-20 ASSESSMENT — PAIN DESCRIPTION - DESCRIPTORS: DESCRIPTORS: ACHING;DULL

## 2024-11-20 ASSESSMENT — PAIN - FUNCTIONAL ASSESSMENT: PAIN_FUNCTIONAL_ASSESSMENT: 0-10

## 2024-11-20 NOTE — H&P (VIEW-ONLY)
CPM/PAT Evaluation       Name: Ruba Woodard (Ruba Woodard)  /Age: 1970/54 y.o.     In-Person       Chief Complaint: left rotator cuff repair     Patient is an alert and oriented 54 year old male scheduled for a left shoulder arthroscopy, debridement on  with Dr. White. PMHX includes left breast cancer with radiation about 2 years ago, high cholesterol, hypertension, hypothyroidism not on medications, states that during covid she developed thyrotoxicosis and since they have been monitoring she has been just slightly hypo. Past surgical history includes left breast lumpectomy, right knee meniscus repair and tonsillectomy. Presents to [facility] PAT today for perioperative risk stratification and optimization.     Medication Documentation Review Audit       Reviewed by Janay Taylor RN (Registered Nurse) on 24 at 0955      Medication Order Taking? Sig Documenting Provider Last Dose Status   acetaminophen (Tylenol) 500 mg tablet [id number] Yes Take 2 tablets (1,000 mg) by mouth every 6 hours if needed. Historical Provider, MD 2024 Active   ascorbic acid, vitamin C, 500 mg capsule [id number] Yes Take 500 mg by mouth once daily. Historical Provider, MD 2024 Morning Active   atorvastatin (Lipitor) 40 mg tablet [id number] Yes Take 1 tablet (40 mg) by mouth once daily. Wally Crowe MD 2024 Evening Active   calcium carbonate-vitamin D3 500 mg-5 mcg (200 unit) tablet [id number] Yes Take 1 tablet by mouth once daily. Historical Provider, MD 2024 Morning Active   clindamycin (Cleocin T) 1 % lotion [id number] No Apply 1 Application topically once daily as needed. Historical Provider, MD Unknown Active   diclofenac sodium (Voltaren) 1 % gel gel [id number] Yes Apply sparingly to affected areas once daily Larisa Ontiveros, APRN-CNP Past Week Active   escitalopram (Lexapro) 10 mg tablet [id number] Yes Take 1 tablet (10 mg) by mouth once daily. Gregg Gonzalez DO 2024 Evening Active    ibuprofen 200 mg tablet [id number] Yes Take 1 tablet (200 mg) by mouth every 6 hours if needed. Historical Provider, MD Past Week Active   letrozole (Femara) 2.5 mg tablet [id number] Yes Take 1 tablet (2.5 mg total) by mouth once daily.  Take with or without food. Gomez Olivera MD 2024 Evening Active   lidocaine (Lidoderm) 5 % patch [id number] Yes PLACE 1 PATCH OVER EVERY 12 HOURS ON THE SKIN ONCE  DAILY. REMOVE AND DISCARD PATCH WITHIN 12 HOURS OR AS DIRECTED BY MD   Patient taking differently: once daily as needed.    MANUEL Canchola Past Week Active   methocarbamol (Robaxin) 500 mg tablet [id number] No Take 1 tablet (500 mg) by mouth 3 times a day for 15 days.   Patient not taking: Reported on 2024    MANUEL Canchola More than a month  24 2359   multivitamin (Multiple Vitamins) tablet [id number] Yes Take by mouth once daily. Historical Provider, MD 2024 Morning Active   ondansetron ODT (Zofran-ODT) 4 mg disintegrating tablet [id number] No Take 1 tablet (4 mg) by mouth every 6 hours if needed for nausea or vomiting. Historical Provider, MD Unknown Active   tretinoin (Retin-A) 0.025 % cream [id number] Yes Apply nightly to bilateral shins Kyle Love MD 2024 Morning Active                      Review of Systems   Constitutional: Negative for chills, decreased appetite, diaphoresis, fever and malaise/fatigue.   Eyes:  Negative for blurred vision and double vision.   Cardiovascular:  Negative for chest pain, claudication, cyanosis, dyspnea on exertion, irregular heartbeat, leg swelling, near-syncope and palpitations.   Respiratory:  Negative for cough, hemoptysis, shortness of breath and wheezing.    Endocrine: Negative for cold intolerance, heat intolerance, polydipsia, polyphagia and polyuria.   Gastrointestinal:  Negative for abdominal pain, constipation, diarrhea, dysphagia, nausea and vomiting.   Genitourinary:  Negative for bladder incontinence, dysuria,  hematuria, incomplete emptying, nocturia, frequency, pelvic pain and urgency.   Neurological:  Negative for headaches, light-headedness, paresthesias, sensory change and weakness.   Psychiatric/Behavioral:  Negative for altered mental status.    Musculoskeletal: Negative for myalgias, arthralgias     Vitals and nursing note reviewed.     Physical exam  Constitutional:       Appearance: Normal appearance. She is Normal.   HENT:      Head: Normocephalic and atraumatic.      Mouth/Throat:      Mouth: Mucous membranes are moist.      Pharynx: Oropharynx is clear.   Eyes:      Extraocular Movements: Extraocular movements intact.      Conjunctiva/sclera: Conjunctivae normal.      Pupils: Pupils are equal, round, and reactive to light.   Cardiovascular:      PMI at left midclavicular line. Normal rate. Regular rhythm. Normal S1. Normal S2.       Murmurs: There is no murmur.      No gallop.  No click. No rub.       No audible carotid bruit     No lower extremity edema on exam  Pulmonary:      Effort: Pulmonary effort is normal.      Breath sounds: Normal breath sounds.   Abdominal:      General: Abdomen is flat. Bowel sounds are normal.      Palpations: Abdomen is soft and non-tender  Musculoskeletal:      Cervical back: Normal range of motion and neck supple.   Skin:     General: Skin is warm and dry.      Capillary Refill: Capillary refill takes less than 2 seconds.   Neurological:      General: No focal deficit present.      Mental Status: She is alert and oriented to person, place, and time. Mental status is at baseline.   Psychiatric:         Mood and Affect: Mood normal.         Behavior: Behavior normal.         Thought Content: Thought content normal.         Judgment: Judgment normal.     Vitals and nursing note reviewed. Physical exam within normal limits.     Assessment & Plan:    Neuro:  No diagnosis or significant findings on chart review or clinical presentation and evaluation.     HEENT/Airway:  No diagnosis  or significant findings on chart review or clinical presentation and evaluation.   STOP-BANG Score- 1 points lowrisk for OBED    Mallampati::  II    TM distance::  >3 FB    Neck ROM::  Full  Dentures-denies  Crowns-denies  Implants-denies    Cardiovascular:  No diagnosis or significant findings on chart review or clinical presentation and evaluation.   METS: 8.9  RCRI: 0 points, 3.9%  risk for postoperative MACE   DARRICK: 0.13% risk for postoperative MACE  EKG -normal EKG, normal sinus rhythm (7/22/24)    Pulmonary:  No diagnosis or significant findings on chart review or clinical presentation and evaluation.   ARISCAT: <26 points, 1.6% risk of in-hospital postoperative pulmonary complication  PRODIGY: Low risk for opioid induced respiratory depression  Smoking History-She has never smoked.  Discussed smoking cessation and deep breathing handout given    Renal/Urinary:  No diagnosis or significant findings on chart review or clinical presentation and evaluation, however, the patient is at increased risk of perioperative renal complications secondary to age>/= 54. Preventative measures include BP monitoring, medication compliance, and hydration management.   BMP-Pending    Endocrine:  No diagnosis or significant findings on chart review or clinical presentation and evaluation.     Hematologic/Immunology:  No diagnosis or significant findings on chart review or clinical presentation and evaluation.  The patient is not a Jehovah’s witness and will accept blood and blood products if medically indicated.   History of previous blood transfusions No  CBC-Pending  Caprini Score 4, patient at Moderate for postoperative DVT. Pt supplied education/VTE handout  Anticoagulation use: No     Gastrointestinal:   No diagnosis or significant findings on chart review or clinical presentation and evaluation.   Recreational drug use: Drug use No  Alcohol use none    Infectious disease:   No diagnosis or significant findings on chart review  or clinical presentation and evaluation.       Musculoskeletal:   No diagnosis or significant findings on chart review or clinical presentation and evaluation.   JHFRAT score- 0 points. low risk for falls    Anesthesia:  ASA 2 - Patient with mild systemic disease with no functional limitations  Anticipated anesthesia- consult  History of General anesthesia- no  Complications- No anesthesia complications  No family history of anesthesia complications    Abnormalities noted on PAT evaluation: No    Labs & Imaging ordered:  CBC, BMP  Nickel/metal allergy-negative  Shellfish allergy-negative    Overall, patient Low Risk for the scheduled Low Risk surgery. Discussed with patient medication instructions, NPO guidelines, and any questions or concerns.     Face to face time- 20 minutes

## 2024-11-20 NOTE — PREPROCEDURE INSTRUCTIONS
Medication List            Accurate as of November 20, 2024 10:13 AM. Always use your most recent med list.                acetaminophen 500 mg tablet  Commonly known as: Tylenol  Medication Adjustments for Surgery: Take/Use as prescribed     ascorbic acid (vitamin C) 500 mg capsule  Additional Medication Adjustments for Surgery: Take last dose 7 days before surgery     atorvastatin 40 mg tablet  Commonly known as: Lipitor  Take 1 tablet (40 mg) by mouth once daily.  Medication Adjustments for Surgery: Do Not take on the morning of surgery     calcium carbonate-vitamin D3 500 mg-5 mcg (200 unit) tablet  Additional Medication Adjustments for Surgery: Take last dose 7 days before surgery     clindamycin 1 % lotion  Commonly known as: Cleocin T  Medication Adjustments for Surgery: Do Not take on the morning of surgery     diclofenac sodium 1 % gel  Commonly known as: Voltaren  Apply sparingly to affected areas once daily  Additional Medication Adjustments for Surgery: Take last dose 7 days before surgery     escitalopram 10 mg tablet  Commonly known as: Lexapro  Take 1 tablet (10 mg) by mouth once daily.  Medication Adjustments for Surgery: Take/Use as prescribed     ibuprofen 200 mg tablet  Additional Medication Adjustments for Surgery: Take last dose 7 days before surgery     letrozole 2.5 mg tablet  Commonly known as: Femara  Take 1 tablet (2.5 mg total) by mouth once daily.  Take with or without food.  Medication Adjustments for Surgery: Take/Use as prescribed     lidocaine 5 % patch  Commonly known as: Lidoderm  PLACE 1 PATCH OVER EVERY 12 HOURS ON THE SKIN ONCE  DAILY. REMOVE AND DISCARD PATCH WITHIN 12 HOURS OR AS DIRECTED BY MD  Medication Adjustments for Surgery: Do Not take on the morning of surgery     methocarbamol 500 mg tablet  Commonly known as: Robaxin  Take 1 tablet (500 mg) by mouth 3 times a day for 15 days.  Medication Adjustments for Surgery: Take/Use as prescribed     Multiple Vitamins  tablet  Generic drug: multivitamin  Additional Medication Adjustments for Surgery: Take last dose 7 days before surgery     ondansetron ODT 4 mg disintegrating tablet  Commonly known as: Zofran-ODT  Medication Adjustments for Surgery: Take/Use as prescribed     tretinoin 0.025 % cream  Commonly known as: Retin-A  Apply nightly to bilateral shins  Medication Adjustments for Surgery: Do Not take on the morning of surgery                            NPO Instructions:     Do not eat any food or drink liquid after midnight the night before your surgery/procedure.  Additional Instructions:      Seven/Six Days before Surgery:  Review your medication instructions, stop indicated medications  Five Days before Surgery:  Review your medication instructions, stop indicated medications  Begin using your Hibiclens  Three Days before Surgery:  Review your medication instructions, stop indicated medications  The Day before Surgery:  No smoking or alcohol use 24 hours before surgery  Start using 0.12% CHG mouthwash  Review your medication instructions, stop indicated medications  You will be contacted regarding the time of your arrival to facility and surgery time  Do not eat any food after Midnight  Day of Surgery:  Review your medication instructions, take indicated medications  If you have diabetes, please check your fasting blood sugar upon awakening.  If fasting blood sugar is <80 mg/dl, drink 100 ml of apple juice, time limit of 2 hours before  Wear  comfortable loose fitting clothing  Do not use moisturizers, creams, lotions or perfume  All jewelry and valuables should be left at home     CONTACT SURGEON'S OFFICE IF YOU DEVELOP:  * Fever = 100.4 F   * New respiratory symptoms (e.g. cough, shortness of breath, respiratory distress, sore throat)  * Recent loss of taste or smell  *Flu like symptoms such as headache, fatigue or gastrointestinal symptoms  * You develop any open sores, shingles, burning or painful urination    AND/OR:  * You no longer wish to have the surgery.  * Any other personal circumstances change that may lead to the need to cancel or defer this surgery.  *You were admitted to any hospital within one week of your planned procedure.     SMOKING:  *Quitting smoking can make a huge difference to your health and recovery from surgery.    *If you need help with quitting, call 8-485-QUIT-NOW.     THE DAY BEFORE SURGERY:  *Do not eat any food after midnight the night before your surgery.   SURGICAL TIME:  *You will be contacted between 2 p.m. and 3 p.m. the business day before your surgery with your arrival time.  *If you haven't received a call by 3pm, call [phone]  *Scheduled surgery times may change and you will be notified if this occurs-check your personal voicemail for any updates.     ON THE MORNING OF SURGERY:  *Wear comfortable, loose fitting clothing.   *Do not use moisturizers, creams, lotions or perfume.  *All jewelry and valuables should be left at home.  *Prosthetic devices such as contact lenses, hearing aids, dentures, eyelash extensions, hairpins and body piercing must be removed before surgery.     BRING WITH YOU:  *Photo ID and insurance card  *Current list of medications and allergies  *Pacemaker/Defibrillator/Heart stent cards  *CPAP machine and mask  *Slings/splints/crutches  *Copy of your complete Advanced Directive/DHPOA-if applicable  *Neurostimulator implant remote     PARKING AND ARRIVAL:  *Check in at the Main Entrance desk and let them know you are here for surgery.     IF YOU ARE HAVING OUTPATIENT/SAME DAY SURGERY:  *A responsible adult MUST accompany you at the time of discharge and stay with you for 24 hours after your surgery.  *You may NOT drive yourself home after surgery.  *You may use a taxi or ride sharing service ([email or web address], Uber) to return home ONLY if you are accompanied by a friend or family member.  *Instructions for resuming your medications will be provided by your  surgeon.     Thank you for coming to Pre Admission testing.      If I have prescribed medication please don't forget to  at your pharmacy.      Any questions about today's visit call [phone] and leave a message in the general mailbox.     Patient instructed to ambulate as soon as possible postoperatively to decrease thromboembolic risk.     Carolina AGUILAR-CNP     Thank you for visiting [facility].  If you have any changes to your health condition, please call the surgeons office to alert them and give them details of your symptoms.        Preoperative Fasting Guidelines     Why must I stop eating and drinking near surgery time?  With sedation, food or liquid in your stomach can enter your lungs causing serious complications  Increases nausea and vomiting     When do I need to stop eating and drinking before my surgery?  Do not eat any food after midnight the night before your surgery/procedure.        Additional Instructions:      The Day before Surgery:  -Review your medication instructions, stop indicated medications  -You will be contacted in the evening regarding the time of your arrival to facility and surgery time     Day of Surgery:  -Review your medication instructions, take indicated medications  -Wear comfortable loose fitting clothing  -Do not use moisturizers, creams, lotions or perfume  -All jewelry and valuables should be left at home                   Preoperative Brain Exercises     What are brain exercises?  A brain exercise is any activity that engages your thinking (cognitive) skills.     What types of activities are considered brain exercises?  Jigsaw puzzles, crossword puzzles, word jumble, memory games, word search, and many more.  Many can be found free online or on your phone via a mobile kristyn.     Why should I do brain exercises before my surgery?  More recent research has shown brain exercise before surgery can lower the risk of postoperative  delirium (confusion) which can be especially important for older adults.  Patients who did brain exercises for 5 to 10 hours the days before surgery, cut their risk of postoperative delirium in half up to 1 week after surgery.                         [facility]     Preoperative Deep Breathing Exercises     Why it is important to do deep breathing exercises before my surgery?  Deep breathing exercises strengthen your breathing muscles.  This helps you to recover after your surgery and decreases the chance of breathing complications.        How are the deep breathing exercises done?  Sit straight with your back supported.  Breathe in deeply and slowly through your nose. Your lower rib cage should expand and your abdomen may move forward.  Hold that breath for 3 to 5 seconds.  Breathe out through pursed lips, slowly and completely.  Rest and repeat 10 times every hour while awake.  Rest longer if you become dizzy or lightheaded.                      [facility]     Preoperative Deep Breathing Exercises     Why it is important to do deep breathing exercises before my surgery?  Deep breathing exercises strengthen your breathing muscles.  This helps you to recover after your surgery and decreases the chance of breathing complications.        How are the deep breathing exercises done?  Sit straight with your back supported.  Breathe in deeply and slowly through your nose. Your lower rib cage should expand and your abdomen may move forward.  Hold that breath for 3 to 5 seconds.  Breathe out through pursed lips, slowly and completely.  Rest and repeat 10 times every hour while awake.  Rest longer if you become dizzy or lightheaded.        Patient Information: Incentive Spirometer  What is an incentive spirometer?  An incentive spirometer is a device used before and after surgery to “exercise” your lungs.  It helps you to take deeper breaths to expand your lungs.  Below is  an example of a basic incentive spirometer.  The device you receive may differ slightly but they all function the same.    Why do I need to use an incentive spirometer?  Using your incentive spirometer prepares your lungs for surgery and helps prevent lung problems after surgery.  How do I use my incentive spirometer?  When you're using your incentive spirometer, make sure to breathe through your mouth. If you breathe through your nose, the incentive spirometer won't work properly. You can hold your nose if you have trouble.  If you feel dizzy at any time, stop and rest. Try again at a later time.  Follow the steps below:  Set up your incentive spirometer, expand the flexible tubing and connect to the outlet.  Sit upright in a chair or bed. Hold the incentive spirometer at eye level.   Put the mouthpiece in your mouth and close your lips tightly around it. Slowly breathe out (exhale) completely.  Breathe in (inhale) slowly through your mouth as deeply as you can. As you take a breath, you will see the piston rise inside the large column. While the piston rises, the indicator should move upwards. It should stay in between the 2 arrows (see Figure).  Try to get the piston as high as you can, while keeping the indicator between the arrows.   If the indicator doesn't stay between the arrows, you're breathing either too fast or too slow.  When you get it as high as you can, hold your breath for 10 seconds, or as long as possible. While you're holding your breath, the piston will slowly fall to the base of the spirometer.  Once the piston reaches the bottom of the spirometer, breathe out slowly through your mouth. Rest for a few seconds.  Repeat 10 times. Try to get the piston to the same level with each breath.  Repeat every hour while awake  You can carefully clean the outside of the mouthpiece with an alcohol wipe or soap and water.       Patient and Family Education             Ways You Can Help Prevent Blood Clots                     This handout explains some simple things you can do to help prevent blood clots.      Blood clots are blockages that can form in the body's veins. When a blood clot forms in your deep veins, it may be called a deep vein thrombosis, or DVT for short. Blood clots can happen in any part of the body where blood flows, but they are most common in the arms and legs. If a piece of a blood clot breaks free and travels to the lungs, it is called a pulmonary embolus (PE). A PE can be a very serious problem.         Being in the hospital or having surgery can raise your chances of getting a blood clot because you may not be well enough to move around as much as you normally do.         Ways you can help prevent blood clots in the hospital           Wearing SCDs. SCDs stands for Sequential Compression Devices.   SCDs are special sleeves that wrap around your legs  They attach to a pump that fills them with air to gently squeeze your legs every few minutes.   This helps return the blood in your legs to your heart.   SCDs should only be taken off when walking or bathing.   SCDs may not be comfortable, but they can help save your life.                                            Wearing compression stockings - if your doctor orders them. These special snug fitting stockings gently squeeze your legs to help blood flow.       Walking. Walking helps move the blood in your legs.   If your doctor says it is ok, try walking the halls at least   5 times a day. Ask us to help you get up, so you don't fall.      Taking any blood thinning medicines your doctor orders.        Page 1 of 2            [facility]; 3/23   Ways you can help prevent blood clots at home         Wearing compression stockings - if your doctor orders them. ? Walking - to help move the blood in your legs.       Taking any blood thinning medicines your doctor orders.      Signs of a blood clot or PE        Tell your doctor or nurse know right  away if you have of the problems listed below.    If you are at home, seek medical care right away. Call 911 for chest pain or problems breathing.          Signs of a blood clot (DVT) - such as pain,  swelling, redness or warmth in your arm or leg      Signs of a pulmonary embolism (PE) - such as chest pain or feeling short of breath

## 2024-11-20 NOTE — CPM/PAT H&P
CPM/PAT Evaluation       Name: Ruba Woodard (Ruba Woodard)  /Age: 1970/54 y.o.     In-Person       Chief Complaint: left rotator cuff repair     Patient is an alert and oriented 54 year old male scheduled for a left shoulder arthroscopy, debridement on  with Dr. White. PMHX includes left breast cancer with radiation about 2 years ago, high cholesterol, hypertension, hypothyroidism not on medications, states that during covid she developed thyrotoxicosis and since they have been monitoring she has been just slightly hypo. Past surgical history includes left breast lumpectomy, right knee meniscus repair and tonsillectomy. Presents to [facility] PAT today for perioperative risk stratification and optimization.     Medication Documentation Review Audit       Reviewed by Janay Taylor RN (Registered Nurse) on 24 at 0955      Medication Order Taking? Sig Documenting Provider Last Dose Status   acetaminophen (Tylenol) 500 mg tablet [id number] Yes Take 2 tablets (1,000 mg) by mouth every 6 hours if needed. Historical Provider, MD 2024 Active   ascorbic acid, vitamin C, 500 mg capsule [id number] Yes Take 500 mg by mouth once daily. Historical Provider, MD 2024 Morning Active   atorvastatin (Lipitor) 40 mg tablet [id number] Yes Take 1 tablet (40 mg) by mouth once daily. Wally Crowe MD 2024 Evening Active   calcium carbonate-vitamin D3 500 mg-5 mcg (200 unit) tablet [id number] Yes Take 1 tablet by mouth once daily. Historical Provider, MD 2024 Morning Active   clindamycin (Cleocin T) 1 % lotion [id number] No Apply 1 Application topically once daily as needed. Historical Provider, MD Unknown Active   diclofenac sodium (Voltaren) 1 % gel gel [id number] Yes Apply sparingly to affected areas once daily Larisa Ontiveros, APRN-CNP Past Week Active   escitalopram (Lexapro) 10 mg tablet [id number] Yes Take 1 tablet (10 mg) by mouth once daily. Gregg Gonzalez DO 2024 Evening Active    ibuprofen 200 mg tablet [id number] Yes Take 1 tablet (200 mg) by mouth every 6 hours if needed. Historical Provider, MD Past Week Active   letrozole (Femara) 2.5 mg tablet [id number] Yes Take 1 tablet (2.5 mg total) by mouth once daily.  Take with or without food. Gomez Olivera MD 2024 Evening Active   lidocaine (Lidoderm) 5 % patch [id number] Yes PLACE 1 PATCH OVER EVERY 12 HOURS ON THE SKIN ONCE  DAILY. REMOVE AND DISCARD PATCH WITHIN 12 HOURS OR AS DIRECTED BY MD   Patient taking differently: once daily as needed.    MNAUEL Canchola Past Week Active   methocarbamol (Robaxin) 500 mg tablet [id number] No Take 1 tablet (500 mg) by mouth 3 times a day for 15 days.   Patient not taking: Reported on 2024    MANUEL Canchola More than a month  24 2359   multivitamin (Multiple Vitamins) tablet [id number] Yes Take by mouth once daily. Historical Provider, MD 2024 Morning Active   ondansetron ODT (Zofran-ODT) 4 mg disintegrating tablet [id number] No Take 1 tablet (4 mg) by mouth every 6 hours if needed for nausea or vomiting. Historical Provider, MD Unknown Active   tretinoin (Retin-A) 0.025 % cream [id number] Yes Apply nightly to bilateral shins Kyle Love MD 2024 Morning Active                      Review of Systems   Constitutional: Negative for chills, decreased appetite, diaphoresis, fever and malaise/fatigue.   Eyes:  Negative for blurred vision and double vision.   Cardiovascular:  Negative for chest pain, claudication, cyanosis, dyspnea on exertion, irregular heartbeat, leg swelling, near-syncope and palpitations.   Respiratory:  Negative for cough, hemoptysis, shortness of breath and wheezing.    Endocrine: Negative for cold intolerance, heat intolerance, polydipsia, polyphagia and polyuria.   Gastrointestinal:  Negative for abdominal pain, constipation, diarrhea, dysphagia, nausea and vomiting.   Genitourinary:  Negative for bladder incontinence, dysuria,  hematuria, incomplete emptying, nocturia, frequency, pelvic pain and urgency.   Neurological:  Negative for headaches, light-headedness, paresthesias, sensory change and weakness.   Psychiatric/Behavioral:  Negative for altered mental status.    Musculoskeletal: Negative for myalgias, arthralgias     Vitals and nursing note reviewed.     Physical exam  Constitutional:       Appearance: Normal appearance. She is Normal.   HENT:      Head: Normocephalic and atraumatic.      Mouth/Throat:      Mouth: Mucous membranes are moist.      Pharynx: Oropharynx is clear.   Eyes:      Extraocular Movements: Extraocular movements intact.      Conjunctiva/sclera: Conjunctivae normal.      Pupils: Pupils are equal, round, and reactive to light.   Cardiovascular:      PMI at left midclavicular line. Normal rate. Regular rhythm. Normal S1. Normal S2.       Murmurs: There is no murmur.      No gallop.  No click. No rub.       No audible carotid bruit     No lower extremity edema on exam  Pulmonary:      Effort: Pulmonary effort is normal.      Breath sounds: Normal breath sounds.   Abdominal:      General: Abdomen is flat. Bowel sounds are normal.      Palpations: Abdomen is soft and non-tender  Musculoskeletal:      Cervical back: Normal range of motion and neck supple.   Skin:     General: Skin is warm and dry.      Capillary Refill: Capillary refill takes less than 2 seconds.   Neurological:      General: No focal deficit present.      Mental Status: She is alert and oriented to person, place, and time. Mental status is at baseline.   Psychiatric:         Mood and Affect: Mood normal.         Behavior: Behavior normal.         Thought Content: Thought content normal.         Judgment: Judgment normal.     Vitals and nursing note reviewed. Physical exam within normal limits.     Assessment & Plan:    Neuro:  No diagnosis or significant findings on chart review or clinical presentation and evaluation.     HEENT/Airway:  No diagnosis  or significant findings on chart review or clinical presentation and evaluation.   STOP-BANG Score- 1 points lowrisk for OBED    Mallampati::  II    TM distance::  >3 FB    Neck ROM::  Full  Dentures-denies  Crowns-denies  Implants-denies    Cardiovascular:  No diagnosis or significant findings on chart review or clinical presentation and evaluation.   METS: 8.9  RCRI: 0 points, 3.9%  risk for postoperative MACE   DARRICK: 0.13% risk for postoperative MACE  EKG -normal EKG, normal sinus rhythm (7/22/24)    Pulmonary:  No diagnosis or significant findings on chart review or clinical presentation and evaluation.   ARISCAT: <26 points, 1.6% risk of in-hospital postoperative pulmonary complication  PRODIGY: Low risk for opioid induced respiratory depression  Smoking History-She has never smoked.  Discussed smoking cessation and deep breathing handout given    Renal/Urinary:  No diagnosis or significant findings on chart review or clinical presentation and evaluation, however, the patient is at increased risk of perioperative renal complications secondary to age>/= 54. Preventative measures include BP monitoring, medication compliance, and hydration management.   BMP-Pending    Endocrine:  No diagnosis or significant findings on chart review or clinical presentation and evaluation.     Hematologic/Immunology:  No diagnosis or significant findings on chart review or clinical presentation and evaluation.  The patient is not a Jehovah’s witness and will accept blood and blood products if medically indicated.   History of previous blood transfusions No  CBC-Pending  Caprini Score 4, patient at Moderate for postoperative DVT. Pt supplied education/VTE handout  Anticoagulation use: No     Gastrointestinal:   No diagnosis or significant findings on chart review or clinical presentation and evaluation.   Recreational drug use: Drug use No  Alcohol use none    Infectious disease:   No diagnosis or significant findings on chart review  or clinical presentation and evaluation.       Musculoskeletal:   No diagnosis or significant findings on chart review or clinical presentation and evaluation.   JHFRAT score- 0 points. low risk for falls    Anesthesia:  ASA 2 - Patient with mild systemic disease with no functional limitations  Anticipated anesthesia- consult  History of General anesthesia- no  Complications- No anesthesia complications  No family history of anesthesia complications    Abnormalities noted on PAT evaluation: No    Labs & Imaging ordered:  CBC, BMP  Nickel/metal allergy-negative  Shellfish allergy-negative    Overall, patient Low Risk for the scheduled Low Risk surgery. Discussed with patient medication instructions, NPO guidelines, and any questions or concerns.     Face to face time- 20 minutes

## 2024-11-21 ENCOUNTER — TELEPHONE (OUTPATIENT)
Dept: CARDIOLOGY | Facility: CLINIC | Age: 54
End: 2024-11-21
Payer: COMMERCIAL

## 2024-11-21 DIAGNOSIS — E78.2 MIXED HYPERLIPIDEMIA: ICD-10-CM

## 2024-11-21 LAB
CHOLEST SERPL-MCNC: 251 MG/DL (ref 0–199)
CHOLESTEROL/HDL RATIO: 2.6
HDLC SERPL-MCNC: 97.9 MG/DL
LDLC SERPL CALC-MCNC: 129 MG/DL
NON HDL CHOLESTEROL: 153 MG/DL (ref 0–149)
TRIGL SERPL-MCNC: 123 MG/DL (ref 0–149)
VLDL: 25 MG/DL (ref 0–40)

## 2024-11-21 NOTE — TELEPHONE ENCOUNTER
----- Message from Wally Crowe sent at 11/21/2024  7:32 AM EST -----  Lipids better on Repatha  ----- Message -----  From: Lab, Background User  Sent: 11/20/2024  11:02 AM EST  To: Wally Crowe MD

## 2024-11-25 ENCOUNTER — TELEPHONE (OUTPATIENT)
Dept: CARDIOLOGY | Facility: CLINIC | Age: 54
End: 2024-11-25
Payer: COMMERCIAL

## 2024-11-25 DIAGNOSIS — M25.512 CHRONIC LEFT SHOULDER PAIN: ICD-10-CM

## 2024-11-25 DIAGNOSIS — E78.2 MIXED HYPERLIPIDEMIA: ICD-10-CM

## 2024-11-25 DIAGNOSIS — G89.29 CHRONIC LEFT SHOULDER PAIN: ICD-10-CM

## 2024-11-26 RX ORDER — ATORVASTATIN CALCIUM 40 MG/1
40 TABLET, FILM COATED ORAL DAILY
Qty: 30 TABLET | Refills: 0 | Status: SHIPPED | OUTPATIENT
Start: 2024-11-26

## 2024-12-03 RX ORDER — LIDOCAINE 50 MG/G
PATCH TOPICAL
Qty: 90 PATCH | Refills: 0 | Status: SHIPPED | OUTPATIENT
Start: 2024-12-03

## 2024-12-12 ENCOUNTER — ANESTHESIA EVENT (OUTPATIENT)
Dept: OPERATING ROOM | Facility: HOSPITAL | Age: 54
End: 2024-12-12
Payer: COMMERCIAL

## 2024-12-12 ENCOUNTER — ANESTHESIA (OUTPATIENT)
Dept: OPERATING ROOM | Facility: HOSPITAL | Age: 54
End: 2024-12-12
Payer: COMMERCIAL

## 2024-12-12 ENCOUNTER — HOSPITAL ENCOUNTER (OUTPATIENT)
Facility: HOSPITAL | Age: 54
Setting detail: OUTPATIENT SURGERY
Discharge: HOME | End: 2024-12-12
Attending: ORTHOPAEDIC SURGERY | Admitting: ORTHOPAEDIC SURGERY
Payer: COMMERCIAL

## 2024-12-12 ENCOUNTER — APPOINTMENT (OUTPATIENT)
Dept: DERMATOLOGY | Facility: CLINIC | Age: 54
End: 2024-12-12
Payer: COMMERCIAL

## 2024-12-12 VITALS
RESPIRATION RATE: 16 BRPM | TEMPERATURE: 97.9 F | BODY MASS INDEX: 22.32 KG/M2 | WEIGHT: 138.89 LBS | HEART RATE: 76 BPM | DIASTOLIC BLOOD PRESSURE: 75 MMHG | SYSTOLIC BLOOD PRESSURE: 134 MMHG | HEIGHT: 66 IN | OXYGEN SATURATION: 98 %

## 2024-12-12 DIAGNOSIS — M75.122 COMPLETE TEAR OF LEFT ROTATOR CUFF, UNSPECIFIED WHETHER TRAUMATIC: Primary | ICD-10-CM

## 2024-12-12 DIAGNOSIS — Z98.890 STATUS POST ARTHROSCOPY OF LEFT SHOULDER: Primary | ICD-10-CM

## 2024-12-12 DIAGNOSIS — Z98.890 POST-OPERATIVE STATE: ICD-10-CM

## 2024-12-12 DIAGNOSIS — G89.18 ACUTE POST-OPERATIVE PAIN: Primary | ICD-10-CM

## 2024-12-12 PROCEDURE — 3700000001 HC GENERAL ANESTHESIA TIME - INITIAL BASE CHARGE: Performed by: ORTHOPAEDIC SURGERY

## 2024-12-12 PROCEDURE — C1713 ANCHOR/SCREW BN/BN,TIS/BN: HCPCS | Performed by: ORTHOPAEDIC SURGERY

## 2024-12-12 PROCEDURE — 2720000007 HC OR 272 NO HCPCS: Performed by: ORTHOPAEDIC SURGERY

## 2024-12-12 PROCEDURE — 29826 SHO ARTHRS SRG DECOMPRESSION: CPT | Performed by: ORTHOPAEDIC SURGERY

## 2024-12-12 PROCEDURE — 2500000005 HC RX 250 GENERAL PHARMACY W/O HCPCS: Performed by: ORTHOPAEDIC SURGERY

## 2024-12-12 PROCEDURE — 7100000010 HC PHASE TWO TIME - EACH INCREMENTAL 1 MINUTE: Performed by: ORTHOPAEDIC SURGERY

## 2024-12-12 PROCEDURE — 7100000001 HC RECOVERY ROOM TIME - INITIAL BASE CHARGE: Performed by: ORTHOPAEDIC SURGERY

## 2024-12-12 PROCEDURE — 7100000002 HC RECOVERY ROOM TIME - EACH INCREMENTAL 1 MINUTE: Performed by: ORTHOPAEDIC SURGERY

## 2024-12-12 PROCEDURE — 3700000002 HC GENERAL ANESTHESIA TIME - EACH INCREMENTAL 1 MINUTE: Performed by: ORTHOPAEDIC SURGERY

## 2024-12-12 PROCEDURE — A29828 PR ARTHROSCOPY SHOULDER SURGICAL BICEPS TENODESIS: Performed by: STUDENT IN AN ORGANIZED HEALTH CARE EDUCATION/TRAINING PROGRAM

## 2024-12-12 PROCEDURE — 2500000004 HC RX 250 GENERAL PHARMACY W/ HCPCS (ALT 636 FOR OP/ED)

## 2024-12-12 PROCEDURE — 29823 SHO ARTHRS SRG XTNSV DBRDMT: CPT | Performed by: NURSE PRACTITIONER

## 2024-12-12 PROCEDURE — 64415 NJX AA&/STRD BRCH PLXS IMG: CPT | Performed by: STUDENT IN AN ORGANIZED HEALTH CARE EDUCATION/TRAINING PROGRAM

## 2024-12-12 PROCEDURE — 2500000004 HC RX 250 GENERAL PHARMACY W/ HCPCS (ALT 636 FOR OP/ED): Performed by: STUDENT IN AN ORGANIZED HEALTH CARE EDUCATION/TRAINING PROGRAM

## 2024-12-12 PROCEDURE — 3600000004 HC OR TIME - INITIAL BASE CHARGE - PROCEDURE LEVEL FOUR: Performed by: ORTHOPAEDIC SURGERY

## 2024-12-12 PROCEDURE — 2780000003 HC OR 278 NO HCPCS: Performed by: ORTHOPAEDIC SURGERY

## 2024-12-12 PROCEDURE — 29823 SHO ARTHRS SRG XTNSV DBRDMT: CPT | Performed by: ORTHOPAEDIC SURGERY

## 2024-12-12 PROCEDURE — 2500000004 HC RX 250 GENERAL PHARMACY W/ HCPCS (ALT 636 FOR OP/ED): Performed by: NURSE ANESTHETIST, CERTIFIED REGISTERED

## 2024-12-12 PROCEDURE — A4649 SURGICAL SUPPLIES: HCPCS | Performed by: ORTHOPAEDIC SURGERY

## 2024-12-12 PROCEDURE — A29828 PR ARTHROSCOPY SHOULDER SURGICAL BICEPS TENODESIS: Performed by: NURSE ANESTHETIST, CERTIFIED REGISTERED

## 2024-12-12 PROCEDURE — 23430 REPAIR BICEPS TENDON: CPT | Performed by: ORTHOPAEDIC SURGERY

## 2024-12-12 PROCEDURE — 2500000004 HC RX 250 GENERAL PHARMACY W/ HCPCS (ALT 636 FOR OP/ED): Performed by: ORTHOPAEDIC SURGERY

## 2024-12-12 PROCEDURE — 7100000009 HC PHASE TWO TIME - INITIAL BASE CHARGE: Performed by: ORTHOPAEDIC SURGERY

## 2024-12-12 PROCEDURE — 3600000009 HC OR TIME - EACH INCREMENTAL 1 MINUTE - PROCEDURE LEVEL FOUR: Performed by: ORTHOPAEDIC SURGERY

## 2024-12-12 PROCEDURE — 23430 REPAIR BICEPS TENDON: CPT | Performed by: NURSE PRACTITIONER

## 2024-12-12 PROCEDURE — 2500000004 HC RX 250 GENERAL PHARMACY W/ HCPCS (ALT 636 FOR OP/ED): Mod: JZ | Performed by: NURSE PRACTITIONER

## 2024-12-12 PROCEDURE — 29826 SHO ARTHRS SRG DECOMPRESSION: CPT | Performed by: NURSE PRACTITIONER

## 2024-12-12 DEVICE — KIT, IMPLANT SYSTEM, PROXIMAL TENODESIS: Type: IMPLANTABLE DEVICE | Site: SHOULDER | Status: FUNCTIONAL

## 2024-12-12 RX ORDER — MIDAZOLAM HYDROCHLORIDE 1 MG/ML
2 INJECTION, SOLUTION INTRAMUSCULAR; INTRAVENOUS ONCE
Status: DISCONTINUED | OUTPATIENT
Start: 2024-12-12 | End: 2024-12-12 | Stop reason: HOSPADM

## 2024-12-12 RX ORDER — FENTANYL CITRATE 50 UG/ML
INJECTION, SOLUTION INTRAMUSCULAR; INTRAVENOUS AS NEEDED
Status: DISCONTINUED | OUTPATIENT
Start: 2024-12-12 | End: 2024-12-12

## 2024-12-12 RX ORDER — SODIUM CHLORIDE, SODIUM LACTATE, POTASSIUM CHLORIDE, CALCIUM CHLORIDE 600; 310; 30; 20 MG/100ML; MG/100ML; MG/100ML; MG/100ML
40 INJECTION, SOLUTION INTRAVENOUS CONTINUOUS
Status: DISCONTINUED | OUTPATIENT
Start: 2024-12-12 | End: 2024-12-12 | Stop reason: HOSPADM

## 2024-12-12 RX ORDER — ONDANSETRON HYDROCHLORIDE 2 MG/ML
INJECTION, SOLUTION INTRAVENOUS AS NEEDED
Status: DISCONTINUED | OUTPATIENT
Start: 2024-12-12 | End: 2024-12-12

## 2024-12-12 RX ORDER — MEPERIDINE HYDROCHLORIDE 25 MG/ML
12.5 INJECTION INTRAMUSCULAR; INTRAVENOUS; SUBCUTANEOUS EVERY 10 MIN PRN
Status: DISCONTINUED | OUTPATIENT
Start: 2024-12-12 | End: 2024-12-12 | Stop reason: HOSPADM

## 2024-12-12 RX ORDER — FENTANYL CITRATE 50 UG/ML
25 INJECTION, SOLUTION INTRAMUSCULAR; INTRAVENOUS EVERY 5 MIN PRN
Status: DISCONTINUED | OUTPATIENT
Start: 2024-12-12 | End: 2024-12-12 | Stop reason: HOSPADM

## 2024-12-12 RX ORDER — IPRATROPIUM BROMIDE 0.5 MG/2.5ML
500 SOLUTION RESPIRATORY (INHALATION) EVERY 30 MIN PRN
Status: DISCONTINUED | OUTPATIENT
Start: 2024-12-12 | End: 2024-12-12 | Stop reason: HOSPADM

## 2024-12-12 RX ORDER — KETOROLAC TROMETHAMINE 30 MG/ML
INJECTION, SOLUTION INTRAMUSCULAR; INTRAVENOUS AS NEEDED
Status: DISCONTINUED | OUTPATIENT
Start: 2024-12-12 | End: 2024-12-12

## 2024-12-12 RX ORDER — LABETALOL HYDROCHLORIDE 5 MG/ML
5 INJECTION, SOLUTION INTRAVENOUS EVERY 5 MIN PRN
Status: DISCONTINUED | OUTPATIENT
Start: 2024-12-12 | End: 2024-12-12 | Stop reason: HOSPADM

## 2024-12-12 RX ORDER — FENTANYL CITRATE 50 UG/ML
50 INJECTION, SOLUTION INTRAMUSCULAR; INTRAVENOUS ONCE
Status: COMPLETED | OUTPATIENT
Start: 2024-12-12 | End: 2024-12-12

## 2024-12-12 RX ORDER — MIDAZOLAM HYDROCHLORIDE 1 MG/ML
2 INJECTION, SOLUTION INTRAMUSCULAR; INTRAVENOUS ONCE
Status: COMPLETED | OUTPATIENT
Start: 2024-12-12 | End: 2024-12-12

## 2024-12-12 RX ORDER — ASPIRIN 81 MG/1
81 TABLET ORAL DAILY
Qty: 14 TABLET | Refills: 0 | Status: SHIPPED | OUTPATIENT
Start: 2024-12-12 | End: 2024-12-26

## 2024-12-12 RX ORDER — CEFAZOLIN SODIUM 2 G/100ML
2 INJECTION, SOLUTION INTRAVENOUS ONCE
Status: COMPLETED | OUTPATIENT
Start: 2024-12-12 | End: 2024-12-12

## 2024-12-12 RX ORDER — ALBUTEROL SULFATE 0.83 MG/ML
2.5 SOLUTION RESPIRATORY (INHALATION) EVERY 30 MIN PRN
Status: DISCONTINUED | OUTPATIENT
Start: 2024-12-12 | End: 2024-12-12 | Stop reason: HOSPADM

## 2024-12-12 RX ORDER — OMEPRAZOLE 40 MG/1
40 CAPSULE, DELAYED RELEASE ORAL
Qty: 3 CAPSULE | Refills: 0 | Status: SHIPPED | OUTPATIENT
Start: 2024-12-12 | End: 2024-12-15

## 2024-12-12 RX ORDER — LIDOCAINE HYDROCHLORIDE 10 MG/ML
INJECTION, SOLUTION EPIDURAL; INFILTRATION; INTRACAUDAL; PERINEURAL AS NEEDED
Status: DISCONTINUED | OUTPATIENT
Start: 2024-12-12 | End: 2024-12-12

## 2024-12-12 RX ORDER — KETOROLAC TROMETHAMINE 10 MG/1
10 TABLET, FILM COATED ORAL EVERY 6 HOURS PRN
Qty: 12 TABLET | Refills: 0 | Status: SHIPPED | OUTPATIENT
Start: 2024-12-12 | End: 2024-12-15

## 2024-12-12 RX ORDER — BUPIVACAINE HYDROCHLORIDE 5 MG/ML
INJECTION, SOLUTION PERINEURAL AS NEEDED
Status: DISCONTINUED | OUTPATIENT
Start: 2024-12-12 | End: 2024-12-12 | Stop reason: HOSPADM

## 2024-12-12 RX ORDER — SODIUM CHLORIDE, SODIUM LACTATE, POTASSIUM CHLORIDE, CALCIUM CHLORIDE 600; 310; 30; 20 MG/100ML; MG/100ML; MG/100ML; MG/100ML
50 INJECTION, SOLUTION INTRAVENOUS CONTINUOUS
Status: DISCONTINUED | OUTPATIENT
Start: 2024-12-12 | End: 2024-12-12 | Stop reason: HOSPADM

## 2024-12-12 RX ORDER — DOCUSATE SODIUM 100 MG/1
100 CAPSULE, LIQUID FILLED ORAL 2 TIMES DAILY
Qty: 30 CAPSULE | Refills: 0 | Status: SHIPPED | OUTPATIENT
Start: 2024-12-12 | End: 2024-12-27

## 2024-12-12 RX ORDER — ONDANSETRON HYDROCHLORIDE 2 MG/ML
4 INJECTION, SOLUTION INTRAVENOUS ONCE AS NEEDED
Status: DISCONTINUED | OUTPATIENT
Start: 2024-12-12 | End: 2024-12-12 | Stop reason: HOSPADM

## 2024-12-12 RX ORDER — OXYCODONE AND ACETAMINOPHEN 5; 325 MG/1; MG/1
1 TABLET ORAL EVERY 6 HOURS PRN
Qty: 24 TABLET | Refills: 0 | Status: SHIPPED | OUTPATIENT
Start: 2024-12-12 | End: 2024-12-18

## 2024-12-12 RX ORDER — FENTANYL CITRATE 50 UG/ML
50 INJECTION, SOLUTION INTRAMUSCULAR; INTRAVENOUS EVERY 5 MIN PRN
Status: DISCONTINUED | OUTPATIENT
Start: 2024-12-12 | End: 2024-12-12 | Stop reason: HOSPADM

## 2024-12-12 RX ORDER — PROPOFOL 10 MG/ML
INJECTION, EMULSION INTRAVENOUS AS NEEDED
Status: DISCONTINUED | OUTPATIENT
Start: 2024-12-12 | End: 2024-12-12

## 2024-12-12 SDOH — HEALTH STABILITY: MENTAL HEALTH: CURRENT SMOKER: 0

## 2024-12-12 ASSESSMENT — PAIN - FUNCTIONAL ASSESSMENT
PAIN_FUNCTIONAL_ASSESSMENT: WONG-BAKER FACES
PAIN_FUNCTIONAL_ASSESSMENT: WONG-BAKER FACES
PAIN_FUNCTIONAL_ASSESSMENT: 0-10
PAIN_FUNCTIONAL_ASSESSMENT: 0-10
PAIN_FUNCTIONAL_ASSESSMENT: WONG-BAKER FACES
PAIN_FUNCTIONAL_ASSESSMENT: 0-10

## 2024-12-12 ASSESSMENT — PAIN SCALES - GENERAL
PAINLEVEL_OUTOF10: 0 - NO PAIN
PAINLEVEL_OUTOF10: 2
PAINLEVEL_OUTOF10: 0 - NO PAIN
PAINLEVEL_OUTOF10: 0 - NO PAIN
PAINLEVEL_OUTOF10: 2

## 2024-12-12 ASSESSMENT — COLUMBIA-SUICIDE SEVERITY RATING SCALE - C-SSRS
6. HAVE YOU EVER DONE ANYTHING, STARTED TO DO ANYTHING, OR PREPARED TO DO ANYTHING TO END YOUR LIFE?: NO
2. HAVE YOU ACTUALLY HAD ANY THOUGHTS OF KILLING YOURSELF?: NO
2. HAVE YOU ACTUALLY HAD ANY THOUGHTS OF KILLING YOURSELF?: NO
1. IN THE PAST MONTH, HAVE YOU WISHED YOU WERE DEAD OR WISHED YOU COULD GO TO SLEEP AND NOT WAKE UP?: NO
6. HAVE YOU EVER DONE ANYTHING, STARTED TO DO ANYTHING, OR PREPARED TO DO ANYTHING TO END YOUR LIFE?: NO
1. IN THE PAST MONTH, HAVE YOU WISHED YOU WERE DEAD OR WISHED YOU COULD GO TO SLEEP AND NOT WAKE UP?: NO

## 2024-12-12 ASSESSMENT — PAIN DESCRIPTION - DESCRIPTORS: DESCRIPTORS: DULL;DISCOMFORT

## 2024-12-12 NOTE — ANESTHESIA PREPROCEDURE EVALUATION
Patient: Ruba Woodard    Procedure Information       Date/Time: 12/12/24 1050    Procedure: Left shoulder arthroscopic decompression, debridement, possible rotator cuff repair with possible biceps tenodesis. (ARTHREX CAMERA, DEPUY (pump), MCCORD & NEPHEW (arthroscopic cautery (WEREWOLF) and shaver),ARTHREX ANCHORS, SMITH & NEPHEW (Regeneten), DEPUY (Dermis on Demand), Aveumed) (Left: Shoulder)    Location: [facility] / [facility]    Surgeons: Kyle White MD          Past Medical History:   Diagnosis Date    Contact with and (suspected) exposure to potentially hazardous body fluids 12/19/2019    Accidental hypodermic needlestick injury with exposure to body fluid    Hyperlipidemia     Parageusia 07/16/2020    Loss of taste    Personal history of malignant neoplasm of breast 12/16/2022    History of malignant neoplasm of left breast    Personal history of other endocrine, nutritional and metabolic disease 10/07/2020    History of thyrotoxicosis     Past Surgical History:   Procedure Laterality Date    ADENOIDECTOMY  11/12/2013    Adenoidectomy    ANKLE ARTHROSCOPY W/ OPEN REPAIR  11/12/2013    Ankle Repair    BI MAMMO GUIDED BREAST RIGHT LOCALIZATION Right 02/20/2020    BI MAMMO GUIDED LOCALIZATION BREAST RIGHT 2/20/2020 [facility]    BREAST BIOPSY Right 11/21/2022    Benign right core biopsy    BREAST LUMPECTOMY Left 12/14/2022    left Lumpectomy with Radiation 12/14/22    OTHER SURGICAL HISTORY  04/21/2021    Tonsillectomy    OTHER SURGICAL HISTORY  04/21/2021    Knee surgery    OTHER SURGICAL HISTORY  04/21/2021    Bunionectomy    OTHER SURGICAL HISTORY  04/21/2021    Breast surgery    OTHER SURGICAL HISTORY  04/21/2021    Endometrial ablation    TONSILLECTOMY  11/12/2013    Tonsillectomy       Relevant Problems   Anesthesia (within normal limits)      Cardiac   (+) Hyperlipidemia      Neuro   (+) Bilateral occipital neuralgia   (+) Nightmares   (+) PTSD (post-traumatic stress disorder)       Endocrine   (+) Hypothyroidism      Musculoskeletal   (+) Localized primary osteoarthritis of carpometacarpal joint of left thumb   (+) Primary osteoarthritis of both knees      Skin   (+) Dyshidrosis (pompholyx)   (+) Squamous cell carcinoma of skin of left upper limb, including shoulder      GYN   (+) Malignant neoplasm of upper-outer quadrant of left breast in female, estrogen receptor positive       Clinical information reviewed:    Allergies  Meds               NPO Detail:  NPO/Void Status  Date of Last Liquid: 12/12/24  Time of Last Liquid: 0700 (few oz water)  Date of Last Solid: 12/11/24  Time of Last Solid: 2000  Last Intake Type: Light meal  Time of Last Void: 0959         Physical Exam    Airway  Mallampati: II  TM distance: >3 FB  Neck ROM: full     Cardiovascular   Comments: deferred   Dental   Comments: No loose teeth   Pulmonary   Comments: deferred   Abdominal     Comments: deferred           Anesthesia Plan    History of general anesthesia?: yes  History of complications of general anesthesia?: no    ASA 3     general and regional     The patient is not a current smoker.  Patient was not previously instructed to abstain from smoking on day of procedure.  Patient did not smoke on day of procedure.  Education provided regarding risk of obstructive sleep apnea.  intravenous induction   Postoperative administration of opioids is intended.  Anesthetic plan and risks discussed with patient.  Use of blood products discussed with patient who consented to blood products.    Plan discussed with CRNA and CAA.

## 2024-12-12 NOTE — NURSING NOTE
Patient finished with care in phase 2, waiting for  to arrive to go over instructions and then the doctor would like to speak with them  to update once  arrives.

## 2024-12-12 NOTE — ANESTHESIA PROCEDURE NOTES
Peripheral Block    Patient location during procedure: pre-op  Start time: 12/12/2024 10:30 AM  End time: 12/12/2024 10:31 AM  Reason for block: at surgeon's request and post-op pain management  Staffing  Performed: attending   Authorized by: Douglas Lawrence DO    Performed by: Douglas Lawrence DO  Preanesthetic Checklist  Completed: patient identified, IV checked, site marked, risks and benefits discussed, surgical consent, monitors and equipment checked, pre-op evaluation and timeout performed   Timeout performed at: 12/12/2024 10:23 AM  Peripheral Block  Patient position: sitting  Prep: ChloraPrep  Patient monitoring: heart rate, cardiac monitor and continuous pulse ox  Block type: interscalene  Laterality: left  Injection technique: single-shot  Guidance: ultrasound guided  Local infiltration: ropivacaine  Infiltration strength: 0.5 %  Dose: 20 mL  Needle  Needle gauge: 22 G  Needle length: 5 cm  Needle localization: ultrasound guidance  Assessment  Injection assessment: negative aspiration for heme, no paresthesia on injection, incremental injection and local visualized surrounding nerve on ultrasound  Paresthesia pain: none  Heart rate change: no  Slow fractionated injection: yes  Additional Notes  With 4mg Decadron

## 2024-12-12 NOTE — ANESTHESIA POSTPROCEDURE EVALUATION
Patient: Ruba Woodard    Procedure Summary       Date: 12/12/24 Room / Location: [facility]    Anesthesia Start: 1055 Anesthesia Stop: 1222    Procedure: Left shoulder arthroscopic decompression, debridement, biceps tenodesis. (ARTHREX CAMERA, DEPUY (pump), MCCORD & NEPHEW (arthroscopic cautery (WEREWOLF) and shaver),ARTHREX ANCHORS, SMITH & NEPHEW (Regeneten), DEPUY (Dermis on Demand), Aveumed) (Left: Shoulder) Diagnosis:       Complete tear of left rotator cuff, unspecified whether traumatic      (Complete tear of left rotator cuff, unspecified whether traumatic [M75.122])    Surgeons: Kyle White MD Responsible Provider: Douglas Lawrence DO    Anesthesia Type: general, regional ASA Status: 3            Anesthesia Type: general, regional    Vitals Value Taken Time   /65 12/12/24 1230   Temp 36.2 °C (97.2 °F) 12/12/24 1218   Pulse 68 12/12/24 1230   Resp 16 12/12/24 1230   SpO2 95 % 12/12/24 1230       Anesthesia Post Evaluation    Patient location during evaluation: bedside  Patient participation: complete - patient participated  Level of consciousness: awake and alert  Pain management: adequate  Multimodal analgesia pain management approach  Airway patency: patent  Two or more strategies used to mitigate risk of obstructive sleep apnea  Cardiovascular status: acceptable  Respiratory status: acceptable  Hydration status: acceptable  Postoperative Nausea and Vomiting: none        No notable events documented.

## 2024-12-12 NOTE — ANESTHESIA PROCEDURE NOTES
Airway  Date/Time: 12/12/2024 11:01 AM  Urgency: elective    Airway not difficult    Staffing  Performed: CRNA   Authorized by: Douglas Lawrence DO    Performed by: ELMA Ross  Patient location during procedure: OR    Indications and Patient Condition  Indications for airway management: anesthesia  Spontaneous ventilation: present  Sedation level: deep  Preoxygenated: yes  Patient position: sniffing  MILS maintained throughout  Mask difficulty assessment: 1 - vent by mask    Final Airway Details  Final airway type: supraglottic airway      Successful airway: Size 3     Number of attempts at approach: 1

## 2024-12-12 NOTE — NURSING NOTE
Timeout performed bedside with Dr. Lawrence at 1023  Left shoulder correct site, marked by Dr. White  Block initiated at 1030  Block ended at 1031, patient tolerated well.

## 2024-12-12 NOTE — OP NOTE
Left shoulder arthroscopic decompression, debridement, biceps tenodesis. (ARTHREX CAMERA, DEPUY (pump), MCCORD & NEPHEW (arthroscopic cautery (WEREWOLF) and shaver),ARTHREX ANCHORS, SMITH & NEPHEW (Regeneten), DEPUY (Dermis on Demand), Aveumed) (L) Operative Note     Date: 2024  OR Location: [facility]    Name: Ruba Woodard, : 1970, Age: 54 y.o., MRN: [id number], Sex: female    Diagnosis  Pre-op Diagnosis      * Complete tear of left rotator cuff, unspecified whether traumatic [M75.122] Post-op Diagnosis     * Complete tear of left rotator cuff, unspecified whether traumatic [M75.122]     Procedures  Left shoulder arthroscopic decompression, debridement, biceps tenodesis. (ARTHREX CAMERA, DEPUY (pump), MCCORD & NEPHEW (arthroscopic cautery (WEREWOLF) and shaver),ARTHREX ANCHORS, SMITH & NEPHEW (Regeneten), DEPUY (Dermis on Demand), Aveumed)  [address] - [facility] DIAGNOSTIC ARTHROSCOPY SHOULDER +- SYNOVIAL BX      Surgeons      * Kyle White - Primary    Resident/Fellow/Other Assistant:  Surgeons and Role:     * LAUREN Canchola-CNP - PROMISE First Assist    Staff:   Circulator: Marine  Scrub Person: Celestino Pablo Circulator: Nahomy    Anesthesia Staff: Anesthesiologist: Douglas Lawrence DO  CRNA: LAUREN Ross-CRNA    Procedure Summary  Anesthesia: Regional, General  ASA: III  Estimated Blood Loss: 20mL  Intra-op Medications:   Administrations occurring from 1050 to 1235 on 24:   Medication Name Total Dose   EPINEPHrine (Adrenalin) 1 mg/mL 1 mg in sodium chloride 0.9 % 3,000 mL irrigation 2 mL   BUPivacaine HCl (Marcaine) 0.5 % (5 mg/mL) injection 10 mL   dexAMETHasone (Decadron) injection 4 mg/mL 4 mg   fentaNYL (Sublimaze) injection 50 mcg/mL 100 mcg   ketorolac (Toradol) injection 30 mg 15 mg   lactated Ringer's infusion 208.33 mL   lidocaine PF (Xylocaine-MPF) local injection 1 % 5 mL   ondansetron (Zofran) 2 mg/mL injection 4 mg   propofol (Diprivan) injection 10 mg/mL 200 mg   ceFAZolin  (Ancef) 2 g in dextrose (iso)  mL 2 g              Anesthesia Record               Intraprocedure I/O Totals       None           Specimen: No specimens collected              Drains and/or Catheters: * None in log *    Tourniquet Times:         Implants:  Implants       Type Name Action Serial No.      Implant KIT, IMPLANT SYSTEM, PROXIMAL TENODESIS - [id number] Implanted               Findings: Consistent with diagnosis patient had undersurface tear of the rotator cuff which was extensively debrided longitudinal tearing the biceps superior labral tearing large subacromial spur and significant bursitis in the subacromial space    Indications: Ruba Woodard is an 54 y.o. female who is having surgery for Complete tear of left rotator cuff, unspecified whether traumatic [M75.122].  Long head of the biceps injury, superior labral degenerative tearing, subacromial bursitis    The patient was seen in the preoperative area. The risks, benefits, complications, treatment options, non-operative alternatives, expected recovery and outcomes were discussed with the patient. The possibilities of reaction to medication, pulmonary aspiration, injury to surrounding structures, bleeding, recurrent infection, the need for additional procedures, failure to diagnose a condition, and creating a complication requiring transfusion or operation were discussed with the patient. The patient concurred with the proposed plan, giving informed consent.  The site of surgery was properly noted/marked if necessary per policy. The patient has been actively warmed in preoperative area. Preoperative antibiotics have been ordered and given within 1 hours of incision. Venous thrombosis prophylaxis have been ordered including bilateral sequential compression devices    Procedure Details:    FINDINGS:    Consistent with diagnosis. labral degenerative tear, subacromial spur    BRIEF HISTORY:    This patient failed conservative management for the  shoulder. This included therapy, anti-inflammatories, home exercises, and injections.  Advance imaging showed no signs of a full-thickness rotator cuff tear .  Risks, benefits, and alternatives of surgery including no improvement in pain, infection, bleeding, and nerve injury were all discussed, and they wished to proceed.    OPERATIVE REPORT:    On the day of surgery, they were seen in the preop holding area, identified as the correct patient.  The shoulder was identified and marked as the correct operative site.  Interscalene nerve block was performed after consent was signed.  they were taken to the operating room.  Huddle ensued ensuring that they were the correct patient, and the proper shoulder was marked and identified.  Antibiotics were given appropriately.  Intubated and sedated, positioned in 90-degree beach chair position.  All bony prominences were well padded.  Head was held in neutral position by a [employer] head quiroz.  We prepped and draped in standard fashion.  We paused to ensure that she was the correct patient.  We then made a standard posterior portal.  Anterior medial portal was made through the rotator interval.  We did extensive debridement of anterior, superior, posterior labrum as well as undersurface of the rotator cuff and glenoid cartilage.  The rotator interval did not need to be released completely secondary to the exam under anesthesia.  We released the biceps tendon as they had longitudinal tearing of the biceps tendon as well as superior labral tear.  We did an extensive debridement on the undersurface of the rotator cuff she had a small amount of calcification in the rotator cuff which was removed from the articular side.  The footprint was intact.  We did ajay this area so we could examine it further in the subacromial space.  We then went to the subacromial space, had extensive bursectomy.  No signs of any full-thickness tearing.  We used a rasp from the lateral portal to remove the  subacromial spur type 2.  Good range of motion, rotator cuff intact.  Including the area where we had previously marked from the articular side with a suture.  It should be noted that the subacromial bursitis was significant and was removed.  We then turned our attention back to the biceps portion of the case.  An incision was made in the axilla region.  We retracted the pectoralis major laterally, conjoined tendon medially, identified the long head of the biceps, and placed a whipstitch at the musculotendinous junction.  I removed 6 cm of tendon, longitudinal tears and synovivitis.  Then, the unicortical drill, copiously irrigated the wound, flipped the unicortical Arthrex button.  Tensioned down using a tension slide technique, and another stitch was placed through the tendon for increased strength.  We copiously irrigated the wound.  2-0 Vicryl, running Monocryl, and Dermabond completed the closure.  A local medication was used.  Nylon sutures were used to close the portal incisions.  The patient was placed in a sling, awoken from anesthesia, transferred to PACU, where they were recovering.  I was present and scrubbed for all critical portions of the procedure.  Sharp and sponge counts were correct at the end of the case.  Please note that we will be billing for my nurse practitioner's first assist as she was critical in the center for successful completion of the case including positioning of the body, retraction, suture management, placement of the implants and wound closure. There was no qualified resident available for the entire case    Complications:  None; patient tolerated the procedure well.    Disposition: PACU - hemodynamically stable.  Condition: stable             Task Performed by [facility]OM[facility] First Assist or Physician Assistant:   Weston BELTRAN/ALFONZO, was necessary to assist on this case due to the nature of the case and difficulty. During the case she served as my assist by patient positioning retraction  arthroscopy set up biceps tenodesis and wound closure      Additional Details: None    Attending Attestation: I was present and scrubbed for the key portions of the procedure.    Kyle White  Phone Number: [phone]

## 2024-12-12 NOTE — PERIOPERATIVE NURSING NOTE
Patient alert and oriented. Surgical site clean, dry and intact with iceman applied and running. Patient denies pain at this time. Tolerating sips of fluids with no complaints of nausea/vomiting. VS stable.

## 2024-12-13 DIAGNOSIS — M25.512 CHRONIC PAIN OF BOTH SHOULDERS: ICD-10-CM

## 2024-12-13 DIAGNOSIS — G89.29 CHRONIC PAIN OF BOTH SHOULDERS: ICD-10-CM

## 2024-12-13 DIAGNOSIS — M25.511 CHRONIC PAIN OF BOTH SHOULDERS: ICD-10-CM

## 2024-12-13 RX ORDER — DICLOFENAC SODIUM 10 MG/G
GEL TOPICAL
Qty: 100 G | Refills: 0 | Status: SHIPPED | OUTPATIENT
Start: 2024-12-13

## 2024-12-23 ENCOUNTER — APPOINTMENT (OUTPATIENT)
Dept: ORTHOPEDIC SURGERY | Facility: CLINIC | Age: 54
End: 2024-12-23
Payer: COMMERCIAL

## 2024-12-23 DIAGNOSIS — Z98.890 STATUS POST ARTHROSCOPY OF LEFT SHOULDER: Primary | ICD-10-CM

## 2024-12-23 PROCEDURE — 1036F TOBACCO NON-USER: CPT | Performed by: NURSE PRACTITIONER

## 2024-12-23 PROCEDURE — 99024 POSTOP FOLLOW-UP VISIT: CPT | Performed by: NURSE PRACTITIONER

## 2024-12-23 NOTE — PROGRESS NOTES
Provider Impression/Assessment:  Ruba Woodard is 2 weeks status post left shoulder arthroscopic debridement, decompression and open biceps tenodesis. This is a Workers [employer] injury.     Patient Discussion/Plan:  They may discard sling at this time and will initiate formal physical therapy now for range of motion. Prescription for therapy was given today as well as  locations for therapy. She is already schedule dfor therapy. They can do range of motion exercises as tolerated now and continue doing elbow and wrist range of motion exercises that have been discussed today. These exercises should be done 3-5 times a day as tolerated. We talked about scapular stabilizing exercises that they can do right now as well. No torquing motions. No opening or closing doors. No lifting more than a coffee cup. Sling is to be worn for comfort only after 2 weeks from surgery. She can also start to do some passive range of motion exercises with her shoulder. These were discussed with patient today, along with description of exercises.     She will remain off work until 2/21/25 due to restrictions of her left shoulder. Upon her initial return to work, she will have limitations of lifting no more than 5 pounds, at waist level only. No overhead lift with her left shoulder.     We will see Ruba Woodard back in 6 weeks to repeat clinical check and will likely progress to strengthening of therapy at that time. All questions were answered today to the patient's satisfaction and they are comfortable with the above plan.     Should you have any questions or concerns after your visit today, please do not hesitate to call the office at [phone]. We strive to give you high-quality, patient-centered, collaborative care and I am honored to be a part of your health care team.    -------------------------------------------------------------------------------------------------  CHIEF COMPLAINT:  POV 1 - [facility]  Left shoulder arthroscopic  decompression, debridement and biceps tenodesis.   DOS 12/12/24    History of Present Illness:  Ruba Woodard is a pleasant 54 y.o. female who returns to clinic for the first postoperative visit. They are status post left shoulder arthroscopic decompression, debridement and biceps tenodesis completed on  12/12/24      Ruba Woodard reports doing well overall after surgery. Pain is well managed. Denies use of narcotic pain medication at this time. Over-the-counter pain medication only as needed. Using ice machine daily. Sleeping with minor difficulty. Denies redness or warmth at incision site. Denies any fever, chills, or paresthesia. They have been compliant with restrictions. Presents today wearing their shoulder sling. Doing well with daily home therapy for passive shoulder exercises and active elbow, wrist and hand exercises. She has no new concerns today. Her right shoulder is feeling relief from recent injection.     Review of Systems:   Review of systems for all 14 systems is negative for complaint except for the above noted findings in the history of present illness.    Family, social, and medical histories are obtained and reviewed.    Allergies:  Allergies   Allergen Reactions    Hydrocodone Unknown    Scopolamine Other     Pain  stiffness    Morphine Other       Medications:    Current Outpatient Medications:     acetaminophen (Tylenol) 500 mg tablet, Take 2 tablets (1,000 mg) by mouth every 6 hours if needed., Disp: , Rfl:     ascorbic acid, vitamin C, 500 mg capsule, Take 500 mg by mouth once daily., Disp: , Rfl:     aspirin 81 mg EC tablet, Take 1 tablet (81 mg) by mouth once daily for 14 days., Disp: 14 tablet, Rfl: 0    atorvastatin (Lipitor) 40 mg tablet, Take 1 tablet (40 mg) by mouth once daily., Disp: 30 tablet, Rfl: 0    calcium carbonate-vitamin D3 500 mg-5 mcg (200 unit) tablet, Take 1 tablet by mouth once daily., Disp: , Rfl:     clindamycin (Cleocin T) 1 % lotion, Apply 1 Application topically  once daily as needed., Disp: , Rfl:     diclofenac sodium (Arthritis Pain, diclofenac,) 1 % gel, APPLY SPARINGLY TO AFFECTED AREAS ONCE DAILY, Disp: 100 g, Rfl: 0    docusate sodium (Colace) 100 mg capsule, Take 1 capsule (100 mg) by mouth 2 times a day for 15 days., Disp: 30 capsule, Rfl: 0    escitalopram (Lexapro) 10 mg tablet, Take 1 tablet (10 mg) by mouth once daily., Disp: 90 tablet, Rfl: 1    letrozole (Femara) 2.5 mg tablet, Take 1 tablet (2.5 mg total) by mouth once daily.  Take with or without food., Disp: 90 tablet, Rfl: 2    lidocaine (Lidoderm) 5 % patch, PLACE 1 PATCH OVER 12 HOURS ON THE SKIN ONCE DAILY. REMOVE AND DISCARD PATCH WITHIN 12 HOURS OR AS DIRECTED BY MD, Disp: 90 patch, Rfl: 0    multivitamin (Multiple Vitamins) tablet, Take by mouth once daily., Disp: , Rfl:     omeprazole (PriLOSEC) 40 mg DR capsule, Take 1 capsule (40 mg) by mouth once daily in the morning. Take before meals for 3 days. Do not crush or chew., Disp: 3 capsule, Rfl: 0    ondansetron ODT (Zofran-ODT) 4 mg disintegrating tablet, Dissolve 1 tablet (4 mg) in the mouth every 6 hours if needed for nausea or vomiting., Disp: , Rfl:     tretinoin (Retin-A) 0.025 % cream, Apply nightly to bilateral shins, Disp: 60 g, Rfl: 3    Diagnoses/Problems:  Left biceps tendinitis   Left shoulder bursitis    Physical Examination:  Ruba Woodard is a well-developed well-nourished female in no acute distress. Breathes with normal chest rises. Eye exam reveals round pupils. Awake alert and oriented x3.     Patient portal Left shoulder incisions and axilla incision are dry, intact and healing well. Minimal swelling. No warmth or erythema. Mild ecchymosis. Sutures were removed today and steris strips placed on incision sites on top of shoulder. Suture tails cut on axilla incision. Surgical glue intact. Neurovascularly intact distally. 5 out of 5 wrist, hand and thumb flexion and extension without pain. Active range of motion of elbow  approximately , passively full motion.      Results/Imaging:  I personally spent time viewing digital images today with the patient that were taken intraoperatively.     *While intending to generate a timely document that accurately reflects the content of the visit, no guarantee can be provided that every grammatical or spelling mistake has been or will be identified or corrected. Thank you for your understanding.

## 2024-12-24 ENCOUNTER — APPOINTMENT (OUTPATIENT)
Dept: RHEUMATOLOGY | Facility: CLINIC | Age: 54
End: 2024-12-24
Payer: COMMERCIAL

## 2024-12-27 RX ORDER — LIDOCAINE HYDROCHLORIDE 10 MG/ML
4 INJECTION, SOLUTION EPIDURAL; INFILTRATION; INTRACAUDAL; PERINEURAL
Status: COMPLETED | OUTPATIENT
Start: 2024-12-27 | End: 2024-12-27

## 2024-12-27 RX ORDER — TRIAMCINOLONE ACETONIDE 40 MG/ML
80 INJECTION, SUSPENSION INTRA-ARTICULAR; INTRAMUSCULAR
Status: COMPLETED | OUTPATIENT
Start: 2024-12-27 | End: 2024-12-27

## 2024-12-27 RX ORDER — ROPIVACAINE HYDROCHLORIDE 5 MG/ML
4 INJECTION, SOLUTION EPIDURAL; INFILTRATION; PERINEURAL
Status: COMPLETED | OUTPATIENT
Start: 2024-12-27 | End: 2024-12-27

## 2025-01-06 ENCOUNTER — APPOINTMENT (OUTPATIENT)
Dept: PRIMARY CARE | Facility: CLINIC | Age: 55
End: 2025-01-06
Payer: COMMERCIAL

## 2025-01-06 VITALS
DIASTOLIC BLOOD PRESSURE: 70 MMHG | WEIGHT: 142 LBS | HEART RATE: 62 BPM | BODY MASS INDEX: 22.82 KG/M2 | SYSTOLIC BLOOD PRESSURE: 122 MMHG | HEIGHT: 66 IN

## 2025-01-06 DIAGNOSIS — E78.2 MIXED HYPERLIPIDEMIA: ICD-10-CM

## 2025-01-06 DIAGNOSIS — F43.10 PTSD (POST-TRAUMATIC STRESS DISORDER): ICD-10-CM

## 2025-01-06 DIAGNOSIS — Z00.00 ANNUAL PHYSICAL EXAM: Primary | ICD-10-CM

## 2025-01-06 DIAGNOSIS — Z98.890 STATUS POST SHOULDER SURGERY: ICD-10-CM

## 2025-01-06 LAB
POC APPEARANCE, URINE: CLEAR
POC BILIRUBIN, URINE: NEGATIVE
POC BLOOD, URINE: NEGATIVE
POC COLOR, URINE: YELLOW
POC GLUCOSE, URINE: NEGATIVE MG/DL
POC KETONES, URINE: NEGATIVE MG/DL
POC LEUKOCYTES, URINE: NEGATIVE
POC NITRITE,URINE: NEGATIVE
POC PH, URINE: 7 PH
POC PROTEIN, URINE: NEGATIVE MG/DL
POC SPECIFIC GRAVITY, URINE: 1.01
POC UROBILINOGEN, URINE: 1 EU/DL

## 2025-01-06 PROCEDURE — 99214 OFFICE O/P EST MOD 30 MIN: CPT | Performed by: FAMILY MEDICINE

## 2025-01-06 PROCEDURE — 81002 URINALYSIS NONAUTO W/O SCOPE: CPT | Performed by: FAMILY MEDICINE

## 2025-01-06 PROCEDURE — 99396 PREV VISIT EST AGE 40-64: CPT | Performed by: FAMILY MEDICINE

## 2025-01-06 PROCEDURE — 3008F BODY MASS INDEX DOCD: CPT | Performed by: FAMILY MEDICINE

## 2025-01-06 PROCEDURE — 1036F TOBACCO NON-USER: CPT | Performed by: FAMILY MEDICINE

## 2025-01-06 RX ORDER — ATORVASTATIN CALCIUM 40 MG/1
40 TABLET, FILM COATED ORAL DAILY
Qty: 90 TABLET | Refills: 1 | Status: SHIPPED | OUTPATIENT
Start: 2025-01-06 | End: 2025-07-05

## 2025-01-06 RX ORDER — ESCITALOPRAM OXALATE 10 MG/1
10 TABLET ORAL DAILY
Qty: 90 TABLET | Refills: 1 | Status: SHIPPED | OUTPATIENT
Start: 2025-01-06 | End: 2025-07-05

## 2025-01-06 NOTE — ASSESSMENT & PLAN NOTE
Complete history and physical examination was performed  We will notify of test results once available  Highly encourage pneumonia, flu, and shingles vaccines which were declined in the office today
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
## 2025-01-07 ENCOUNTER — LAB (OUTPATIENT)
Dept: LAB | Facility: LAB | Age: 55
End: 2025-01-07
Payer: COMMERCIAL

## 2025-01-07 DIAGNOSIS — Z00.00 ANNUAL PHYSICAL EXAM: ICD-10-CM

## 2025-01-07 LAB
ALBUMIN SERPL BCP-MCNC: 4.5 G/DL (ref 3.4–5)
ALP SERPL-CCNC: 77 U/L (ref 33–110)
ALT SERPL W P-5'-P-CCNC: 18 U/L (ref 7–45)
ANION GAP SERPL CALC-SCNC: 12 MMOL/L (ref 10–20)
AST SERPL W P-5'-P-CCNC: 22 U/L (ref 9–39)
BASOPHILS # BLD AUTO: 0.02 X10*3/UL (ref 0–0.1)
BASOPHILS NFR BLD AUTO: 0.5 %
BILIRUB SERPL-MCNC: 0.6 MG/DL (ref 0–1.2)
BUN SERPL-MCNC: 14 MG/DL (ref 6–23)
CALCIUM SERPL-MCNC: 9.5 MG/DL (ref 8.6–10.6)
CHLORIDE SERPL-SCNC: 105 MMOL/L (ref 98–107)
CHOLEST SERPL-MCNC: 241 MG/DL (ref 0–199)
CHOLESTEROL/HDL RATIO: 2.9
CO2 SERPL-SCNC: 28 MMOL/L (ref 21–32)
CREAT SERPL-MCNC: 0.78 MG/DL (ref 0.5–1.05)
EGFRCR SERPLBLD CKD-EPI 2021: 89 ML/MIN/1.73M*2
EOSINOPHIL # BLD AUTO: 0.17 X10*3/UL (ref 0–0.7)
EOSINOPHIL NFR BLD AUTO: 4.4 %
ERYTHROCYTE [DISTWIDTH] IN BLOOD BY AUTOMATED COUNT: 13.2 % (ref 11.5–14.5)
GLUCOSE SERPL-MCNC: 84 MG/DL (ref 74–99)
HCT VFR BLD AUTO: 40.8 % (ref 36–46)
HDLC SERPL-MCNC: 84.4 MG/DL
HGB BLD-MCNC: 13.1 G/DL (ref 12–16)
IMM GRANULOCYTES # BLD AUTO: 0.02 X10*3/UL (ref 0–0.7)
IMM GRANULOCYTES NFR BLD AUTO: 0.5 % (ref 0–0.9)
LDLC SERPL CALC-MCNC: 130 MG/DL
LYMPHOCYTES # BLD AUTO: 1.29 X10*3/UL (ref 1.2–4.8)
LYMPHOCYTES NFR BLD AUTO: 33.5 %
MCH RBC QN AUTO: 29.3 PG (ref 26–34)
MCHC RBC AUTO-ENTMCNC: 32.1 G/DL (ref 32–36)
MCV RBC AUTO: 91 FL (ref 80–100)
MONOCYTES # BLD AUTO: 0.37 X10*3/UL (ref 0.1–1)
MONOCYTES NFR BLD AUTO: 9.6 %
NEUTROPHILS # BLD AUTO: 1.98 X10*3/UL (ref 1.2–7.7)
NEUTROPHILS NFR BLD AUTO: 51.5 %
NON HDL CHOLESTEROL: 157 MG/DL (ref 0–149)
NRBC BLD-RTO: 0 /100 WBCS (ref 0–0)
PLATELET # BLD AUTO: 245 X10*3/UL (ref 150–450)
POTASSIUM SERPL-SCNC: 4.4 MMOL/L (ref 3.5–5.3)
PROT SERPL-MCNC: 6.7 G/DL (ref 6.4–8.2)
RBC # BLD AUTO: 4.47 X10*6/UL (ref 4–5.2)
SODIUM SERPL-SCNC: 141 MMOL/L (ref 136–145)
TRIGL SERPL-MCNC: 134 MG/DL (ref 0–149)
TSH SERPL-ACNC: 3.6 MIU/L (ref 0.44–3.98)
VLDL: 27 MG/DL (ref 0–40)
WBC # BLD AUTO: 3.9 X10*3/UL (ref 4.4–11.3)

## 2025-01-07 PROCEDURE — 80053 COMPREHEN METABOLIC PANEL: CPT

## 2025-01-07 PROCEDURE — 84443 ASSAY THYROID STIM HORMONE: CPT

## 2025-01-07 PROCEDURE — 80061 LIPID PANEL: CPT

## 2025-01-07 PROCEDURE — 85025 COMPLETE CBC W/AUTO DIFF WBC: CPT

## 2025-01-09 NOTE — RESULT ENCOUNTER NOTE
Cholesterol 241, 84, 130, 134 previous 251, 97, 129, 123.  Likely secondary to using the cholesterol medication sparingly before the blood draw  WBC 3.9 previous 3.4, 3.9 so there is stability with normal differential  Thyroid with a TSH of 3.60, which is normal  Sugar, kidney, liver, electrolytes normal

## 2025-01-11 DIAGNOSIS — M25.512 CHRONIC PAIN OF BOTH SHOULDERS: ICD-10-CM

## 2025-01-11 DIAGNOSIS — M25.511 CHRONIC PAIN OF BOTH SHOULDERS: ICD-10-CM

## 2025-01-11 DIAGNOSIS — G89.29 CHRONIC PAIN OF BOTH SHOULDERS: ICD-10-CM

## 2025-01-13 RX ORDER — DICLOFENAC SODIUM 10 MG/G
GEL TOPICAL
Qty: 100 G | Refills: 0 | Status: SHIPPED | OUTPATIENT
Start: 2025-01-13

## 2025-01-20 DIAGNOSIS — G89.18 ACUTE POST-OPERATIVE PAIN: Primary | ICD-10-CM

## 2025-01-20 RX ORDER — OXYCODONE AND ACETAMINOPHEN 5; 325 MG/1; MG/1
1 TABLET ORAL EVERY 6 HOURS PRN
Qty: 24 TABLET | Refills: 0 | Status: SHIPPED | OUTPATIENT
Start: 2025-01-20 | End: 2025-01-26

## 2025-01-20 NOTE — PROGRESS NOTES
Patient is status post orthopaedic shoulder surgery. They called the office today with continued surgical pain. They are having 7 out of 10 pain and are still in the postoperative period. We prescribed Percocet for their pain. They are to use it sparingly, alternating with extra strength Tylenol. I have personally obtained and reviewed the [facility] report on this patient. This report is part of the electronic medical record which will be scanned to the chart. I have considered the risks of abuse, dependence, addiction and diversion. I believe that it is clinically appropriate for this patient to be prescribed this medication at this time due to current post operative symptoms and documented diagnosis.

## 2025-02-04 ENCOUNTER — OFFICE VISIT (OUTPATIENT)
Dept: ORTHOPEDIC SURGERY | Facility: HOSPITAL | Age: 55
End: 2025-02-04
Payer: COMMERCIAL

## 2025-02-04 DIAGNOSIS — Z98.890 STATUS POST ARTHROSCOPY OF LEFT SHOULDER: Primary | ICD-10-CM

## 2025-02-04 PROCEDURE — 99211 OFF/OP EST MAY X REQ PHY/QHP: CPT | Performed by: ORTHOPAEDIC SURGERY

## 2025-02-07 ENCOUNTER — OFFICE VISIT (OUTPATIENT)
Dept: ORTHOPEDIC SURGERY | Facility: CLINIC | Age: 55
End: 2025-02-07
Payer: COMMERCIAL

## 2025-02-07 DIAGNOSIS — M25.561 CHRONIC PAIN OF BOTH KNEES: Primary | ICD-10-CM

## 2025-02-07 DIAGNOSIS — G89.29 CHRONIC PAIN OF BOTH KNEES: Primary | ICD-10-CM

## 2025-02-07 DIAGNOSIS — M25.562 CHRONIC PAIN OF BOTH KNEES: Primary | ICD-10-CM

## 2025-02-07 PROCEDURE — 99213 OFFICE O/P EST LOW 20 MIN: CPT | Performed by: STUDENT IN AN ORGANIZED HEALTH CARE EDUCATION/TRAINING PROGRAM

## 2025-02-13 DIAGNOSIS — M25.511 CHRONIC PAIN OF BOTH SHOULDERS: ICD-10-CM

## 2025-02-13 DIAGNOSIS — G89.29 CHRONIC PAIN OF BOTH SHOULDERS: ICD-10-CM

## 2025-02-13 DIAGNOSIS — M25.512 CHRONIC PAIN OF BOTH SHOULDERS: ICD-10-CM

## 2025-02-13 RX ORDER — DICLOFENAC SODIUM 10 MG/G
GEL TOPICAL
Qty: 100 G | Refills: 0 | Status: SHIPPED | OUTPATIENT
Start: 2025-02-13

## 2025-02-20 ENCOUNTER — TELEPHONE (OUTPATIENT)
Dept: ORTHOPEDIC SURGERY | Facility: HOSPITAL | Age: 55
End: 2025-02-20
Payer: COMMERCIAL

## 2025-02-20 DIAGNOSIS — L81.0 POST-INFLAMMATORY HYPERPIGMENTATION: Primary | ICD-10-CM

## 2025-02-20 RX ORDER — TRETINOIN 0.25 MG/G
CREAM TOPICAL
Qty: 60 G | Refills: 3 | Status: SHIPPED | OUTPATIENT
Start: 2025-02-20

## 2025-02-20 NOTE — TELEPHONE ENCOUNTER
Hi, Ruba came in this morning stating that her PT provider would like to extend her PT sessions. They told her they have been trying to get a hold of you a few times but she was in the area so she just dropped by. She would like a call back to discuss.

## 2025-02-26 ENCOUNTER — HOSPITAL ENCOUNTER (OUTPATIENT)
Dept: RADIOLOGY | Facility: CLINIC | Age: 55
Discharge: HOME | End: 2025-02-26
Payer: COMMERCIAL

## 2025-02-26 ENCOUNTER — OFFICE VISIT (OUTPATIENT)
Dept: URGENT CARE | Age: 55
End: 2025-02-26
Payer: COMMERCIAL

## 2025-02-26 VITALS
SYSTOLIC BLOOD PRESSURE: 124 MMHG | TEMPERATURE: 98.2 F | HEART RATE: 78 BPM | RESPIRATION RATE: 14 BRPM | OXYGEN SATURATION: 98 % | DIASTOLIC BLOOD PRESSURE: 81 MMHG

## 2025-02-26 DIAGNOSIS — S93.401A SPRAIN OF RIGHT ANKLE, UNSPECIFIED LIGAMENT, INITIAL ENCOUNTER: ICD-10-CM

## 2025-02-26 DIAGNOSIS — S93.401A SPRAIN OF RIGHT ANKLE, UNSPECIFIED LIGAMENT, INITIAL ENCOUNTER: Primary | ICD-10-CM

## 2025-02-26 PROCEDURE — 73630 X-RAY EXAM OF FOOT: CPT | Mod: RT

## 2025-02-26 PROCEDURE — 73610 X-RAY EXAM OF ANKLE: CPT | Mod: RT

## 2025-02-26 RX ORDER — NAPROXEN 500 MG/1
500 TABLET ORAL 2 TIMES DAILY PRN
Qty: 20 TABLET | Refills: 0 | Status: SHIPPED | OUTPATIENT
Start: 2025-02-26 | End: 2025-03-08

## 2025-02-26 ASSESSMENT — ENCOUNTER SYMPTOMS: JOINT SWELLING: 1

## 2025-02-26 NOTE — PROGRESS NOTES
Subjective   Patient ID: Ruba Woodard is a 54 y.o. female. They present today with a chief complaint of Injury (Rolled right ankle X 1.5 weeks ago. SURI).    History of Present Illness  Patient presents with concern for right ankle injury from approx 10 days ago. States she rolled the ankle, has pain in lateral and medial ankle, with pain going up the back of the leg with weight bearing. Endorses OTC meds, bracing, ice, and elevation.      Injury      Past Medical History  Allergies as of 02/26/2025 - Reviewed 02/26/2025   Allergen Reaction Noted    Hydrocodone Unknown 09/03/2023    Scopolamine Other 02/11/2015    Morphine Other 10/20/2023       (Not in a hospital admission)       Past Medical History:   Diagnosis Date    Contact with and (suspected) exposure to potentially hazardous body fluids 12/19/2019    Accidental hypodermic needlestick injury with exposure to body fluid    Hyperlipidemia     Parageusia 07/16/2020    Loss of taste    Personal history of malignant neoplasm of breast 12/16/2022    History of malignant neoplasm of left breast    Personal history of other endocrine, nutritional and metabolic disease 10/07/2020    History of thyrotoxicosis       Past Surgical History:   Procedure Laterality Date    ADENOIDECTOMY  11/12/2013    Adenoidectomy    ANKLE ARTHROSCOPY W/ OPEN REPAIR  11/12/2013    Ankle Repair    BI MAMMO GUIDED BREAST RIGHT LOCALIZATION Right 02/20/2020    BI MAMMO GUIDED LOCALIZATION BREAST RIGHT 2/20/2020 [facility]    BREAST BIOPSY Right 11/21/2022    Benign right core biopsy    BREAST LUMPECTOMY Left 12/14/2022    left Lumpectomy with Radiation 12/14/22    OTHER SURGICAL HISTORY  04/21/2021    Tonsillectomy    OTHER SURGICAL HISTORY  04/21/2021    Knee surgery    OTHER SURGICAL HISTORY  04/21/2021    Bunionectomy    OTHER SURGICAL HISTORY  04/21/2021    Breast surgery    OTHER SURGICAL HISTORY  04/21/2021    Endometrial ablation    TONSILLECTOMY  11/12/2013     Tonsillectomy        reports that she has never smoked. She has never been exposed to tobacco smoke. She has never used smokeless tobacco. She reports current alcohol use. She reports that she does not use drugs.    Review of Systems  Review of Systems   Musculoskeletal:  Positive for gait problem and joint swelling.                                  Objective    Vitals:    02/26/25 0833   BP: 124/81   Pulse: 78   Resp: 14   Temp: 36.8 °C (98.2 °F)   SpO2: 98%     No LMP recorded. Patient has had an ablation.    Physical Exam  Vitals reviewed.   Constitutional:       Appearance: Normal appearance.   Cardiovascular:      Rate and Rhythm: Normal rate.   Pulmonary:      Effort: Pulmonary effort is normal.   Musculoskeletal:         General: Swelling, tenderness and signs of injury present. Normal range of motion.      Comments: Right ankle and foot with significant bruising, slight generalized edema, with increased edema at the lateral malleolus, TTP at proximal 5th metatarsal and medial and lateral malleolus.      Skin:     Findings: Bruising present.   Neurological:      Mental Status: She is alert.         Procedures    Point of Care Test & Imaging Results from this visit  No results found for this visit on 02/26/25.   XR ankle right 3+ views    Result Date: 2/26/2025  Interpreted By:  Jovanni Teague, STUDY: XR ANKLE RIGHT 3+ VIEWS; XR FOOT RIGHT 3+ VIEWS;  2/26/2025 9:00 am; 2/26/2025 9:01 am   INDICATION: Signs/Symptoms:rolled ankle, medial and lateral pain, TTP at proximal 5th metatarsal; Signs/Symptoms:rolled ankle, 5 metatarsal TTP. ,S93.401A Sprain of unspecified ligament of right ankle, initial encounter   COMPARISON: None.   ACCESSION NUMBER(S): [id number]; [id number]   ORDERING CLINICIAN: DOUG GONZALEZ   TECHNIQUE: Three views each of the right foot and right ankle were obtained.   FINDINGS: There appears to have been previous fusion of the 1st tarsometatarsal joint. There is some bony sclerosis between the  proximal ends of the 1st and 2nd metatarsals and there are some subchondral cystic changes at the proximal ends of the 1st through 4th metatarsals. No lytic or blastic destructive bone lesion. Moderate lateral ankle soft tissue swelling. There is a tiny minimally displaced avulsion fracture fragment off of the distal tip of the fibula. No other fracture. Ankle mortise and talar dome are intact. No opaque soft tissue foreign body. No periosteal reaction or erosion.       Moderate lateral ankle soft tissue swelling with small slightly displaced avulsion fracture fragment off of the tip of the lateral malleolus. No other acute fracture or dislocation in this exam.   Apparent fusion of the 1st tarsometatarsal joint which could be from previous trauma or surgery. Clinical correlation needed. Mild midfoot arthritic changes as described.   MACRO: None   Signed by: Jovanni Teague 2/26/2025 9:37 AM Dictation workstation:   [id number]    XR foot right 3+ views    Result Date: 2/26/2025  Interpreted By:  Jovanni Teague, STUDY: XR ANKLE RIGHT 3+ VIEWS; XR FOOT RIGHT 3+ VIEWS;  2/26/2025 9:00 am; 2/26/2025 9:01 am   INDICATION: Signs/Symptoms:rolled ankle, medial and lateral pain, TTP at proximal 5th metatarsal; Signs/Symptoms:rolled ankle, 5 metatarsal TTP. ,S93.401A Sprain of unspecified ligament of right ankle, initial encounter   COMPARISON: None.   ACCESSION NUMBER(S): [id number]; [id number]   ORDERING CLINICIAN: DOUG GONZALEZ   TECHNIQUE: Three views each of the right foot and right ankle were obtained.   FINDINGS: There appears to have been previous fusion of the 1st tarsometatarsal joint. There is some bony sclerosis between the proximal ends of the 1st and 2nd metatarsals and there are some subchondral cystic changes at the proximal ends of the 1st through 4th metatarsals. No lytic or blastic destructive bone lesion. Moderate lateral ankle soft tissue swelling. There is a tiny minimally displaced avulsion fracture  fragment off of the distal tip of the fibula. No other fracture. Ankle mortise and talar dome are intact. No opaque soft tissue foreign body. No periosteal reaction or erosion.       Moderate lateral ankle soft tissue swelling with small slightly displaced avulsion fracture fragment off of the tip of the lateral malleolus. No other acute fracture or dislocation in this exam.   Apparent fusion of the 1st tarsometatarsal joint which could be from previous trauma or surgery. Clinical correlation needed. Mild midfoot arthritic changes as described.   MACRO: None   Signed by: Jovanni eTague 2/26/2025 9:37 AM Dictation workstation:   [id number]     Diagnostic study results (if any) were reviewed by MANUEL Haji.    Assessment/Plan   Allergies, medications, history, and pertinent labs/EKGs/Imaging reviewed by MANUEL Haji. Advised preliminary impression is negative for fracture, possible avulsion fracture at lateral malleolus, recommend stirrup brace in conjunction with continued supportive therapy. Provided rx for naproxen. Advise f/up with ortho if symptoms fail to improve with treatement plan.     Final impression shows possible avulsion fracture, advised pt, and provided ortho referral.    Medical Decision Making  At time of discharge patient was clinically well-appearing and HDS for outpatient management. The patient and/or family was educated regarding diagnosis, supportive care, OTC and Rx medications. The patient and/or family was given the opportunity to ask questions prior to discharge.  They verbalized understanding of my discussion of the plans for treatment, expected course, indications to return to  or seek further evaluation in ED, and the need for timely follow up as directed.   They were provided with a work/school excuse if requested.      Orders and Diagnoses  Diagnoses and all orders for this visit:  Sprain of right ankle, unspecified ligament, initial encounter  -     XR ankle  right 3+ views; Future  -     XR foot right 3+ views; Future  -     naproxen (Naprosyn) 500 mg tablet; Take 1 tablet (500 mg) by mouth 2 times a day as needed for mild pain (1 - 3) (pain) for up to 10 days.  -     Aircast Sport Ankle Brace RIGHT  -     Referral to Orthopaedic Surgery; Future      Medical Admin Record      Patient disposition: Home    Electronically signed by MANUEL Haji  11:23 AM

## 2025-02-27 ENCOUNTER — OFFICE VISIT (OUTPATIENT)
Dept: ORTHOPEDIC SURGERY | Facility: CLINIC | Age: 55
End: 2025-02-27
Payer: COMMERCIAL

## 2025-02-27 VITALS — WEIGHT: 136 LBS | BODY MASS INDEX: 21.86 KG/M2 | HEIGHT: 66 IN

## 2025-02-27 DIAGNOSIS — S82.61XA AVULSION FRACTURE OF LATERAL MALLEOLUS OF RIGHT FIBULA, CLOSED, INITIAL ENCOUNTER: Primary | ICD-10-CM

## 2025-02-27 PROCEDURE — 99204 OFFICE O/P NEW MOD 45 MIN: CPT | Performed by: SPECIALIST

## 2025-02-27 PROCEDURE — 3008F BODY MASS INDEX DOCD: CPT | Performed by: SPECIALIST

## 2025-02-28 NOTE — PROGRESS NOTES
Pt is a 54 Y old Female presenting today for a right ankle. Pt rolled her ankle off the side walk while walking on 02/16/2025  X rays done at Urgent care in [facility] yesterday. Currently taking ibuprofen for the pain. Never smoker. No history of DM     Exam: Right ankle with diffuse swelling mild hindfoot ecchymosis.  Dermis intact neurovascular intact negative Homans.  Pain primarily over the lateral malleolus both the tip and anterior talar fib ligament region minimal medial pain normal midfoot pain no proximal leg pain.  Active and passive motion intact of the ankle without gross instability.    Radiographs 3 views right ankle identifies a small avulsion fracture at the tip of the lateral malleolus.  Ankle mortise appears stable.    Assessment plan: Inversion sprain right ankle with right lateral malleolus avulsion fracture.  She has a fracture boot at home and I recommend using it for 2 to 4 weeks till the pain and swelling are under control.  This appears to be a stable injury and will do a simple repeat exam in a month.  In the meantime she is given a home range of motion strengthening program.  Follow-up as above, no x-rays if doing well

## 2025-03-03 ENCOUNTER — APPOINTMENT (OUTPATIENT)
Dept: ORTHOPEDIC SURGERY | Facility: CLINIC | Age: 55
End: 2025-03-03
Payer: COMMERCIAL

## 2025-03-03 DIAGNOSIS — M77.8 RIGHT SHOULDER TENDONITIS: Primary | ICD-10-CM

## 2025-03-03 DIAGNOSIS — M75.122 COMPLETE TEAR OF LEFT ROTATOR CUFF, UNSPECIFIED WHETHER TRAUMATIC: ICD-10-CM

## 2025-03-03 RX ORDER — TRIAMCINOLONE ACETONIDE 40 MG/ML
40 INJECTION, SUSPENSION INTRA-ARTICULAR; INTRAMUSCULAR
Status: COMPLETED | OUTPATIENT
Start: 2025-03-03 | End: 2025-03-03

## 2025-03-03 RX ORDER — LIDOCAINE HYDROCHLORIDE 10 MG/ML
4 INJECTION, SOLUTION INFILTRATION; PERINEURAL
Status: COMPLETED | OUTPATIENT
Start: 2025-03-03 | End: 2025-03-03

## 2025-03-03 RX ADMIN — TRIAMCINOLONE ACETONIDE 40 MG: 40 INJECTION, SUSPENSION INTRA-ARTICULAR; INTRAMUSCULAR at 11:13

## 2025-03-03 RX ADMIN — LIDOCAINE HYDROCHLORIDE 4 ML: 10 INJECTION, SOLUTION INFILTRATION; PERINEURAL at 11:13
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
## 2025-03-11 ENCOUNTER — APPOINTMENT (OUTPATIENT)
Dept: HEMATOLOGY/ONCOLOGY | Facility: CLINIC | Age: 55
End: 2025-03-11
Payer: COMMERCIAL

## 2025-03-12 ENCOUNTER — TELEMEDICINE (OUTPATIENT)
Dept: HEMATOLOGY/ONCOLOGY | Facility: CLINIC | Age: 55
End: 2025-03-12
Payer: COMMERCIAL

## 2025-03-12 DIAGNOSIS — C50.412 MALIGNANT NEOPLASM OF UPPER-OUTER QUADRANT OF LEFT BREAST IN FEMALE, ESTROGEN RECEPTOR POSITIVE: ICD-10-CM

## 2025-03-12 DIAGNOSIS — F43.10 PTSD (POST-TRAUMATIC STRESS DISORDER): ICD-10-CM

## 2025-03-12 DIAGNOSIS — Z17.0 MALIGNANT NEOPLASM OF UPPER-OUTER QUADRANT OF LEFT BREAST IN FEMALE, ESTROGEN RECEPTOR POSITIVE: ICD-10-CM

## 2025-03-12 DIAGNOSIS — C50.919 MALIGNANT NEOPLASM OF FEMALE BREAST, UNSPECIFIED ESTROGEN RECEPTOR STATUS, UNSPECIFIED LATERALITY, UNSPECIFIED SITE OF BREAST: ICD-10-CM

## 2025-03-12 RX ORDER — LETROZOLE 2.5 MG/1
2.5 TABLET, FILM COATED ORAL DAILY
Qty: 90 TABLET | Refills: 2 | Status: SHIPPED | OUTPATIENT
Start: 2025-03-12 | End: 2026-03-12

## 2025-03-12 RX ORDER — ESCITALOPRAM OXALATE 10 MG/1
10 TABLET ORAL DAILY
Qty: 90 TABLET | Refills: 3 | Status: SHIPPED | OUTPATIENT
Start: 2025-03-12

## 2025-03-12 NOTE — PROGRESS NOTES
Patient ID: Ruba Woodard is a 54 y.o. female.    The patient presents to clinic today for her history of breast cancer.      Diagnostic/Therapeutic History:       Cancer History:          Breast         AJCC Edition: 8th (AJCC), Diagnosis Date: Dec 2022, IA, pT1b pN0 cM0 G1     Treatment Synopsis:    53 year female with newly diagnosed with recently diagnosed left breast invasive carcinoma  IDC grade 2, ER 95%, TN 70% her2 2+ but negative by dual RE left partial mastectomy with  SLNB  (0/2) c/w grade I IDC, with DCIS grade2 , low risk mammaprint low risk luminal A +0.505, stage as [id number]      On 10/13/2022: had a digital mammogram, screening that showed heterogenously dense breast tissue which may obscure small masses, left breat asymetry with right breast calcifications.  BIRADS category 0     On 10/28/2022: she had diagnostic mammogram that showed heterogenously dense breast, previously noted calcifications are grouped round, punctuate and indistinct calcifications in the inferolateral right breast  at anterior depth. within the left breast,  the previously noted asymmetry persists as an irregular focal asymmetry in the superolateral aspect of the posterior depth . On ultrasound at 1o'clock, 8 CFN  0.7x0.8x0.5cm mass irregular , left axilla US showed 3 unremarkable LN      On 11/08/2022: right breast calcification core needle biopsy  showed columnar cell change and hyperplasia with associated focal microcalcifications, left breast core needle biopsy at 1oclock, 10 CFN showed IDC grade 2, ER 95%, TN 70% her2 2+ but negative  by dual RE .with associated microcalcifcation with DICS, low nuclear grade, cribriform pattern with necrosis  and calcifications. biopsy again of her right breast calcifications that was negative     On 12/14/2022 underwent left partial mastectomy with SLNB  (0/2) c/w grade I IDC, with DCIS grade2 , low risk mammaprint low risk luminal A +0.505, stage as [id number]     S/P Radiation:       Location : Left breast.  Dates : 2023 through 2023.  Number of Treatments : 15  Dose : 40.05 Gray.  Modality : 6 and 10 MV photons.    History of Present Illness (HPI)/Interval History:    Doing okay  No fever, no chills, no CP, no SOB  Intermittent fatigue. No GI side effects    PMH: Hyperlipidemia, arthritis.      PSH: tonsilectomy  ankle surgery, knee surgery and an endometrial ablation.     Allergies: reviewed      SH:  Pharmacisit. accompanied by daughter      Reproductive hx:  Menarche age 15.  .  OCP  for 10 years.  She denies any history of HRT     Review of Systems:  14-point ROS otherwise negative, as per HPI.    Past Medical History:   Diagnosis Date    Contact with and (suspected) exposure to potentially hazardous body fluids 2019    Accidental hypodermic needlestick injury with exposure to body fluid    Hyperlipidemia     Parageusia 2020    Loss of taste    Personal history of malignant neoplasm of breast 2022    History of malignant neoplasm of left breast    Personal history of other endocrine, nutritional and metabolic disease 10/07/2020    History of thyrotoxicosis       Past Surgical History:   Procedure Laterality Date    ADENOIDECTOMY  2013    Adenoidectomy    ANKLE ARTHROSCOPY W/ OPEN REPAIR  2013    Ankle Repair    BI MAMMO GUIDED BREAST RIGHT LOCALIZATION Right 2020    BI MAMMO GUIDED LOCALIZATION BREAST RIGHT 2020 [facility]    BREAST BIOPSY Right 2022    Benign right core biopsy    BREAST LUMPECTOMY Left 2022    left Lumpectomy with Radiation 22    OTHER SURGICAL HISTORY  2021    Tonsillectomy    OTHER SURGICAL HISTORY  2021    Knee surgery    OTHER SURGICAL HISTORY  2021    Bunionectomy    OTHER SURGICAL HISTORY  2021    Breast surgery    OTHER SURGICAL HISTORY  2021    Endometrial ablation    TONSILLECTOMY  2013    Tonsillectomy       Social History     Socioeconomic  History    Marital status:    Tobacco Use    Smoking status: Never     Passive exposure: Never    Smokeless tobacco: Never   Vaping Use    Vaping status: Never Used   Substance and Sexual Activity    Alcohol use: Yes    Drug use: Never    Sexual activity: Yes     Partners: Male     Birth control/protection: Post-menopausal       Allergies   Allergen Reactions    Hydrocodone Unknown    Scopolamine Other     Pain  stiffness    Morphine Other         Current Outpatient Medications:     acetaminophen (Tylenol) 500 mg tablet, Take 2 tablets (1,000 mg) by mouth every 6 hours if needed., Disp: , Rfl:     ascorbic acid, vitamin C, 500 mg capsule, Take 500 mg by mouth once daily., Disp: , Rfl:     atorvastatin (Lipitor) 40 mg tablet, Take 1 tablet (40 mg) by mouth once daily., Disp: 90 tablet, Rfl: 1    calcium carbonate-vitamin D3 500 mg-5 mcg (200 unit) tablet, Take 1 tablet by mouth once daily., Disp: , Rfl:     diclofenac sodium (Voltaren) 1 % gel, APPLY   TOPICALLY TO AFFECTED AREA ONCE DAILY, Disp: 100 g, Rfl: 0    escitalopram (Lexapro) 10 mg tablet, Take 1 tablet (10 mg) by mouth once daily., Disp: 90 tablet, Rfl: 1    letrozole (Femara) 2.5 mg tablet, Take 1 tablet (2.5 mg total) by mouth once daily.  Take with or without food., Disp: 90 tablet, Rfl: 2    lidocaine (Lidoderm) 5 % patch, PLACE 1 PATCH OVER 12 HOURS ON THE SKIN ONCE DAILY. REMOVE AND DISCARD PATCH WITHIN 12 HOURS OR AS DIRECTED BY MD, Disp: 90 patch, Rfl: 0    multivitamin (Multiple Vitamins) tablet, Take by mouth once daily., Disp: , Rfl:     ondansetron ODT (Zofran-ODT) 4 mg disintegrating tablet, Dissolve 1 tablet (4 mg) in the mouth every 6 hours if needed for nausea or vomiting., Disp: , Rfl:     tretinoin (Retin-A) 0.025 % cream, Apply nightly to bilateral shins, Disp: 60 g, Rfl: 3     Objective    BSA: There is no height or weight on file to calculate BSA.  There were no vitals taken for this visit.    Physical Exam    Virtual visit so  no PE was done    Laboratory Data:  Lab Results   Component Value Date    WBC 3.9 (L) 01/07/2025    HGB 13.1 01/07/2025    HCT 40.8 01/07/2025    MCV 91 01/07/2025     01/07/2025       Chemistry    Lab Results   Component Value Date/Time     01/07/2025 1128    K 4.4 01/07/2025 1128     01/07/2025 1128    CO2 28 01/07/2025 1128    BUN 14 01/07/2025 1128    CREATININE 0.78 01/07/2025 1128    Lab Results   Component Value Date/Time    CALCIUM 9.5 01/07/2025 1128    ALKPHOS 77 01/07/2025 1128    AST 22 01/07/2025 1128    ALT 18 01/07/2025 1128    BILITOT 0.6 01/07/2025 1128        Assessment/Plan:    54 year female with  left breast invasive carcinoma  IDC grade 2, ER 95%, [place] 70% her2 2+ but negative by dual RE s/p  left partial mastectomy  with SLNB  (0/2) c/w grade I IDC, with DCIS grade2 , low risk mammaprint low risk luminal A +0.505, stage as [id number] presenting to discuss adjuvant treatment options.      I reviewed with her the events that led to her diagnosis of breast cancer. We reviewed all the procedures and diagnostic imaging she underwent thus far. I discussed the features of her breast cancer that include the size, grade, lymph node status and  hormone receptor/ her2-ashley status.     [place]  was being considered for ANNELIESE trial NRG-. This study randomizes between radiation and physician chosen endocrine therapy or physician chosen endocrine therapy alone, she was eligible as oncotype was less 18 (15) . Ruba decided to proceed with radiation so she wont be enrolling in the ANNELIESE trial NRG-. Completed radiation. Started on adjuvant hormonal therapy. started Aromatase inhibitor with Ca/vit D. Does have elevated cholesterol, has follow up with her cardiologist.      Endmetrial ablation with LMP > 1 year ago  FSH 52, estradiol <19    - continue letrozole, Ca/vit D; refill sent  - lexapro refilled for hot flushes  -11/1/2024: Diagnostic mammogram negative; fup in nov 2025    RTC in   6M    At least 35 minutes of direct consultation was spent reviewing the patient's chart as well as discussing and  reviewing the  cancer care plan including educating and answering  questions and concerns, greater than 50 percent spent in counseling and coordination  of care.       No orders of the defined types were placed in this encounter.            Gomez Olivera MD  Hematology and Medical Oncology  [facility]

## 2025-03-18 ENCOUNTER — APPOINTMENT (OUTPATIENT)
Dept: ORTHOPEDIC SURGERY | Facility: HOSPITAL | Age: 55
End: 2025-03-18
Payer: COMMERCIAL

## 2025-03-18 DIAGNOSIS — G89.29 CHRONIC PAIN OF BOTH SHOULDERS: ICD-10-CM

## 2025-03-18 DIAGNOSIS — M25.511 CHRONIC PAIN OF BOTH SHOULDERS: ICD-10-CM

## 2025-03-18 DIAGNOSIS — M25.512 CHRONIC PAIN OF BOTH SHOULDERS: ICD-10-CM

## 2025-03-18 RX ORDER — DICLOFENAC SODIUM 10 MG/G
GEL TOPICAL
Qty: 100 G | Refills: 0 | Status: SHIPPED | OUTPATIENT
Start: 2025-03-18

## 2025-03-21 ENCOUNTER — APPOINTMENT (OUTPATIENT)
Dept: ORTHOPEDIC SURGERY | Age: 55
End: 2025-03-21
Payer: COMMERCIAL

## 2025-03-21 VITALS — BODY MASS INDEX: 22.02 KG/M2 | WEIGHT: 137 LBS | HEIGHT: 66 IN

## 2025-03-21 DIAGNOSIS — S82.61XA AVULSION FRACTURE OF LATERAL MALLEOLUS OF RIGHT FIBULA, CLOSED, INITIAL ENCOUNTER: Primary | ICD-10-CM

## 2025-03-21 PROCEDURE — 3008F BODY MASS INDEX DOCD: CPT | Performed by: SPECIALIST

## 2025-03-21 PROCEDURE — 99213 OFFICE O/P EST LOW 20 MIN: CPT | Performed by: SPECIALIST

## 2025-03-22 NOTE — PROGRESS NOTES
Patient is seen in follow-up of her lateral malleolus avulsion fracture.  She has been in a fracture boot and is improving.  Here for reassessment no x-rays.    Exam: She comes in weightbearing in her boot.  Her right ankle has decreased swelling resolving ecchymosis no erythema dermis intact.  She has pain over the anterior talar fib calcaneal fib ligament regions.  Intact peroneal tendons, full strength no subluxation.  1+ anterior drawer negative inversion or external rotation instability.  Mild pain over the anteromedial joint line regions of the ankle.  The rest of the exam is unremarkable.    Assessment plan: Healing avulsion fracture lateral malleolus right ankle.  She will participate in physical therapy, weaning from boot as tolerated.  Will see her back in a month no x-rays if doing well.

## 2025-04-07 ENCOUNTER — APPOINTMENT (OUTPATIENT)
Dept: ORTHOPEDIC SURGERY | Facility: CLINIC | Age: 55
End: 2025-04-07
Payer: COMMERCIAL

## 2025-04-10 ENCOUNTER — APPOINTMENT (OUTPATIENT)
Dept: OBSTETRICS AND GYNECOLOGY | Facility: CLINIC | Age: 55
End: 2025-04-10
Payer: COMMERCIAL

## 2025-04-17 DIAGNOSIS — M25.511 CHRONIC PAIN OF BOTH SHOULDERS: ICD-10-CM

## 2025-04-17 DIAGNOSIS — G89.29 CHRONIC PAIN OF BOTH SHOULDERS: ICD-10-CM

## 2025-04-17 DIAGNOSIS — M25.512 CHRONIC PAIN OF BOTH SHOULDERS: ICD-10-CM

## 2025-04-17 RX ORDER — DICLOFENAC SODIUM 10 MG/G
GEL TOPICAL
Qty: 100 G | Refills: 0 | Status: SHIPPED | OUTPATIENT
Start: 2025-04-17

## 2025-04-23 ENCOUNTER — APPOINTMENT (OUTPATIENT)
Dept: DERMATOLOGY | Facility: CLINIC | Age: 55
End: 2025-04-23
Payer: COMMERCIAL

## 2025-04-23 DIAGNOSIS — D22.5 MELANOCYTIC NEVUS OF TRUNK: ICD-10-CM

## 2025-04-23 DIAGNOSIS — L82.1 SEBORRHEIC KERATOSIS: ICD-10-CM

## 2025-04-23 DIAGNOSIS — D48.5 NEOPLASM OF UNCERTAIN BEHAVIOR OF SKIN: Primary | ICD-10-CM

## 2025-04-23 DIAGNOSIS — L57.8 DIFFUSE PHOTODAMAGE OF SKIN: ICD-10-CM

## 2025-04-23 DIAGNOSIS — I87.2 VENOUS STASIS DERMATITIS OF LEFT LOWER EXTREMITY: ICD-10-CM

## 2025-04-23 DIAGNOSIS — Z85.828 HISTORY OF NONMELANOMA SKIN CANCER: ICD-10-CM

## 2025-04-23 DIAGNOSIS — D18.01 HEMANGIOMA OF SKIN: ICD-10-CM

## 2025-04-23 DIAGNOSIS — L82.0 INFLAMED SEBORRHEIC KERATOSIS: ICD-10-CM

## 2025-04-23 DIAGNOSIS — L57.0 ACTINIC KERATOSIS: ICD-10-CM

## 2025-04-23 PROCEDURE — 17004 DESTROY PREMAL LESIONS 15/>: CPT | Performed by: DERMATOLOGY

## 2025-04-23 PROCEDURE — 1036F TOBACCO NON-USER: CPT | Performed by: DERMATOLOGY

## 2025-04-23 PROCEDURE — 11301 SHAVE SKIN LESION 0.6-1.0 CM: CPT | Performed by: DERMATOLOGY

## 2025-04-23 PROCEDURE — 99214 OFFICE O/P EST MOD 30 MIN: CPT | Performed by: DERMATOLOGY

## 2025-04-23 PROCEDURE — 17110 DESTRUCTION B9 LES UP TO 14: CPT | Performed by: DERMATOLOGY

## 2025-04-23 RX ORDER — TRIAMCINOLONE ACETONIDE 1 MG/G
CREAM TOPICAL
Qty: 80 G | Refills: 1 | Status: SHIPPED | OUTPATIENT
Start: 2025-04-23

## 2025-04-23 NOTE — Clinical Note
Modi Angiomas - the benign nature of these vascular lesions was discussed with the patient today and reassurance provided.  No treatment is medically indicated for these lesions at this time.

## 2025-04-23 NOTE — Clinical Note
Inflamed Seborrheic Keratoses -left posterior flank.  The benign nature of these lesions was discussed with the patient today and reassurance provided.  Given the history the patient provides of frequent irritation and associated symptoms as well as their inflamed appearance on exam today, I offered to treat these 2 lesions with liquid nitrogen cryotherapy.  The patient expressed understanding, is in agreement with this plan, and wishes to proceed with cryotherapy today.

## 2025-04-23 NOTE — Clinical Note
Actinic Keratoses -scattered on face and bilateral dorsal hands.  The pre-cancerous nature of these lesions and treatment options were discussed with the patient today.  At this time, I recommend treatment with liquid nitrogen cryotherapy.  The patient expressed understanding, is in agreement with this plan, and wishes to proceed with cryotherapy today.

## 2025-04-23 NOTE — Clinical Note
On the patient's mid back and left distal dorsal forearm, there are well-healed scars with no evidence of recurrent growth on exam today.

## 2025-04-23 NOTE — Clinical Note
Venous Stasis Dermatitis - bilateral legs.  The potentially chronic and intermittently flaring nature of this condition secondary to chronic lower extremity edema or venous stasis and treatment options were discussed extensively with the patient today.  At this time, most importantly, I recommend the patient begin wearing compression stockings on a daily basis, which I recommend the patient place on the legs first thing every morning after sleeping with legs elevated overnight and as frequently throughout the day as possible in order to minimize lower extremity edema.  In addition, for the stasis dermatitis, I recommend topical steroid therapy with Triamcinolone 0.1% cream, which the patient was instructed to apply twice daily to the affected areas of the legs (avoid face, groin, body folds) for the next 2 weeks, followed by taper to twice daily on weekends only for persistent areas; the patient may repeat treatment in a 2-week burst-and-taper fashion every 6-8 weeks as needed for future flares.  I also emphasized the importance of dry, sensitive skin care, including the use of a mild soap, such as Dove, and frequent and aggressive moisturization, at least twice daily and immediately following showers or baths, with recommended over-the-counter moisturizing creams, such as Eucerin, Cetaphil, Cerave, or Aveeno, or Vaseline or Aquaphor ointments.  The risks, benefits, and side effects of this medication, including possible skin atrophy with overuse of topical steroids, were discussed.  The patient expressed understanding and is in agreement with this plan.

## 2025-04-23 NOTE — Clinical Note
On the patient's bilateral legs, there are similar-appearing and symmetric erythematous, slightly scaly, thin plaques with underlying 1-2+ bilateral pitting edema and surrounding hyperpigmentation and dyspigmentation consistent with chronic venous stasis changes

## 2025-04-23 NOTE — Clinical Note
On the patient's left posterior flank, there are 2 similar-appearing 1 cm erythematous and light brown-colored, hyperkeratotic, stuck-on appearing papules each with a surrounding rim of erythema

## 2025-04-23 NOTE — Clinical Note
History of nonmelanoma skin cancers and actinic keratoses and photodamage.  There is no evidence of recurrence at the sites of the patient's previously treated NMSCs on exam today.  The signs and symptoms of skin cancer were reviewed and the patient was advised to practice sun protection and sun avoidance, use daily sunscreen, and perform regular self skin exams.  I will have the patient return to our office in 4 to 6 months, pending the above biopsy result, for routine follow-up and skin exam, and the patient was instructed to call our office should the patient notice any new, changing, symptomatic, or otherwise concerning skin lesions before then.  The patient expressed understanding and is in agreement with this plan.

## 2025-04-23 NOTE — Clinical Note
Seborrheic Keratoses - the benign nature of these lesions was discussed with the patient today and reassurance provided.  No treatment is medically indicated for the noninflamed SKs at this time.

## 2025-04-23 NOTE — Clinical Note
Scattered on the patient's face and bilateral dorsal hands, there are multiple erythematous, gritty, scaly macules

## 2025-04-24 ENCOUNTER — APPOINTMENT (OUTPATIENT)
Dept: OBSTETRICS AND GYNECOLOGY | Facility: CLINIC | Age: 55
End: 2025-04-24
Payer: COMMERCIAL

## 2025-04-24 VITALS
SYSTOLIC BLOOD PRESSURE: 110 MMHG | WEIGHT: 140 LBS | HEIGHT: 66 IN | BODY MASS INDEX: 22.5 KG/M2 | DIASTOLIC BLOOD PRESSURE: 62 MMHG

## 2025-04-24 DIAGNOSIS — Z01.419 WELL WOMAN EXAM WITH ROUTINE GYNECOLOGICAL EXAM: ICD-10-CM

## 2025-04-24 DIAGNOSIS — Z12.31 BREAST CANCER SCREENING BY MAMMOGRAM: ICD-10-CM

## 2025-04-24 PROCEDURE — 88175 CYTOPATH C/V AUTO FLUID REDO: CPT

## 2025-04-24 PROCEDURE — 99396 PREV VISIT EST AGE 40-64: CPT | Performed by: OBSTETRICS & GYNECOLOGY

## 2025-04-24 PROCEDURE — 1036F TOBACCO NON-USER: CPT | Performed by: OBSTETRICS & GYNECOLOGY

## 2025-04-24 PROCEDURE — 3008F BODY MASS INDEX DOCD: CPT | Performed by: OBSTETRICS & GYNECOLOGY

## 2025-04-24 ASSESSMENT — PAIN SCALES - GENERAL: PAINLEVEL_OUTOF10: 0-NO PAIN

## 2025-04-24 NOTE — PROGRESS NOTES
Subjective   Patient ID: Ruba Woodard is a 55 y.o. female who presents for Well Women Visit (Annual exam with pap smear and mammogram order//No complaints//Chaperone declined/).  HPI  As contained in the chief complaint  Review of Systems  .10 systems have been reviewed and are negative and noncontributory to the patient current ailments.    Objective   Physical Exam  Vital signs reviewed    Constitutional: Well nourished, well developed, looks like stated age.  She is sitting comfortably on the exam table in no apparent distress  ENMT: Mucous membranes moist, no apparent lesions   Skin: Warm and dry, no lesions, no rashes  Eyes:  Normal external exam  Head/Neck: Neck supple, no bruits, thyroid symmetrical ,normal size and normal consistency  Cardiovascular: RRR without murmur, S3 or S4, 2+ equal pulses of the extremities.  Respiratory/Thorax:  breathing comfortably, no dyspnea, good chest expansion   Extremities: No deformities.  Edema, discoloration, or pain in BLE, no joint swelling, normal strength  Neurological:  A/Ox3, conversational   Psychiatric:  Alert, pleasant and cordial, age-appropriate, not anxious, or depressed appearing  Breasts: Normal appearance, no skin changes or nipple discharge.  Palpation of the breast and axillae: No no masses palpable, no organomegaly, and no axillary lymphadenopathy  Gastrointestinal: Soft, non distended, bowel sound normal pitch and intensity,no palpable abnormal masses  Genitourinary:  External genitalia: Normal.                                 - Uterus: AV/AF NSSC, mobile and nontender                                -The adnexal areas are free of tenderness or mass                                -There are no cervical lesions; there is no cervical motion tenderness                                -Vagina without lesions, mucosa pink and well-hydrated, discharge is physiologic.                                   -Inspection of Perianal Area: Normal    Assessment/Plan    Diagnoses and all orders for this visit:  Well woman exam with routine gynecological exam  Breast cancer screening by mammogram           Black Briseno DO 04/24/25 9:04 AM

## 2025-04-24 NOTE — PROGRESS NOTES
Subjective     Ruba Woodard is a 55 y.o. female who presents for the following: Skin Check.  She notes 2 brown, raised, rough bumps on her left side, which have been present for several months and have been itching recently.  They have not changed in any other way, including in size, shape, or color, and they do not hurt or bleed.  She also notes red, scaly, itchy areas on her legs.  She denies any other new, changing, or concerning skin lesions since her last visit; no bleeding, itching, or burning lesions.      Review of Systems:  No other skin or systemic complaints other than what is documented elsewhere in the note.    The following portions of the chart were reviewed this encounter and updated as appropriate:       Skin Cancer History  Biopsy Log Book  No skin cancers from Specimen Tracking.    Additional History      Specialty Problems          Dermatology Problems    Actinic keratosis    Allergic contact dermatitis, unspecified cause    Dermatitis, unspecified    Dyshidrosis (pompholyx)    Hemangioma of skin and subcutaneous tissue    Melanocytic nevi of other parts of face    Melanocytic nevi of scalp and neck    Melanocytic nevi of trunk    Melanocytic nevi of unspecified lower limb, including hip    Melanocytic nevi of unspecified upper limb, including shoulder    Other melanin hyperpigmentation    Scar condition and fibrosis of skin    Squamous cell carcinoma of skin of left upper limb, including shoulder    Hyperpigmentation of skin       Past Dermatologic / Past Relevant Medical History:    - history of BCC on mid-back diagnosed in May 2013 s/p ED&C  - SCC on left distal dorsal forearm diagnosed in January 2022 s/p wide local excision with 5-mm margins by Dr. 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    Allergies:  Hydrocodone, Scopolamine, and Morphine    Current Medications / CAM's:  Current Medications[1]     Objective   Well appearing patient in no apparent distress; mood and affect are within normal limits.    A full examination was performed including scalp, face, eyes, ears, nose, lips, neck, chest, axillae, abdomen, back, bilateral upper extremities, and bilateral lower extremities. All findings within normal limits unless otherwise noted below.    Assessment/Plan   Skin Exam  1. NEOPLASM OF UNCERTAIN BEHAVIOR OF SKIN  Right Posterior Proximal Shoulder  7 mm erythematous, scaly papule     Shave removal    Lesion length (cm):  0.7  Margin per side (cm):  0  Lesion diameter (cm):  0.7  Informed consent: discussed and consent obtained    Timeout: patient name, date of birth, surgical site, and procedure verified    Procedure prep:  Patient was prepped and draped  Anesthesia: the lesion was anesthetized in a standard fashion    Anesthetic:  1% lidocaine w/ epinephrine 1-100,000 local infiltration  Instrument used: flexible razor blade    Hemostasis achieved with: aluminum chloride    Outcome: patient tolerated procedure well    Post-procedure details: sterile dressing applied and wound care instructions given    Dressing type: bandage and petrolatum      Staff Communication: Dermatology Local Anesthesia: 1 % Lidocaine / Epinephrine - Amount:0.5ml  Specimen 1 - Dermatopathology- DERM LAB  Differential Diagnosis: ISK v BCC v SCC  Check Margins Yes/No?:    Comments:    Dermpath Lab: Routine Histopathology (formalin-fixed tissue)  2. HISTORY OF NONMELANOMA SKIN CANCER  Generalized  On the patient's mid back and left distal dorsal forearm, there are well-healed scars with no evidence of  recurrent growth on exam today.  History of nonmelanoma skin cancers and actinic keratoses and photodamage.  There is no evidence of recurrence at the sites of the patient's previously treated NMSCs on exam today.  The signs and symptoms of skin cancer were reviewed and the patient was advised to practice sun protection and sun avoidance, use daily sunscreen, and perform regular self skin exams.  I will have the patient return to our office in 4 to 6 months, pending the above biopsy result, for routine follow-up and skin exam, and the patient was instructed to call our office should the patient notice any new, changing, symptomatic, or otherwise concerning skin lesions before then.  The patient expressed understanding and is in agreement with this plan.  Related Procedures  Follow Up In Dermatology - Established Patient  3. ACTINIC KERATOSIS (16)  Head - Anterior (Face) (16)  Scattered on the patient's face and bilateral dorsal hands, there are multiple erythematous, gritty, scaly macules   Actinic Keratoses -scattered on face and bilateral dorsal hands.  The pre-cancerous nature of these lesions and treatment options were discussed with the patient today.  At this time, I recommend treatment with liquid nitrogen cryotherapy.  The patient expressed understanding, is in agreement with this plan, and wishes to proceed with cryotherapy today.  Destr of lesion - Head - Anterior (Face) (16)  Complexity: simple    Destruction method: cryotherapy    Informed consent: discussed and consent obtained    Lesion destroyed using liquid nitrogen: Yes    Cryotherapy cycles:  1  Outcome: patient tolerated procedure well with no complications    Post-procedure details: wound care instructions given    4. INFLAMED SEBORRHEIC KERATOSIS (2)  Left Flank (2)  On the patient's left posterior flank, there are 2 similar-appearing 1 cm erythematous and light brown-colored, hyperkeratotic, stuck-on appearing papules each with a surrounding  rim of erythema  Inflamed Seborrheic Keratoses -left posterior flank.  The benign nature of these lesions was discussed with the patient today and reassurance provided.  Given the history the patient provides of frequent irritation and associated symptoms as well as their inflamed appearance on exam today, I offered to treat these 2 lesions with liquid nitrogen cryotherapy.  The patient expressed understanding, is in agreement with this plan, and wishes to proceed with cryotherapy today.  Destr of lesion - Left Flank (2)  Complexity: simple    Destruction method: cryotherapy    Informed consent: discussed and consent obtained    Lesion destroyed using liquid nitrogen: Yes    Cryotherapy cycles:  2  Outcome: patient tolerated procedure well with no complications    Post-procedure details: wound care instructions given    5. MELANOCYTIC NEVUS OF TRUNK  Generalized  Scattered on the patient's face, neck, trunk, and extremities, there are several small, round- to oval-shaped, brown-pigmented and pink-colored, symmetric, uniform-appearing macules and dome-shaped papules  Clinically benign- to slightly atypical-appearing nevi - the clinically benign- to slightly atypical-appearing nature of the patient's nevi was discussed with the patient today.  None of the patient's nevi meet threshold for biopsy today.  I emphasized the importance of performing monthly self-skin exams using the ABCDs of monitoring moles, which were reviewed with the patient today and an informational hand-out provided.  I also emphasized the importance of sun avoidance and sun protection with daily sunscreen use.  The patient expressed understanding and is in agreement with this plan.  6. SEBORRHEIC KERATOSIS  Generalized  Scattered on the patient's face, neck, trunk, and extremities, there are multiple tan- to light brown-colored, hyperkeratotic, stuck-on appearing papules of varying size and shape  Seborrheic Keratoses - the benign nature of these  lesions was discussed with the patient today and reassurance provided.  No treatment is medically indicated for the noninflamed SKs at this time.  7. HEMANGIOMA OF SKIN  Generalized  Scattered on the patient's face, neck, trunk, and extremities, there are multiple small, round, cherry red- to purplish-colored, symmetric, uniform, vascular-appearing macules and papules  Cherry Angiomas - the benign nature of these vascular lesions was discussed with the patient today and reassurance provided.  No treatment is medically indicated for these lesions at this time.  8. VENOUS STASIS DERMATITIS OF LEFT LOWER EXTREMITY  Left Lower Leg - Anterior  On the patient's bilateral legs, there are similar-appearing and symmetric erythematous, slightly scaly, thin plaques with underlying 1-2+ bilateral pitting edema and surrounding hyperpigmentation and dyspigmentation consistent with chronic venous stasis changes  Venous Stasis Dermatitis - bilateral legs.  The potentially chronic and intermittently flaring nature of this condition secondary to chronic lower extremity edema or venous stasis and treatment options were discussed extensively with the patient today.  At this time, most importantly, I recommend the patient begin wearing compression stockings on a daily basis, which I recommend the patient place on the legs first thing every morning after sleeping with legs elevated overnight and as frequently throughout the day as possible in order to minimize lower extremity edema.  In addition, for the stasis dermatitis, I recommend topical steroid therapy with Triamcinolone 0.1% cream, which the patient was instructed to apply twice daily to the affected areas of the legs (avoid face, groin, body folds) for the next 2 weeks, followed by taper to twice daily on weekends only for persistent areas; the patient may repeat treatment in a 2-week burst-and-taper fashion every 6-8 weeks as needed for future flares.  I also emphasized the  importance of dry, sensitive skin care, including the use of a mild soap, such as Dove, and frequent and aggressive moisturization, at least twice daily and immediately following showers or baths, with recommended over-the-counter moisturizing creams, such as Eucerin, Cetaphil, Cerave, or Aveeno, or Vaseline or Aquaphor ointments.  The risks, benefits, and side effects of this medication, including possible skin atrophy with overuse of topical steroids, were discussed.  The patient expressed understanding and is in agreement with this plan.  triamcinolone (Kenalog) 0.1 % cream - Left Lower Leg - Anterior  Apply twice daily to affected areas of legs (avoid face, groin, body folds) for 2 weeks, then taper to twice daily on weekends only; repeat every 6-8 weeks as needed for flares  9. DIFFUSE PHOTODAMAGE OF SKIN  Photodistributed  Diffuse photodamage with actinic changes with telangiectasia and mottled pigmentation in sun-exposed areas.  Photodamage.  The signs and symptoms of skin cancer were reviewed and the patient was advised to practice sun protection and sun avoidance, use daily sunscreen, and perform regular self skin exams.  Sun protection was discussed, including avoiding the mid-day sun, wearing a sunscreen with SPF at least 50, and stressing the need for reapplication of sunscreen and applying more than they think they need.          [1]   Current Outpatient Medications:     acetaminophen (Tylenol) 500 mg tablet, Take 2 tablets (1,000 mg) by mouth every 6 hours if needed., Disp: , Rfl:     ascorbic acid, vitamin C, 500 mg capsule, Take 500 mg by mouth once daily., Disp: , Rfl:     atorvastatin (Lipitor) 40 mg tablet, Take 1 tablet (40 mg) by mouth once daily., Disp: 90 tablet, Rfl: 1    calcium carbonate-vitamin D3 500 mg-5 mcg (200 unit) tablet, Take 1 tablet by mouth once daily., Disp: , Rfl:     diclofenac sodium (Arthritis Pain, diclofenac,) 1 % gel, APPLY SPARINGLY TO AFFECTED AREAS ONCE DAILY, Disp:  100 g, Rfl: 0    escitalopram (Lexapro) 10 mg tablet, Take 1 tablet (10 mg) by mouth once daily., Disp: 90 tablet, Rfl: 3    letrozole (Femara) 2.5 mg tablet, Take 1 tablet (2.5 mg total) by mouth once daily.  Take with or without food., Disp: 90 tablet, Rfl: 2    lidocaine (Lidoderm) 5 % patch, PLACE 1 PATCH OVER 12 HOURS ON THE SKIN ONCE DAILY. REMOVE AND DISCARD PATCH WITHIN 12 HOURS OR AS DIRECTED BY MD, Disp: 90 patch, Rfl: 0    multivitamin (Multiple Vitamins) tablet, Take by mouth once daily., Disp: , Rfl:     ondansetron ODT (Zofran-ODT) 4 mg disintegrating tablet, Dissolve 1 tablet (4 mg) in the mouth every 6 hours if needed for nausea or vomiting., Disp: , Rfl:     tretinoin (Retin-A) 0.025 % cream, Apply nightly to bilateral shins, Disp: 60 g, Rfl: 3    triamcinolone (Kenalog) 0.1 % cream, Apply twice daily to affected areas of legs (avoid face, groin, body folds) for 2 weeks, then taper to twice daily on weekends only; repeat every 6-8 weeks as needed for flares, Disp: 80 g, Rfl: 1

## 2025-04-25 ENCOUNTER — APPOINTMENT (OUTPATIENT)
Dept: RADIATION ONCOLOGY | Facility: CLINIC | Age: 55
End: 2025-04-25
Payer: COMMERCIAL
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
## 2025-04-29 ENCOUNTER — HOSPITAL ENCOUNTER (OUTPATIENT)
Dept: RADIATION ONCOLOGY | Facility: CLINIC | Age: 55
Setting detail: RADIATION/ONCOLOGY SERIES
Discharge: HOME | End: 2025-04-29
Payer: COMMERCIAL

## 2025-04-29 ENCOUNTER — APPOINTMENT (OUTPATIENT)
Dept: ORTHOPEDIC SURGERY | Facility: HOSPITAL | Age: 55
End: 2025-04-29
Payer: COMMERCIAL

## 2025-04-29 VITALS
BODY MASS INDEX: 22.63 KG/M2 | WEIGHT: 140.21 LBS | RESPIRATION RATE: 18 BRPM | HEART RATE: 78 BPM | OXYGEN SATURATION: 98 % | SYSTOLIC BLOOD PRESSURE: 137 MMHG | DIASTOLIC BLOOD PRESSURE: 76 MMHG | TEMPERATURE: 96.8 F

## 2025-04-29 DIAGNOSIS — Z98.890 STATUS POST ARTHROSCOPY OF LEFT SHOULDER: Primary | ICD-10-CM

## 2025-04-29 DIAGNOSIS — Z17.0 MALIGNANT NEOPLASM OF UPPER-OUTER QUADRANT OF LEFT BREAST IN FEMALE, ESTROGEN RECEPTOR POSITIVE: Primary | ICD-10-CM

## 2025-04-29 DIAGNOSIS — C50.412 MALIGNANT NEOPLASM OF UPPER-OUTER QUADRANT OF LEFT BREAST IN FEMALE, ESTROGEN RECEPTOR POSITIVE: Primary | ICD-10-CM

## 2025-04-29 PROCEDURE — 99213 OFFICE O/P EST LOW 20 MIN: CPT | Performed by: NURSE PRACTITIONER

## 2025-04-29 PROCEDURE — 99212 OFFICE O/P EST SF 10 MIN: CPT | Performed by: ORTHOPAEDIC SURGERY

## 2025-04-29 ASSESSMENT — COLUMBIA-SUICIDE SEVERITY RATING SCALE - C-SSRS
2. HAVE YOU ACTUALLY HAD ANY THOUGHTS OF KILLING YOURSELF?: NO
1. IN THE PAST MONTH, HAVE YOU WISHED YOU WERE DEAD OR WISHED YOU COULD GO TO SLEEP AND NOT WAKE UP?: NO
6. HAVE YOU EVER DONE ANYTHING, STARTED TO DO ANYTHING, OR PREPARED TO DO ANYTHING TO END YOUR LIFE?: NO

## 2025-04-29 ASSESSMENT — PAIN SCALES - GENERAL: PAINLEVEL_OUTOF10: 0-NO PAIN

## 2025-04-29 ASSESSMENT — PATIENT HEALTH QUESTIONNAIRE - PHQ9
1. LITTLE INTEREST OR PLEASURE IN DOING THINGS: NOT AT ALL
2. FEELING DOWN, DEPRESSED OR HOPELESS: NOT AT ALL
SUM OF ALL RESPONSES TO PHQ9 QUESTIONS 1 AND 2: 0

## 2025-04-29 NOTE — PROGRESS NOTES
Radiation Oncology Follow-Up    Patient Name:  Ruba Woodard  MRN:  [id number]  :  1970    Referring Provider: No ref. provider found  Primary Care Provider: Gregg Gonzalez DO  Care Team: Patient Care Team:  Gregg Gonzalez DO as PCP - General (Family Medicine)  Gomez Olivera MD as Consulting Physician (Hematology and Oncology)    Date of Service: 2025          AJCC Edition: 8th (AJCC), Diagnosis Date: Dec 2022, IA, pT1b pN0 cM0 G1     Treatment Synopsis:    54 year female with left breast invasive carcinoma  IDC grade 2, ER 95%, KS 70% her2 2+ but negative by dual RE  left partial mastectomy with SLNB  (0/2) c/w grade I IDC, with DCIS grade2 , low risk mammaprint low risk luminal A +0.505, stage as [id number].     Medical oncology: Dr. Gomez Olivear  Radiation oncology: Dr. Gaye Eagle  Surgical oncology:  Dr. Nely Chavez     Treatment Summary:      On 10/13/2022: had a digital mammogram, screening that showed heterogenously dense breast tissue which may obscure small masses, left breat asymetry with right breast calcifications.  BIRADS category 0     On 10/28/2022: she had diagnostic mammogram that showed heterogenously dense breast, previously noted calcifications are grouped round, punctuate and indistinct calcifications in the inferolateral right breast  at anterior depth. within the left breast,  the previously noted asymmetry persists as an irregular focal asymmetry in the superolateral aspect of the posterior depth . On ultrasound at 1o'clock, 8 CFN  0.7x0.8x0.5cm mass irregular , left axilla US showed 3 unremarkable LN      On 2022: right breast calcification core needle biopsy  showed columnar cell change and hyperplasia with associated focal microcalcifications, left breast core needle biopsy at 1oclock, 10 CFN showed IDC grade 2, ER 95%, KS 70% her2 2+ but negative  by dual RE .with associated microcalcifcation with DICS, low nuclear grade, cribriform pattern with necrosis  and  calcifications. biopsy again of her right breast calcifications that was negative     On 12/14/2022 underwent left partial mastectomy with SLNB  (0/2) c/w grade I IDC, with DCIS grade2 , low risk mammaprint low risk luminal A +0.505, stage as [id number]     2/8/23 - 2/28/23: Radiation to the left breast consisting of a dose of 40.05 Gy given in 15 fractions of 2.67 Gy per fraction. ABC was utilized for cardiac sparing.      Adjuvant endocrine therapy with aromatase inhibitor     SUBJECTIVE  History of Present Illness:   Ruba Woodard is here today for routine radiation follow up/surveillance visit.  She is doing very well and reports left breast well healed post treatment. She had minimal skin changes and skin is intact. No swelling of left arm or difficulty with ROM.  She continues letrozole and says the only adverse effect is elevation in her lipids and hot flashes. Taking statin for cholesterol and takes Lexapro for hot flashes which has been effective.  She endorses some weight gain.  Bone density is normal.   Denies headaches, fever, chills, cough, SOB, chest  pain, GI or  complaints, or bony pain. She does have arthritis in her knees and shoulders. She is active and works as a pharmacist.      Review of Systems:    Review of Systems   All other systems reviewed and are negative.    Performance Status:   The Karnofsky performance scale today is 100, Fully active, able to carry on all pre-disease performed without restriction (ECOG equivalent 0).      OBJECTIVE    Current Outpatient Medications:     acetaminophen (Tylenol) 500 mg tablet, Take 2 tablets (1,000 mg) by mouth every 6 hours if needed., Disp: , Rfl:     ascorbic acid, vitamin C, 500 mg capsule, Take 500 mg by mouth once daily., Disp: , Rfl:     atorvastatin (Lipitor) 40 mg tablet, Take 1 tablet (40 mg) by mouth once daily., Disp: 90 tablet, Rfl: 1    calcium carbonate-vitamin D3 500 mg-5 mcg (200 unit) tablet, Take 1 tablet by mouth once daily., Disp:  , Rfl:     diclofenac sodium (Arthritis Pain, diclofenac,) 1 % gel, APPLY SPARINGLY TO AFFECTED AREAS ONCE DAILY, Disp: 100 g, Rfl: 0    escitalopram (Lexapro) 10 mg tablet, Take 1 tablet (10 mg) by mouth once daily., Disp: 90 tablet, Rfl: 3    letrozole (Femara) 2.5 mg tablet, Take 1 tablet (2.5 mg total) by mouth once daily.  Take with or without food., Disp: 90 tablet, Rfl: 2    lidocaine (Lidoderm) 5 % patch, PLACE 1 PATCH OVER 12 HOURS ON THE SKIN ONCE DAILY. REMOVE AND DISCARD PATCH WITHIN 12 HOURS OR AS DIRECTED BY MD, Disp: 90 patch, Rfl: 0    multivitamin (Multiple Vitamins) tablet, Take by mouth once daily., Disp: , Rfl:     ondansetron ODT (Zofran-ODT) 4 mg disintegrating tablet, Dissolve 1 tablet (4 mg) in the mouth every 6 hours if needed for nausea or vomiting., Disp: , Rfl:     tretinoin (Retin-A) 0.025 % cream, Apply nightly to bilateral shins, Disp: 60 g, Rfl: 3    triamcinolone (Kenalog) 0.1 % cream, Apply twice daily to affected areas of legs (avoid face, groin, body folds) for 2 weeks, then taper to twice daily on weekends only; repeat every 6-8 weeks as needed for flares, Disp: 80 g, Rfl: 1     Physical Exam  Constitutional:       Appearance: Normal appearance.   HENT:      Head: Normocephalic and atraumatic.      Nose: Nose normal.      Mouth/Throat:      Pharynx: Oropharynx is clear.   Eyes:      Extraocular Movements: Extraocular movements intact.      Pupils: Pupils are equal, round, and reactive to light.   Cardiovascular:      Pulses: Normal pulses.      Heart sounds: Normal heart sounds. No murmur heard.     No gallop.   Pulmonary:      Effort: Pulmonary effort is normal.      Breath sounds: Normal breath sounds.   Chest:   Breasts:     Right: Normal. No swelling, inverted nipple, mass or nipple discharge.      Left: Normal. No swelling, inverted nipple, mass or nipple discharge.      Comments: Left breast with well healed surgical incision.   Abdominal:      Palpations: Abdomen is soft.    Musculoskeletal:         General: No swelling. Normal range of motion.      Cervical back: Normal range of motion and neck supple.   Lymphadenopathy:      Cervical: No cervical adenopathy.   Skin:     General: Skin is warm and dry.   Neurological:      General: No focal deficit present.      Mental Status: She is alert and oriented to person, place, and time.   Psychiatric:         Mood and Affect: Mood normal.         Behavior: Behavior normal.         RESULTS:  Narrative & Impression   Interpreted By:  Brandi Torre,   STUDY:  BI MAMMO BILATERAL DIAGNOSTIC TOMOSYNTHESIS;  11/1/2024 8:21 am      ACCESSION NUMBER(S):  [id number]      ORDERING CLINICIAN:  ANABELLA TOSCANO      INDICATION:  Left lumpectomy with radiation. Benign right excisional biopsy.  Family history of breast cancer.      ,C50.412 Malignant neoplasm of upper-outer quadrant of left female  breast,Z17.0 Estrogen receptor positive status (ER+)      COMPARISON:  11/01/2023, monet 01/20/2022.      FINDINGS:  MAMMOGRAPHY: 2D and tomosynthesis images were reviewed at 1 mm slice  thickness.      Density:  The breasts are heterogeneously dense, which may obscure  small masses.      Postoperative scarring and surgical clips are again seen in the upper  outer left breast and low axilla. There is also postoperative  scarring in the upper outer right breast. No suspicious masses or  calcifications are identified.      IMPRESSION:  No mammographic evidence of malignancy. Posttreatment changes of the  left breast.      BI-RADS CATEGORY:  BI-RADS Category:  2 Benign.  Recommendation:  Annual Screening.  Recommended Date:  1 Year.  Laterality:  Bilateral.         ASSESSMENT/PLAN:  55 y.o. female with early stage left breast cancer s/p breast conserving surgery followed by radiation.  Doing well, and cosmesis is excellent.   She will continue letrozole and follow up routinely with  med onc and will schedule her mammogram in November 2025.  Radiation follow up in 12  mo. Call  us with any questions or concerns.     Nandini Weinstein CNP  [phone]

## 2025-05-02 ENCOUNTER — APPOINTMENT (OUTPATIENT)
Dept: ORTHOPEDIC SURGERY | Age: 55
End: 2025-05-02
Payer: COMMERCIAL

## 2025-05-08 DIAGNOSIS — G89.29 CHRONIC LEFT SHOULDER PAIN: ICD-10-CM

## 2025-05-08 DIAGNOSIS — M25.512 CHRONIC LEFT SHOULDER PAIN: ICD-10-CM

## 2025-05-08 RX ORDER — LIDOCAINE 50 MG/G
1 PATCH TOPICAL 2 TIMES DAILY
Qty: 60 PATCH | Refills: 2 | Status: SHIPPED | OUTPATIENT
Start: 2025-05-08 | End: 2025-06-07

## 2025-05-13 ENCOUNTER — APPOINTMENT (OUTPATIENT)
Dept: ORTHOPEDIC SURGERY | Facility: CLINIC | Age: 55
End: 2025-05-13
Payer: COMMERCIAL

## 2025-05-13 DIAGNOSIS — M17.0 PRIMARY OSTEOARTHRITIS OF BOTH KNEES: Primary | ICD-10-CM

## 2025-05-13 PROCEDURE — 99214 OFFICE O/P EST MOD 30 MIN: CPT | Performed by: STUDENT IN AN ORGANIZED HEALTH CARE EDUCATION/TRAINING PROGRAM

## 2025-05-13 PROCEDURE — 20610 DRAIN/INJ JOINT/BURSA W/O US: CPT | Performed by: STUDENT IN AN ORGANIZED HEALTH CARE EDUCATION/TRAINING PROGRAM

## 2025-05-13 RX ADMIN — TRIAMCINOLONE ACETONIDE 80 MG: 40 INJECTION, SUSPENSION INTRA-ARTICULAR; INTRAMUSCULAR at 10:40

## 2025-05-13 RX ADMIN — TRIAMCINOLONE ACETONIDE 80 MG: 40 INJECTION, SUSPENSION INTRA-ARTICULAR; INTRAMUSCULAR at 10:45

## 2025-05-13 RX ADMIN — ROPIVACAINE HYDROCHLORIDE 4 ML: 5 INJECTION, SOLUTION EPIDURAL; INFILTRATION; PERINEURAL at 10:45

## 2025-05-13 RX ADMIN — LIDOCAINE HYDROCHLORIDE 4 ML: 10 INJECTION, SOLUTION EPIDURAL; INFILTRATION; INTRACAUDAL; PERINEURAL at 10:45

## 2025-05-13 RX ADMIN — ROPIVACAINE HYDROCHLORIDE 4 ML: 5 INJECTION, SOLUTION EPIDURAL; INFILTRATION; PERINEURAL at 10:40

## 2025-05-13 RX ADMIN — LIDOCAINE HYDROCHLORIDE 4 ML: 10 INJECTION, SOLUTION EPIDURAL; INFILTRATION; INTRACAUDAL; PERINEURAL at 10:40

## 2025-05-13 ASSESSMENT — PAIN SCALES - GENERAL: PAINLEVEL_OUTOF10: 5 - MODERATE PAIN

## 2025-05-13 ASSESSMENT — PAIN - FUNCTIONAL ASSESSMENT: PAIN_FUNCTIONAL_ASSESSMENT: 0-10

## 2025-05-13 ASSESSMENT — PAIN DESCRIPTION - DESCRIPTORS: DESCRIPTORS: SHARP;THROBBING

## 2025-05-16 ENCOUNTER — APPOINTMENT (OUTPATIENT)
Dept: ORTHOPEDIC SURGERY | Facility: CLINIC | Age: 55
End: 2025-05-16
Payer: COMMERCIAL

## 2025-05-20 DIAGNOSIS — G89.29 CHRONIC PAIN OF BOTH SHOULDERS: ICD-10-CM

## 2025-05-20 DIAGNOSIS — M25.511 CHRONIC PAIN OF BOTH SHOULDERS: ICD-10-CM

## 2025-05-20 DIAGNOSIS — M25.512 CHRONIC PAIN OF BOTH SHOULDERS: ICD-10-CM

## 2025-05-20 RX ORDER — DICLOFENAC SODIUM 10 MG/G
GEL TOPICAL
Qty: 100 G | Refills: 0 | Status: SHIPPED | OUTPATIENT
Start: 2025-05-20

## 2025-05-25 NOTE — PROGRESS NOTES
PRIMARY CARE PHYSICIAN: Gregg Gonzalez DO  ORTHOPAEDIC FOLLOW-UP: Knee Evaluation    ASSESSMENT & PLAN    Impression: 55 y.o. female presenting for follow-up evaluation of chronic bilateral anterior knee pain.  At this time, we discussed continued nonoperative versus operative management options.  I believe when she is ready the patient would be a candidate for patellofemoral arthroplasty.  At this time, she would like to continue nonoperative management.  We discussed continuing oral anti-inflammatories and Tylenol for pain relief.  In addition, we have provided her bilateral corticosteroid injections today.  All questions were answered.  She will follow-up on an as-needed basis moving forward.    Plan:   I reviewed with the patient the nature of their diagnosis.  I reviewed their imaging studies with them.    Based on the history, physical exam and imaging studies above, the patient's presentation is consistent with consistent with the above diagnosis.  I had a long discussion with the patient regarding their presentation and the treatment options.  We discussed initial nonoperative versus operative management options as well as potential further diagnostic imaging.    Follow-Up: Patient will follow-up as needed moving forward.    At the end of the visit, all questions were answered in full. The patient is in agreement with the plan and recommendations. They will call the office with any questions/concerns.    Note dictated with [employer] software. Completed without full typed error editing and sent to avoid delay.     SUBJECTIVE  CHIEF COMPLAINT:   Chief Complaint   Patient presents with    Left Knee - Pain     B/L knee pain wants CSI    Right Knee - Pain     Last knee injections 2-7-25    Injections     CSI        HPI: Ruba Woodard is a 55 y.o. patient. Ruba Woodard complains of bilateral anterior knee pain.  The patient was most recently evaluated on 2/7/2025 at which time she was diagnosed  with bilateral patellofemoral joint osteoarthritis.  She has been trying home exercise program/physical therapy and over-the-counter anti-inflammatories, Tylenol and Voltaren gel as needed.  She continues to have pain that worsens after several months following these corticosteroid injections.  She denies any new injuries or falls.  She presents today for evaluation discussion of possible repeat corticosteroid injections.    REVIEW OF SYSTEMS  Constitutional: See HPI for pain assessment, No significant weight loss, recent trauma  Cardiovascular: No chest pain, shortness of breath  Respiratory: No difficulty breathing, cough  Gastrointestinal: No nausea, vomiting, diarrhea, constipation  Musculoskeletal: Noted in HPI, positive for pain, restricted motion, stiffness  Integumentary: No rashes, easy bruising, redness   Neurological: no numbness or tingling in extremities, no gait disturbances   Psychiatric: No mood changes, memory changes, social issues  Heme/Lymph: no excessive swelling, easy bruising, excessive bleeding  ENT: No hearing changes  Eyes: No vision changes    Medical History[1]     Allergies[2]     Surgical History[3]     Family History[4]     Social History     Socioeconomic History    Marital status:      Spouse name: Not on file    Number of children: Not on file    Years of education: Not on file    Highest education level: Not on file   Occupational History    Not on file   Tobacco Use    Smoking status: Never     Passive exposure: Never    Smokeless tobacco: Never   Vaping Use    Vaping status: Never Used   Substance and Sexual Activity    Alcohol use: Yes    Drug use: Never    Sexual activity: Yes     Partners: Male     Birth control/protection: Post-menopausal   Other Topics Concern    Not on file   Social History Narrative    Not on file     Social Drivers of Health     Financial Resource Strain: Not on file   Food Insecurity: Not on file   Transportation Needs: Not on file   Physical  "Activity: Not on file   Stress: Not on file   Social Connections: Not on file   Intimate Partner Violence: Not on file   Housing Stability: Not on file        CURRENT MEDICATIONS:   Current Medications[5]     OBJECTIVE    PHYSICAL EXAM      12/12/2024     1:10 PM 1/6/2025     1:08 PM 2/26/2025     8:33 AM 2/27/2025    11:01 AM 3/21/2025     8:18 AM 4/24/2025     8:29 AM 4/29/2025    10:50 AM   Vitals   Systolic 134 122 124   110 137   Diastolic 75 70 81   62 76   BP Location       Right arm   Heart Rate 76 62 78    78   Temp 36.6 °C (97.9 °F)  36.8 °C (98.2 °F)    36 °C (96.8 °F)   Resp 16  14    18   Height  1.676 m (5' 5.98\")  1.676 m (5' 6\") 1.676 m (5' 6\") 1.676 m (5' 6\")    Weight (lb)  142  136 137 140 140.21   BMI  22.93 kg/m2  21.95 kg/m2 22.11 kg/m2 22.6 kg/m2 22.63 kg/m2   BSA (m2)  1.73 m2  1.7 m2 1.7 m2 1.72 m2 1.72 m2   Visit Report  Report Report Report Report Report Report      There is no height or weight on file to calculate BMI.    GENERAL: A/Ox3, NAD. Appears healthy, well nourished  PSYCHIATRIC: Mood stable, appropriate memory recall  EYES: EOM intact, no scleral icterus  CARDIOVASCULAR: Palpable peripheral pulses  LUNGS: Breathing non-labored on room air  SKIN: no erythema, rashes, or ecchymoses     MUSCULOSKELETAL:  Laterality: Bilateral knee Exam  This is a well-appearing patient in no acute distress.  There is now effusion to the knee.  There is mild tenderness to palpation along the medial joint line, minimal tenderness to palpation along lateral joint line.  Range of motion: 0 to 120 degrees without pain.  There is no pain with manipulation of patella.  Negative Lachman's exam.  The knee is stable to posterior stress.  The knee is stable to varus and valgus stress at both 0 and 30 degrees knee flexion.  5/5 Strength TA, EHL, GS    NEUROVASCULAR:  - Neurovascular Status: sensation intact to light touch distally, lower extremity motor intact  - Capillary refill brisk at extremities, Bilateral " dorsalis pedis pulse 2+    Imaging: No new imaging obtained today.    L Inj/Asp: L knee on 5/13/2025 10:40 AM  Indications: pain  Details: 22 G needle, superolateral approach  Medications: 80 mg triamcinolone acetonide 40 mg/mL; 4 mL lidocaine PF 10 mg/mL (1 %); 4 mL ropivacaine 5 mg/mL (0.5 %)      L Inj/Asp: R knee on 5/13/2025 10:45 AM  Indications: pain  Details: 22 G needle, superolateral approach  Medications: 80 mg triamcinolone acetonide 40 mg/mL; 4 mL lidocaine PF 10 mg/mL (1 %); 4 mL ropivacaine 5 mg/mL (0.5 %)           Bunny Awan MD, MPH  Attending Surgeon    Sports Medicine Orthopaedic Surgery  [facility] [employer] [employer]          [1]   Past Medical History:  Diagnosis Date    Contact with and (suspected) exposure to potentially hazardous body fluids 12/19/2019    Accidental hypodermic needlestick injury with exposure to body fluid    Hyperlipidemia     Parageusia 07/16/2020    Loss of taste    Personal history of malignant neoplasm of breast 12/16/2022    History of malignant neoplasm of left breast    Personal history of other endocrine, nutritional and metabolic disease 10/07/2020    History of thyrotoxicosis   [2]   Allergies  Allergen Reactions    Hydrocodone Unknown    Scopolamine Other     Pain  stiffness    Morphine Other   [3]   Past Surgical History:  Procedure Laterality Date    ADENOIDECTOMY  11/12/2013    Adenoidectomy    ANKLE ARTHROSCOPY W/ OPEN REPAIR  11/12/2013    Ankle Repair    BI MAMMO GUIDED BREAST RIGHT LOCALIZATION Right 02/20/2020    BI MAMMO GUIDED LOCALIZATION BREAST RIGHT 2/20/2020 [facility]    BREAST BIOPSY Right 11/21/2022    Benign right core biopsy    BREAST LUMPECTOMY Left 12/14/2022    left Lumpectomy with Radiation 12/14/22    OTHER SURGICAL HISTORY  04/21/2021    Tonsillectomy    OTHER SURGICAL HISTORY  04/21/2021    Knee surgery    OTHER SURGICAL  HISTORY  04/21/2021    Bunionectomy    OTHER SURGICAL HISTORY  04/21/2021    Breast surgery    OTHER SURGICAL HISTORY  04/21/2021    Endometrial ablation    TONSILLECTOMY  11/12/2013    Tonsillectomy   [4]   Family History  Problem Relation Name Age of Onset    Breast cancer Mother      Multiple sclerosis Mother      Prostate cancer Paternal Grandfather      Colon cancer Other Grandfather     Other (Colon cancer) Other Spouse     Hyperlipidemia Other Family hx    [5]   Current Outpatient Medications   Medication Sig Dispense Refill    acetaminophen (Tylenol) 500 mg tablet Take 2 tablets (1,000 mg) by mouth every 6 hours if needed.      ascorbic acid, vitamin C, 500 mg capsule Take 500 mg by mouth once daily.      atorvastatin (Lipitor) 40 mg tablet Take 1 tablet (40 mg) by mouth once daily. 90 tablet 1    calcium carbonate-vitamin D3 500 mg-5 mcg (200 unit) tablet Take 1 tablet by mouth once daily.      diclofenac sodium (Arthritis Pain, diclofenac,) 1 % gel APPLY SPARINGLY TO AFFECTED AREAS ONCE DAILY 100 g 0    escitalopram (Lexapro) 10 mg tablet Take 1 tablet (10 mg) by mouth once daily. 90 tablet 3    letrozole (Femara) 2.5 mg tablet Take 1 tablet (2.5 mg total) by mouth once daily.  Take with or without food. 90 tablet 2    lidocaine (Lidoderm) 5 % patch Place 1 patch over 12 hours on the skin 2 times a day. Remove & discard patch within 12 hours or as directed by MD. 60 patch 2    multivitamin (Multiple Vitamins) tablet Take by mouth once daily.      ondansetron ODT (Zofran-ODT) 4 mg disintegrating tablet Dissolve 1 tablet (4 mg) in the mouth every 6 hours if needed for nausea or vomiting.      tretinoin (Retin-A) 0.025 % cream Apply nightly to bilateral shins 60 g 3    triamcinolone (Kenalog) 0.1 % cream Apply twice daily to affected areas of legs (avoid face, groin, body folds) for 2 weeks, then taper to twice daily on weekends only; repeat every 6-8 weeks as needed for flares 80 g 1     No current  facility-administered medications for this visit.

## 2025-05-27 RX ORDER — ROPIVACAINE HYDROCHLORIDE 5 MG/ML
4 INJECTION, SOLUTION EPIDURAL; INFILTRATION; PERINEURAL
Status: COMPLETED | OUTPATIENT
Start: 2025-05-13 | End: 2025-05-13

## 2025-05-27 RX ORDER — LIDOCAINE HYDROCHLORIDE 10 MG/ML
4 INJECTION, SOLUTION EPIDURAL; INFILTRATION; INTRACAUDAL; PERINEURAL
Status: COMPLETED | OUTPATIENT
Start: 2025-05-13 | End: 2025-05-13

## 2025-05-27 RX ORDER — TRIAMCINOLONE ACETONIDE 40 MG/ML
80 INJECTION, SUSPENSION INTRA-ARTICULAR; INTRAMUSCULAR
Status: COMPLETED | OUTPATIENT
Start: 2025-05-13 | End: 2025-05-13

## 2025-06-05 DIAGNOSIS — E78.2 MIXED HYPERLIPIDEMIA: ICD-10-CM

## 2025-06-06 RX ORDER — ATORVASTATIN CALCIUM 40 MG/1
40 TABLET, FILM COATED ORAL DAILY
Qty: 90 TABLET | Refills: 0 | OUTPATIENT
Start: 2025-06-06

## 2025-06-10 ENCOUNTER — APPOINTMENT (OUTPATIENT)
Dept: ORTHOPEDIC SURGERY | Facility: HOSPITAL | Age: 55
End: 2025-06-10
Payer: COMMERCIAL

## 2025-06-17 DIAGNOSIS — E78.2 MIXED HYPERLIPIDEMIA: ICD-10-CM

## 2025-06-19 ENCOUNTER — APPOINTMENT (OUTPATIENT)
Dept: ORTHOPEDIC SURGERY | Facility: HOSPITAL | Age: 55
End: 2025-06-19
Payer: COMMERCIAL

## 2025-06-24 ENCOUNTER — OFFICE VISIT (OUTPATIENT)
Dept: PRIMARY CARE | Facility: CLINIC | Age: 55
End: 2025-06-24
Payer: COMMERCIAL

## 2025-06-24 ENCOUNTER — APPOINTMENT (OUTPATIENT)
Dept: DERMATOLOGY | Facility: CLINIC | Age: 55
End: 2025-06-24
Payer: COMMERCIAL

## 2025-06-24 ENCOUNTER — HOSPITAL ENCOUNTER (OUTPATIENT)
Dept: RADIOLOGY | Facility: CLINIC | Age: 55
Discharge: HOME | End: 2025-06-24
Payer: COMMERCIAL

## 2025-06-24 VITALS
HEART RATE: 68 BPM | HEIGHT: 66 IN | SYSTOLIC BLOOD PRESSURE: 130 MMHG | BODY MASS INDEX: 22.34 KG/M2 | DIASTOLIC BLOOD PRESSURE: 80 MMHG | WEIGHT: 139 LBS

## 2025-06-24 DIAGNOSIS — T75.3XXS MOTION SICKNESS, SEQUELA: Primary | ICD-10-CM

## 2025-06-24 DIAGNOSIS — S99.922S: ICD-10-CM

## 2025-06-24 DIAGNOSIS — F43.10 PTSD (POST-TRAUMATIC STRESS DISORDER): ICD-10-CM

## 2025-06-24 DIAGNOSIS — E78.2 MIXED HYPERLIPIDEMIA: ICD-10-CM

## 2025-06-24 PROBLEM — T75.3XXA MOTION SICKNESS: Status: ACTIVE | Noted: 2025-06-24

## 2025-06-24 PROBLEM — F51.04 PSYCHOPHYSIOLOGICAL INSOMNIA: Status: RESOLVED | Noted: 2023-09-12 | Resolved: 2025-06-24

## 2025-06-24 PROBLEM — D22.5 MELANOCYTIC NEVI OF TRUNK: Status: RESOLVED | Noted: 2023-03-08 | Resolved: 2025-06-24

## 2025-06-24 PROBLEM — L30.1 DYSHIDROSIS (POMPHOLYX): Status: RESOLVED | Noted: 2023-03-08 | Resolved: 2025-06-24

## 2025-06-24 PROBLEM — L23.9 ALLERGIC CONTACT DERMATITIS, UNSPECIFIED CAUSE: Status: RESOLVED | Noted: 2023-03-08 | Resolved: 2025-06-24

## 2025-06-24 PROCEDURE — 73630 X-RAY EXAM OF FOOT: CPT | Mod: LT

## 2025-06-24 PROCEDURE — 73630 X-RAY EXAM OF FOOT: CPT | Mod: LEFT SIDE | Performed by: RADIOLOGY

## 2025-06-24 PROCEDURE — 3008F BODY MASS INDEX DOCD: CPT | Performed by: FAMILY MEDICINE

## 2025-06-24 PROCEDURE — 99214 OFFICE O/P EST MOD 30 MIN: CPT | Performed by: FAMILY MEDICINE

## 2025-06-24 PROCEDURE — 1036F TOBACCO NON-USER: CPT | Performed by: FAMILY MEDICINE

## 2025-06-24 RX ORDER — ATORVASTATIN CALCIUM 40 MG/1
40 TABLET, FILM COATED ORAL DAILY
Qty: 90 TABLET | Refills: 1 | Status: SHIPPED | OUTPATIENT
Start: 2025-06-24 | End: 2025-12-21

## 2025-06-24 RX ORDER — ESCITALOPRAM OXALATE 10 MG/1
10 TABLET ORAL DAILY
Qty: 90 TABLET | Refills: 1 | Status: SHIPPED | OUTPATIENT
Start: 2025-06-24 | End: 2025-12-21

## 2025-06-24 RX ORDER — ONDANSETRON 4 MG/1
4 TABLET, ORALLY DISINTEGRATING ORAL EVERY 6 HOURS PRN
Qty: 30 TABLET | Refills: 0 | Status: SHIPPED | OUTPATIENT
Start: 2025-06-24

## 2025-06-24 NOTE — ASSESSMENT & PLAN NOTE
Sent Zofran for as needed regimen while on boat  Reviewed previous EKG readings without any QT prolongation  Instructed to use sparingly and reviewed side effects of frequent zofran use
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
## 2025-06-24 NOTE — ASSESSMENT & PLAN NOTE
Ordered L foot xray particularly of 5th digit   Keep 7/22 ortho surg appointment   Appropriate foot pulses palpated, no increased swelling or erythema noted on L foot  Concerns of displaced fracture therefore will follow up once xray results return

## 2025-06-25 LAB
ALBUMIN SERPL-MCNC: 4.2 G/DL (ref 3.6–5.1)
ALP SERPL-CCNC: 68 U/L (ref 37–153)
ALT SERPL-CCNC: 25 U/L (ref 6–29)
ANION GAP SERPL CALCULATED.4IONS-SCNC: 9 MMOL/L (CALC) (ref 7–17)
AST SERPL-CCNC: 31 U/L (ref 10–35)
BILIRUB SERPL-MCNC: 0.5 MG/DL (ref 0.2–1.2)
BUN SERPL-MCNC: 17 MG/DL (ref 7–25)
CALCIUM SERPL-MCNC: 8.9 MG/DL (ref 8.6–10.4)
CHLORIDE SERPL-SCNC: 107 MMOL/L (ref 98–110)
CHOLEST SERPL-MCNC: 233 MG/DL
CHOLEST/HDLC SERPL: 2.7 (CALC)
CO2 SERPL-SCNC: 24 MMOL/L (ref 20–32)
CREAT SERPL-MCNC: 0.84 MG/DL (ref 0.5–1.03)
EGFRCR SERPLBLD CKD-EPI 2021: 81 ML/MIN/1.73M2
GLUCOSE SERPL-MCNC: 75 MG/DL (ref 65–99)
HDLC SERPL-MCNC: 86 MG/DL
LDLC SERPL CALC-MCNC: 122 MG/DL (CALC)
NONHDLC SERPL-MCNC: 147 MG/DL (CALC)
POTASSIUM SERPL-SCNC: 4.2 MMOL/L (ref 3.5–5.3)
PROT SERPL-MCNC: 6.4 G/DL (ref 6.1–8.1)
SODIUM SERPL-SCNC: 140 MMOL/L (ref 135–146)
TRIGL SERPL-MCNC: 136 MG/DL

## 2025-06-25 NOTE — RESULT ENCOUNTER NOTE
Your Xray returned with :Mildly comminuted but not significantly displaced fracture involving  the 5th proximal phalanx.  Please keep your ortho surg appointment 7/22 to follow up on treatment

## 2025-07-22 ENCOUNTER — OFFICE VISIT (OUTPATIENT)
Dept: ORTHOPEDIC SURGERY | Facility: HOSPITAL | Age: 55
End: 2025-07-22
Payer: COMMERCIAL

## 2025-07-22 DIAGNOSIS — M25.511 RIGHT SHOULDER PAIN, UNSPECIFIED CHRONICITY: Primary | ICD-10-CM

## 2025-07-22 DIAGNOSIS — Z98.890 STATUS POST ARTHROSCOPY OF LEFT SHOULDER: ICD-10-CM

## 2025-07-22 PROCEDURE — 99213 OFFICE O/P EST LOW 20 MIN: CPT | Performed by: NURSE PRACTITIONER

## 2025-07-22 PROCEDURE — 2500000004 HC RX 250 GENERAL PHARMACY W/ HCPCS (ALT 636 FOR OP/ED): Performed by: NURSE PRACTITIONER

## 2025-07-22 PROCEDURE — 20610 DRAIN/INJ JOINT/BURSA W/O US: CPT | Mod: RT | Performed by: NURSE PRACTITIONER

## 2025-07-22 PROCEDURE — 99212 OFFICE O/P EST SF 10 MIN: CPT | Mod: 25

## 2025-07-22 ASSESSMENT — PAIN SCALES - GENERAL: PAINLEVEL_OUTOF10: 4

## 2025-07-22 ASSESSMENT — PAIN - FUNCTIONAL ASSESSMENT: PAIN_FUNCTIONAL_ASSESSMENT: 0-10
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
## 2025-07-28 PROCEDURE — 20610 DRAIN/INJ JOINT/BURSA W/O US: CPT | Mod: RT | Performed by: NURSE PRACTITIONER

## 2025-07-28 RX ADMIN — TRIAMCINOLONE ACETONIDE 40 MG: 400 INJECTION, SUSPENSION INTRA-ARTICULAR; INTRAMUSCULAR at 23:01

## 2025-07-28 RX ADMIN — LIDOCAINE HYDROCHLORIDE 4 ML: 10 INJECTION, SOLUTION INFILTRATION; PERINEURAL at 23:01

## 2025-07-30 RX ORDER — TRIAMCINOLONE ACETONIDE 40 MG/ML
40 INJECTION, SUSPENSION INTRA-ARTICULAR; INTRAMUSCULAR
Status: COMPLETED | OUTPATIENT
Start: 2025-07-28 | End: 2025-07-28

## 2025-07-30 RX ORDER — LIDOCAINE HYDROCHLORIDE 10 MG/ML
4 INJECTION, SOLUTION INFILTRATION; PERINEURAL
Status: COMPLETED | OUTPATIENT
Start: 2025-07-28 | End: 2025-07-28

## 2025-08-27 ENCOUNTER — APPOINTMENT (OUTPATIENT)
Dept: ORTHOPEDIC SURGERY | Facility: CLINIC | Age: 55
End: 2025-08-27
Payer: COMMERCIAL

## 2025-08-28 ENCOUNTER — OFFICE VISIT (OUTPATIENT)
Dept: PRIMARY CARE | Facility: CLINIC | Age: 55
End: 2025-08-28
Payer: COMMERCIAL

## 2025-08-28 VITALS
HEART RATE: 75 BPM | HEIGHT: 66 IN | DIASTOLIC BLOOD PRESSURE: 74 MMHG | OXYGEN SATURATION: 99 % | BODY MASS INDEX: 22.66 KG/M2 | WEIGHT: 141 LBS | SYSTOLIC BLOOD PRESSURE: 130 MMHG

## 2025-08-28 DIAGNOSIS — S81.812A LACERATION OF LEFT LOWER EXTREMITY, INITIAL ENCOUNTER: ICD-10-CM

## 2025-08-28 DIAGNOSIS — H92.03 ACUTE EAR PAIN, BILATERAL: Primary | ICD-10-CM

## 2025-08-28 DIAGNOSIS — H65.01 NON-RECURRENT ACUTE SEROUS OTITIS MEDIA OF RIGHT EAR: ICD-10-CM

## 2025-08-28 DIAGNOSIS — R23.3 BRUISES EASILY: ICD-10-CM

## 2025-08-28 RX ORDER — LIDOCAINE 50 MG/G
1 PATCH TOPICAL DAILY
COMMUNITY
Start: 2025-08-11

## 2025-08-28 RX ORDER — FLUTICASONE PROPIONATE 50 MCG
2 SPRAY, SUSPENSION (ML) NASAL DAILY
Qty: 16 G | Refills: 5 | Status: SHIPPED | OUTPATIENT
Start: 2025-08-28 | End: 2026-08-28

## 2025-08-28 RX ORDER — AMOXICILLIN AND CLAVULANATE POTASSIUM 875; 125 MG/1; MG/1
875 TABLET, FILM COATED ORAL 2 TIMES DAILY
Qty: 20 TABLET | Refills: 0 | Status: SHIPPED | OUTPATIENT
Start: 2025-08-28 | End: 2025-09-07

## 2025-08-28 ASSESSMENT — ENCOUNTER SYMPTOMS
SLEEP DISTURBANCE: 0
LIGHT-HEADEDNESS: 1
ACTIVITY CHANGE: 0
SHORTNESS OF BREATH: 0
SORE THROAT: 0
WHEEZING: 0
CHILLS: 0
RHINORRHEA: 0
APPETITE CHANGE: 0
DIZZINESS: 0
FATIGUE: 0
HEADACHES: 0
SINUS PRESSURE: 0
TROUBLE SWALLOWING: 0
ARTHRALGIAS: 0
FEVER: 0

## 2025-09-10 ENCOUNTER — APPOINTMENT (OUTPATIENT)
Dept: OTOLARYNGOLOGY | Facility: CLINIC | Age: 55
End: 2025-09-10
Payer: COMMERCIAL

## 2025-09-30 ENCOUNTER — APPOINTMENT (OUTPATIENT)
Dept: OTOLARYNGOLOGY | Facility: CLINIC | Age: 55
End: 2025-09-30
Payer: COMMERCIAL

## 2025-10-22 ENCOUNTER — APPOINTMENT (OUTPATIENT)
Dept: DERMATOLOGY | Facility: CLINIC | Age: 55
End: 2025-10-22
Payer: COMMERCIAL

## 2025-10-23 ENCOUNTER — APPOINTMENT (OUTPATIENT)
Dept: DERMATOLOGY | Facility: CLINIC | Age: 55
End: 2025-10-23
Payer: COMMERCIAL

## 2026-04-23 ENCOUNTER — APPOINTMENT (OUTPATIENT)
Dept: OBSTETRICS AND GYNECOLOGY | Facility: CLINIC | Age: 56
End: 2026-04-23
Payer: COMMERCIAL

## (undated) DEVICE — Device

## (undated) DEVICE — ELECTRODE, ELECTROSURGICAL, BLADE, INSULATED, ENT/IMA, STERILE

## (undated) DEVICE — TUBING, PATIENT 8FT STERILE

## (undated) DEVICE — GOWN, ASTOUND, XL

## (undated) DEVICE — SUTURE, PDS II, 1, 54 IN, XLH, VIOLET

## (undated) DEVICE — SPONGE, GAUZE, AVANT, STERILE, NONWOVEN, 4PLY, 4 X 4, STANDARD

## (undated) DEVICE — DRESSING, TRANSPARENT, TEGADERM, FRAME STYLE, 4 X 4.5, STRL

## (undated) DEVICE — STRIP, SKIN CLOSURE, STERI STRIP, REINFORCED, 0.5 X 4 IN

## (undated) DEVICE — DRESSING, GAUZE, PETROLATUM, PATCH, XEROFORM, 1 X 8 IN, STERILE

## (undated) DEVICE — DRESSING, ABDOMINAL, TENDERSORB, 8 X 7-1/2 IN, STERILE

## (undated) DEVICE — KIT, BEACH CHAIR TRIMANO

## (undated) DEVICE — TIP, SUCTION, YANKAUER, BULB, ADULT

## (undated) DEVICE — SUTURE, ETHILON, 3-0, 18 IN, PS1, BLACK

## (undated) DEVICE — SUTURE, MONOCRYL PLUS, 3-0, PS-2 UD 18IN

## (undated) DEVICE — MASK, FACE, TENET, FOAM POSITIONING, DISPOSABLE

## (undated) DEVICE — WAND, COBLATION, WEREWOLF FLOW 90

## (undated) DEVICE — SUTURE, VICRYL PLUS, 2-0, SH, 1X27IN, UNDYED

## (undated) DEVICE — DRAPE, SHEET, U, STERI DRAPE, 47 X 51 IN, DISPOSABLE, STERILE

## (undated) DEVICE — DRAPE, SHEET, 17 X 23 IN

## (undated) DEVICE — GLOVE, SURGICAL, PROTEXIS PI , 8.5, PF, LF

## (undated) DEVICE — EXCAL 4MM X 13CM SINGLE

## (undated) DEVICE — BLANKET, LOWER BODY, VHA PLUS, ADULT

## (undated) DEVICE — TUBING, DUAL WAVE, OUTFLOW

## (undated) DEVICE — GLOVE, SURGICAL, PROTEXIS PI BLUE W/NEUTHERA, 6.5, PF, LF

## (undated) DEVICE — DRESSING, GAUZE, SPONGE, VERSALON, 4 PLY, 2 X 2 IN, STERILE

## (undated) DEVICE — CANNULA, FLEXIBLE 6.5 X 72 THREAD CLEAR TRAC